# Patient Record
Sex: FEMALE | Race: WHITE
[De-identification: names, ages, dates, MRNs, and addresses within clinical notes are randomized per-mention and may not be internally consistent; named-entity substitution may affect disease eponyms.]

---

## 2017-09-05 ENCOUNTER — HOSPITAL ENCOUNTER (EMERGENCY)
Dept: HOSPITAL 10 - E/R | Age: 67
Discharge: LEFT BEFORE DISCHARGE | End: 2017-09-05
Payer: MEDICARE

## 2017-09-05 VITALS
BODY MASS INDEX: 30.08 KG/M2 | WEIGHT: 169.76 LBS | BODY MASS INDEX: 30.08 KG/M2 | HEIGHT: 63 IN | WEIGHT: 169.76 LBS | HEIGHT: 63 IN

## 2017-09-05 DIAGNOSIS — Z53.21: Primary | ICD-10-CM

## 2017-09-06 ENCOUNTER — HOSPITAL ENCOUNTER (OUTPATIENT)
Dept: HOSPITAL 10 - E/R | Age: 67
Setting detail: OBSERVATION
LOS: 2 days | Discharge: HOME | End: 2017-09-08
Attending: INTERNAL MEDICINE | Admitting: INTERNAL MEDICINE
Payer: MEDICARE

## 2017-09-06 VITALS
BODY MASS INDEX: 30.66 KG/M2 | WEIGHT: 173.06 LBS | HEIGHT: 63 IN | HEIGHT: 63 IN | WEIGHT: 173.06 LBS | BODY MASS INDEX: 30.66 KG/M2

## 2017-09-06 VITALS — DIASTOLIC BLOOD PRESSURE: 71 MMHG | SYSTOLIC BLOOD PRESSURE: 105 MMHG | HEART RATE: 73 BPM | RESPIRATION RATE: 20 BRPM

## 2017-09-06 VITALS — SYSTOLIC BLOOD PRESSURE: 110 MMHG | RESPIRATION RATE: 19 BRPM | DIASTOLIC BLOOD PRESSURE: 49 MMHG

## 2017-09-06 VITALS — HEART RATE: 80 BPM

## 2017-09-06 VITALS — HEART RATE: 85 BPM

## 2017-09-06 VITALS — TEMPERATURE: 98 F

## 2017-09-06 DIAGNOSIS — K74.60: ICD-10-CM

## 2017-09-06 DIAGNOSIS — R07.89: Primary | ICD-10-CM

## 2017-09-06 DIAGNOSIS — Z88.5: ICD-10-CM

## 2017-09-06 DIAGNOSIS — Z88.6: ICD-10-CM

## 2017-09-06 DIAGNOSIS — I10: ICD-10-CM

## 2017-09-06 DIAGNOSIS — I96: ICD-10-CM

## 2017-09-06 DIAGNOSIS — D61.818: ICD-10-CM

## 2017-09-06 DIAGNOSIS — E11.9: ICD-10-CM

## 2017-09-06 DIAGNOSIS — Z79.4: ICD-10-CM

## 2017-09-06 DIAGNOSIS — F32.9: ICD-10-CM

## 2017-09-06 LAB
ABNORMAL IP MESSAGE: 1
ALBUMIN SERPL-MCNC: 3.4 G/DL (ref 3.3–4.9)
ALBUMIN/GLOB SERPL: 1 {RATIO}
ALP SERPL-CCNC: 173 IU/L (ref 42–121)
ALT SERPL-CCNC: 37 IU/L (ref 13–69)
ANION GAP SERPL CALC-SCNC: 10 MMOL/L (ref 8–16)
APTT BLD: 28.9 SEC (ref 25–35)
AST SERPL-CCNC: 36 IU/L (ref 15–46)
BASOPHILS # BLD AUTO: 0 10^3/UL (ref 0–0.1)
BASOPHILS NFR BLD: 0.6 % (ref 0–2)
BILIRUB DIRECT SERPL-MCNC: 0 MG/DL (ref 0–0.2)
BILIRUB SERPL-MCNC: 1 MG/DL (ref 0.2–1.3)
BUN SERPL-MCNC: 14 MG/DL (ref 7–20)
CALCIUM SERPL-MCNC: 9.6 MG/DL (ref 8.4–10.2)
CHLORIDE SERPL-SCNC: 108 MMOL/L (ref 97–110)
CK MB SERPL-MCNC: 0.43 NG/ML (ref 0–2.4)
CK SERPL-CCNC: 28 IU/L (ref 23–200)
CO2 SERPL-SCNC: 27 MMOL/L (ref 21–31)
CREAT SERPL-MCNC: 0.61 MG/DL (ref 0.44–1)
EOSINOPHIL # BLD: 0 10^3/UL (ref 0–0.5)
EOSINOPHIL NFR BLD: 0.9 % (ref 0–7)
ERYTHROCYTE [DISTWIDTH] IN BLOOD BY AUTOMATED COUNT: 16.5 % (ref 11.5–14.5)
GLOBULIN SER-MCNC: 3.4 G/DL (ref 1.3–3.2)
GLUCOSE SERPL-MCNC: 282 MG/DL (ref 70–220)
HCT VFR BLD CALC: 28.7 % (ref 37–47)
HGB BLD-MCNC: 9.2 G/DL (ref 12–16)
INR PPP: 1.29
LYMPHOCYTES # BLD AUTO: 0.6 10^3/UL (ref 0.8–2.9)
LYMPHOCYTES NFR BLD AUTO: 17.5 % (ref 15–51)
MCH RBC QN AUTO: 25.4 PG (ref 29–33)
MCHC RBC AUTO-ENTMCNC: 32.1 G/DL (ref 32–37)
MCV RBC AUTO: 79.3 FL (ref 82–101)
MONOCYTES # BLD: 0.3 10^3/UL (ref 0.3–0.9)
MONOCYTES NFR BLD: 9.3 % (ref 0–11)
NEUTROPHILS NFR BLD AUTO: 71.7 % (ref 39–77)
NRBC # BLD MANUAL: 0 10^3/UL (ref 0–0)
NRBC BLD AUTO-RTO: 0 /100WBC (ref 0–0)
PLATELET # BLD: 58 10^3/UL (ref 140–415)
PMV BLD AUTO: 10.8 FL (ref 7.4–10.4)
POSITIVE DIFF: (no result)
POTASSIUM SERPL-SCNC: 4.6 MMOL/L (ref 3.5–5.1)
PROT SERPL-MCNC: 6.8 G/DL (ref 6.1–8.1)
PROTHROMBIN TIME: 16.2 SEC (ref 12.2–14.2)
PT RATIO: 1.3
RBC # BLD AUTO: 3.62 10^6/UL (ref 4.2–5.4)
SODIUM SERPL-SCNC: 140 MMOL/L (ref 135–144)
TROPONIN I SERPL-MCNC: < 0.012 NG/ML (ref 0–0.12)
TROPONIN I SERPL-MCNC: < 0.012 NG/ML (ref 0–0.12)
WBC # BLD AUTO: 3.4 10^3/UL (ref 4.8–10.8)

## 2017-09-06 PROCEDURE — 96372 THER/PROPH/DIAG INJ SC/IM: CPT

## 2017-09-06 PROCEDURE — 82550 ASSAY OF CK (CPK): CPT

## 2017-09-06 PROCEDURE — 71010: CPT

## 2017-09-06 PROCEDURE — 84484 ASSAY OF TROPONIN QUANT: CPT

## 2017-09-06 PROCEDURE — 93005 ELECTROCARDIOGRAM TRACING: CPT

## 2017-09-06 PROCEDURE — 36415 COLL VENOUS BLD VENIPUNCTURE: CPT

## 2017-09-06 PROCEDURE — 99285 EMERGENCY DEPT VISIT HI MDM: CPT

## 2017-09-06 PROCEDURE — 84439 ASSAY OF FREE THYROXINE: CPT

## 2017-09-06 PROCEDURE — 82553 CREATINE MB FRACTION: CPT

## 2017-09-06 PROCEDURE — 85025 COMPLETE CBC W/AUTO DIFF WBC: CPT

## 2017-09-06 PROCEDURE — 82728 ASSAY OF FERRITIN: CPT

## 2017-09-06 PROCEDURE — 82652 VIT D 1 25-DIHYDROXY: CPT

## 2017-09-06 PROCEDURE — 84443 ASSAY THYROID STIM HORMONE: CPT

## 2017-09-06 PROCEDURE — 85610 PROTHROMBIN TIME: CPT

## 2017-09-06 PROCEDURE — 82962 GLUCOSE BLOOD TEST: CPT

## 2017-09-06 PROCEDURE — 83735 ASSAY OF MAGNESIUM: CPT

## 2017-09-06 PROCEDURE — 85730 THROMBOPLASTIN TIME PARTIAL: CPT

## 2017-09-06 PROCEDURE — 80053 COMPREHEN METABOLIC PANEL: CPT

## 2017-09-06 PROCEDURE — 83036 HEMOGLOBIN GLYCOSYLATED A1C: CPT

## 2017-09-06 PROCEDURE — 80061 LIPID PANEL: CPT

## 2017-09-06 PROCEDURE — 83540 ASSAY OF IRON: CPT

## 2017-09-06 PROCEDURE — 82140 ASSAY OF AMMONIA: CPT

## 2017-09-06 PROCEDURE — 93306 TTE W/DOPPLER COMPLETE: CPT

## 2017-09-06 PROCEDURE — 84100 ASSAY OF PHOSPHORUS: CPT

## 2017-09-06 RX ADMIN — RIFAXIMIN SCH MG: 550 TABLET ORAL at 21:17

## 2017-09-06 RX ADMIN — CALCIUM SCH GM: 500 TABLET ORAL at 21:16

## 2017-09-06 RX ADMIN — PANTOPRAZOLE SODIUM SCH MG: 40 TABLET, DELAYED RELEASE ORAL at 19:28

## 2017-09-06 RX ADMIN — LACTULOSE SCH GM: 10 SOLUTION ORAL at 21:15

## 2017-09-06 RX ADMIN — GABAPENTIN SCH MG: 100 CAPSULE ORAL at 21:33

## 2017-09-06 NOTE — RADRPT
PROCEDURE:   Chest xray.

 

CLINICAL INDICATION:   Chest pain, difficulty breathing.

 

TECHNIQUE:   A portable semiupright AP view of the chest was obtained.

 

COMPARISON:   11/12/2016.

 

FINDINGS:

There is stable mild enlargement of the cardiac silhouette. Atherosclerotic calcifications are again
 noted in the aorta. The lungs are well moderately expanded and show normal vascularity. No focal op
acity, pleural effusion, or pneumothorax is identified. The skeletal structures and soft tissues are
 unremarkable.

 

IMPRESSION:

Stable mild cardiomegaly. Otherwise, no acute intrathoracic abnormality.

 

RPTAT:PP

_____________________________________________ 

.Bobbi Raman MD, MD           Date    Time 

Electronically viewed and signed by .Bobbi Raman MD, MD on 09/06/2017 12:50 

 

D:  09/06/2017 12:50  T:  09/06/2017 12:50

.K/

## 2017-09-06 NOTE — CONS
Date/Time of Note


Date/Time of Note


DATE: 9/6/17 


TIME: 22:24





Assessment/Plan


Assessment/Plan


Additional Assessment/Plan


Chest and abdominal pain


Dyspnea


Liver cirrhosis


Pancytopenia


History of GI bleed





-Patient with symptoms of chest discomfort and abdominal pain which is 

exacerbated by palpation and movements.  She does as well complaint of dyspnea 

which is exacerbated by activity.  Initial ECG with no significant ischemic 

abnormalities, first 2 sets of cardiac enzymes remain negative.  Will obtain 

serial cardiac enzymes, check echocardiogram.  Given history of GI bleed and 

current pancytopenia, would not continue aspirin at the current time.  Would 

consider concurrently evaluating possible GI etiology to patient's abdominal 

discomfort and chest discomfort.





Consultation Date/Type/Reason


Admit Date/Time





Type of Consultation:  cv


Reason for Consultation


Chest pain





Hx of Present Illness


This is a 67-year-old female with past medical history of liver cirrhosis, 

pancytopenia, GI bleed who presents with complaints of abdominal pain, chest 

pain and shortness of breath.  Chest pain is worse with palpation of chest wall 

and abdomen.  Shortness of breath is exacerbated by activity.  Symptoms began 

approximately 2-3 days ago.  No fevers or chills, dizziness or lightheadedness.

  At the current time, she denies chest pain or shortness of breath.


12 point review of systems was performed with all pertinent positives and 

negatives mentioned above and all else is negative


Eyes:  no complaints


ENT:  no complaints


Respiratory:  shortness of breath


Cardiovascular:  chest pain, lightheadedness


Gastrointestinal:  no complaints


Genitourinary:  no complaints


Musculoskeletal:  other (B/L LE pain)


Skin:  laceration (Right great toe)


Neurologic:  no complaints


Lymphatic:  no complaints


Psychological:  no complaints


Immunologic:  no complaints





Past Medical History


Medical History:  diabetes, high cholesterol, other (Non-alcoholic liver 

cirrhosis, esophageal varices)





Past Surgical History


Past Surgical Hx:  other (TIPS)





Family History


Significant Family History:  no pertinent family hx





Social History


Alcohol Use:  none


Smoking Status:  Never smoker


Drug Use:  none





Exam/Review of Systems


Vital Signs


Vitals





 Vital Signs








  Date Time  Temp Pulse Resp B/P Pulse Ox O2 Delivery O2 Flow Rate FiO2


 


9/6/17 20:34 97.9 79 19 110/49 98   


 


9/6/17 18:51      Room Air  











Exam


No apparent distress


Constitutional:  alert, oriented


Head:  normocephalic


Respiratory:  clear to auscultation, normal air movement


Cardiovascular:  other (S1-S2 heard), regular rate and rhythm


Gastrointestinal:  bowel sounds, soft, tender (More so epigastric region and 

left upper quadrant)


Musculoskeletal:  other (Discomfort with palpation of chest wall)


Extremities:  edema





Results


Result Diagram:  


9/6/17 1220                                                                    

            9/6/17 1220





Results 24 hrs





Laboratory Tests








Test


  9/6/17


12:20 9/6/17


18:20 9/6/17


19:24 9/6/17


21:21


 


White Blood Count 3.4  L   


 


Red Blood Count 3.62  L   


 


Hemoglobin 9.2  L   


 


Hematocrit 28.7  L   


 


Mean Corpuscular Volume 79.3  L   


 


Mean Corpuscular Hemoglobin 25.4  L   


 


Mean Corpuscular Hemoglobin


Concent 32.1  


  


  


  


 


 


Red Cell Distribution Width 16.5  H   


 


Platelet Count 58  L   


 


Mean Platelet Volume 10.8  #H   


 


Neutrophils % 71.7     


 


Lymphocytes % 17.5     


 


Monocytes % 9.3     


 


Eosinophils % 0.9     


 


Basophils % 0.6     


 


Nucleated Red Blood Cells % 0.0     


 


Neutrophils # (Manual) 2.5     


 


Lymphocytes # 0.6  L   


 


Monocytes # 0.3     


 


Eosinophils # 0.0     


 


Basophils # 0.0     


 


Nucleated Red Blood Cells # 0.0     


 


Prothrombin Time 16.2  H   


 


Prothrombin Time Ratio 1.3     


 


INR International Normalized


Ratio 1.29  


  


  


  


 


 


Activated Partial


Thromboplast Time 28.9  


  


  


  


 


 


Sodium Level 140     


 


Potassium Level 4.6     


 


Chloride Level 108     


 


Carbon Dioxide Level 27     


 


Anion Gap 10     


 


Blood Urea Nitrogen 14     


 


Creatinine 0.61     


 


Glucose Level 282  H   


 


Hemoglobin A1c 8.9  H   


 


Calcium Level 9.6     


 


Total Bilirubin 1.0     


 


Direct Bilirubin 0.00     


 


Indirect Bilirubin 1.0     


 


Aspartate Amino Transf


(AST/SGOT) 36  


  


  


  


 


 


Alanine Aminotransferase


(ALT/SGPT) 37  


  


  


  


 


 


Alkaline Phosphatase 173  H   


 


Troponin I < 0.012   < 0.012    


 


Total Protein 6.8     


 


Albumin 3.4     


 


Globulin 3.40  H   


 


Albumin/Globulin Ratio 1.00     


 


Thyroid Stimulating Hormone


(TSH) 1.270  


  


  


  


 


 


Free Thyroxine 1.24     


 


Creatine Kinase  28    


 


Creatine Kinase Index  1.5    


 


Creatinine Kinase MB (Mass)  0.43    


 


Bedside Glucose   340  H 389  H











Medications


Medications





 Current Medications


Ondansetron HCl (Zofran Inj) 4 mg Q6H  PRN IV NAUSEA AND/OR VOMITING;  Start 9/6 /17 at 16:30


Acetaminophen (Tylenol Tab) 650 mg Q6H  PRN PO PAIN LEVEL 1-3 OR FEVER;  Start 9 /6/17 at 16:30


Calcium Carbonate (Oyster Shell Calcium) 1.25 gm BID PO  Last administered on 9/ 6/17at 21:16; Admin Dose 1.25 GM;  Start 9/6/17 at 21:00


Furosemide (Lasix) 20 mg DAILY PO ;  Start 9/7/17 at 09:00


Gabapentin (Neurontin) 100 mg TID PO  Last administered on 9/6/17at 21:33; 

Admin Dose 100 MG;  Start 9/6/17 at 21:00


Isosorbide Mononitrate (Ismo) 20 mg BID PO  Last administered on 9/6/17at 21:18

; Admin Dose 20 MG;  Start 9/6/17 at 21:00


Lactulose (Enulose) 10 gm BID PO  Last administered on 9/6/17at 21:15; Admin 

Dose 10 GM;  Start 9/6/17 at 21:00


Meclizine HCl (Antivert) 25 mg DAILY PO ;  Start 9/7/17 at 09:00


Pantoprazole (Protonix Tab) 40 mg DAILY PO  Last administered on 9/6/17at 19:28

; Admin Dose 40 MG;  Start 9/6/17 at 17:00


Rifaximin (Xifaxan) 550 mg BID PO  Last administered on 9/6/17at 21:17; Admin 

Dose 550 MG;  Start 9/6/17 at 21:00


Diagnostic Test (Pha) (Accu-Chek) 1 ea 02 XX ;  Start 9/7/17 at 02:00


Insulin Glargine (Lantus) 20 unit DAILY@08 SC ;  Start 9/7/17 at 08:00


Miscellaneous Information 1 ea NOTE XX ;  Start 9/6/17 at 17:00


Glucose (Glutose) 15 gm Q15M  PRN PO DECREASED GLUCOSE;  Start 9/6/17 at 17:00


Glucose (Glutose) 22.5 gm Q15M  PRN PO DECREASED GLUCOSE;  Start 9/6/17 at 17:00


Dextrose (D50w Syringe) 25 ml Q15M  PRN IV DECREASED GLUCOSE;  Start 9/6/17 at 

17:00


Dextrose (D50w Syringe) 50 ml Q15M  PRN IV DECREASED GLUCOSE;  Start 9/6/17 at 

17:00


Glucagon (Glucagen) 1 mg Q15M  PRN IM DECREASED GLUCOSE;  Start 9/6/17 at 17:00


Glucose (Glutose) 15 gm Q15M  PRN BUCCAL DECREASED GLUCOSE;  Start 9/6/17 at 17:

00





Procedures


Procedures


ECG with sinus rhythm, normal QRS duration, no significant ischemic ST 

abnormalities











Barrington Siddiqi DO Sep 6, 2017 22:30

## 2017-09-06 NOTE — ERA
ER Documentation


Chief Complaint


Date/Time


DATE: 9/6/17 


TIME: 12:20


Chief Complaint


chest pain with sob intermittent for 2 days sob worse when laying down





HPI


This is a 67-year-old female who is using her daughter as an  who 

presents the emergency room with chest pain.  She has a history of hepatitis, 

cirrhosis and diabetes.  She describes at least 2 days of chest pain that she 

describes as left-sided, pressure-like and exertional with associated shortness 

of breath.  Chest pain alleviated with cessation of walking or exerting 

symptoms.  She denies any pleuritic pain.  She does note some increased social 

stressors recently.  She denies any melena, hematemesis.  No abdominal pain.





ROS


All systems reviewed and are negative except as per history of present illness.





Medications


Home Meds


Active Scripts


Pantoprazole* (Protonix*) 40 Mg Tablet.dr, 40 MG PO DAILY, #14 TAB


   Prov:RORY PETERSEN MD         11/12/16


Reported Medications


Calcium Carbonate ([id number]) 500 Mg Tablet, 500 MG PO BID, TAB


   9/6/17


Insulin Detemir (Levemir) 100 Unit/1 Ml Vial, 60 UNIT SC QHS, VIAL


   9/6/17


Insulin Aspart* (Novolog Insulin Pen*) 100 Unit/Ml Soln, 20 UNIT SC QAM, EA


   9/6/17


Isosorbide Mononitrate* (Isosorbide Mononitrate*) 20 Mg Tablet, 20 MG PO BID, 

TAB


   11/12/16


Ibuprofen* (Ibuprofen*) 600 Mg Tablet, 600 MG PO DAILY Y for PAIN, TAB


   11/12/16


Lactulose* (Lactulose*) 10 Gm/15 Ml Solution, 10 GM PO BID, ML


   11/12/16


Gabapentin* (Gabapentin*) 100 Mg Capsule, 100 MG PO TID, CAP


   5/10/15


Rifaximin* (Xifaxan*) 550 Mg Tablet, 550 MG PO BID


   5/8/14


Meclizine Hcl* (Meclizine Hcl*) 25 Mg Tablet, 25 MG PO DAILY


   6/27/13


Furosemide* (Lasix*) 20 Mg Tablet, 20 MG PO DAILY


   6/27/13


Discontinued Reported Medications


Cyanocobalamin* (Vitamin B-12*) 1,000 Mcg Tablet.sa, 1000 MCG PO DAILY, TAB


   11/12/16


Carbidopa/Levodopa (CARBIDOPA-LEVO  MG ODT) 1 Each Tab.rapdis, 1 TAB PO 

BID, #60 TAB


   11/12/16


Dexlansoprazole (Dexilant) 60 Mg Cap.dr.mp, 60 MG PO DAILY, #30 CAP


   11/12/16


Spironolactone* (Aldactone*) 50 Mg Tablet, 50 MG PO TID


   6/27/13





Allergies


Allergies:  


Coded Allergies:  


     morphine (Verified  Allergy, Mild, 9/6/17)


 FACIAL REDNESS


     aspirin (Verified  Allergy, Unknown, 9/6/17)





PMhx/Soc


History of Surgery:  Yes (hepatic banding/ tips)


Anesthesia Reaction:  No


Hx Neurological Disorder:  No


Hx Respiratory Disorders:  No


Hx Cardiac Disorders:  Yes (HTN)


Hx Psychiatric Problems:  Yes (depression- lost son 10 yo; lost husb 2 wks ago)


Hx Miscellaneous Medical Probl:  No


Hx Alcohol Use:  No


Hx Substance Use:  No


Hx Tobacco Use:  No





FmHx


Family History:  diabetes





Physical Exam


Vitals





Vital Signs








  Date Time  Temp Pulse Resp B/P Pulse Ox O2 Delivery O2 Flow Rate FiO2


 


9/6/17 14:51 98.0 73 18 101/49 99 Room Air  


 


9/6/17 13:19 97.7 75 16 115/49 98 Room Air  


 


9/6/17 11:42 97.6 81 18 124/57 98   








Physical Exam


General: Well developed, well nourished, no acute distress


Head: Normocephalic, atraumatic.


Eyes: Pupils equally reactive, EOM intact


ENT: Moist mucous membranes


Neck: Supple, no lymphadenopathy


Respiratory: Lungs clear bilaterally, no distress


Cardiovascular: RRR, no murmurs, rubs, or gallops


Abdominal: Soft, non-tender, non-distended, no peritoneal signs


: Deferred


MSK: No edema, no unilateral swelling, 5/5 strength, No pulse deficits


Neurologic: Alert and oriented, moving all extremities, normal speech, no focal 

weakness, no cerebellar signs


Skin: No rash


Psych: Normal mood


Result Diagram:  


9/6/17 1220                                                                    

            9/6/17 1220





Results 24 hrs





 Laboratory Tests








Test


  9/6/17


12:20


 


White Blood Count 3.410^3/ul 


 


Red Blood Count 3.6210^6/ul 


 


Hemoglobin 9.2g/dl 


 


Hematocrit 28.7% 


 


Mean Corpuscular Volume 79.3fl 


 


Mean Corpuscular Hemoglobin 25.4pg 


 


Mean Corpuscular Hemoglobin


Concent 32.1g/dl 


 


 


Red Cell Distribution Width 16.5% 


 


Platelet Count 5810^3/UL 


 


Mean Platelet Volume 10.8fl 


 


Neutrophils % 71.7% 


 


Lymphocytes % 17.5% 


 


Monocytes % 9.3% 


 


Eosinophils % 0.9% 


 


Basophils % 0.6% 


 


Nucleated Red Blood Cells % 0.0/100WBC 


 


Neutrophils # (Manual) 2.510^3/ul 


 


Lymphocytes # 0.610^3/ul 


 


Monocytes # 0.310^3/ul 


 


Eosinophils # 0.010^3/ul 


 


Basophils # 0.010^3/ul 


 


Nucleated Red Blood Cells # 0.010^3/ul 


 


Prothrombin Time 16.2Sec 


 


Prothrombin Time Ratio 1.3 


 


INR International Normalized


Ratio 1.29 


 


 


Activated Partial


Thromboplast Time 28.9Sec 


 


 


Sodium Level 140mmol/L 


 


Potassium Level 4.6mmol/L 


 


Chloride Level 108mmol/L 


 


Carbon Dioxide Level 27mmol/L 


 


Anion Gap 10 


 


Blood Urea Nitrogen 14mg/dl 


 


Creatinine 0.61mg/dl 


 


Glucose Level 282mg/dl 


 


Calcium Level 9.6mg/dl 


 


Total Bilirubin 1.0mg/dl 


 


Direct Bilirubin 0.00mg/dl 


 


Indirect Bilirubin 1.0mg/dl 


 


Aspartate Amino Transf


(AST/SGOT) 36IU/L 


 


 


Alanine Aminotransferase


(ALT/SGPT) 37IU/L 


 


 


Alkaline Phosphatase 173IU/L 


 


Troponin I < 0.012ng/ml 


 


Total Protein 6.8g/dl 


 


Albumin 3.4g/dl 


 


Globulin 3.40g/dl 


 


Albumin/Globulin Ratio 1.00 








 Current Medications








 Medications


  (Trade)  Dose


 Ordered  Sig/Candido


 Route


 PRN Reason  Start Time


 Stop Time Status Last Admin


Dose Admin


 


 Aspirin


  (Aspirin)  162 mg  ONCE  STAT


 PO


   9/6/17 12:08


 9/6/17 12:10 DC 9/6/17 12:26


 


 


 Nitroglycerin


  (Nitroglycerin


 2% Oint)  1 inch  ONCE  STAT


 TD


   9/6/17 12:08


 9/6/17 12:10 DC 9/6/17 12:26


 


 


 Vancomycin HCl 1


 ea  1 ea  PER PROTOCOL


 XX


   9/6/17 14:30


 9/6/17 14:32 DC  


 


 


 Meropenem/Sodium


 Chloride


  (Merrem 500mg/50


 ml(Pmx))  50 ml @ 


 200 mls/hr  ONCE  STAT


 IVPB


   9/6/17 14:14


 9/6/17 14:32 DC  


 


 


 Ondansetron HCl


  (Zofran Inj)  4 mg  ER BRIDGE PRN


 IV


 NAUSEA AND/OR VOMITING  9/6/17 15:00


 9/7/17 14:59   


 


 


 Acetaminophen


  (Tylenol Tab)  650 mg  ER BRIDGE PRN


 PO


 MILD PAIN/FEVER  9/6/17 15:00


 9/7/17 14:59   


 











Procedures/MDM


EKG, MONITORS, & DIAGNOSTIC IMAGING:


EKG: I reviewed and interpreted a 12-lead EKG. 


Rhythm: Normal sinus rhythm


Ectopy: None


Intervals: No abnormalities


ST segments: No elevations or depressions


T waves: No contiguous inversions





Repeat EKG:


EKG: I reviewed and interpreted a 12-lead EKG. 


Rhythm: Normal sinus rhythm


Ectopy: None


Intervals: No abnormalities


ST segments: No elevations or depressions


T waves: No contiguous inversions





Chest x-ray: I reviewed and interpreted a 1 view of the chest


Mediastinum: No enlargement


Cardiac silhouette: No cardiomegaly


Airspace: Clear lung fields bilaterally without evidence of pneumothorax


Bones: No evidence of fracture











LAB INTERPRETATION:


Negative troponin, pancytopenia consistent with the patient's known cirrhosis





MEDICAL DECISION MAKING:


The patient's history, physical exam and clinical presentation is concerning 

for possible cardiogenic etiology and acute coronary syndrome. 





Based on the patient's clinical exam and history and risk factors, I have a 

much lower clinical concern for pulmonary embolism, acute aortic dissection, 

pneumothorax, pneumonia, cardiac tamponade





HEART Score: 4


MACE Rate: 16.6%





Shared Decision Making: We had a conversation regarding risk stratification, 

MACE rate, and the risks, benefits, alternatives of disposition planning 

options.





Disposition planning: Given the patient's no prior history of cardiac risk 

stratification a believe inpatient hospitalization would be reasonable.  

Patient is agreeable.





ER COURSE:


Aspirin provided, however the patient did state aspirin allergy this is not a 

true allergy use only because the patient was advised not to take aspirin 

regularly given cirrhosis.  A single dose would be appropriate.  162 provided.  

Nitro paste applied.  Patient is chest pain-free.





I kept the patient and/or family informed of laboratory and diagnostic imaging 

results throughout the emergency room course.





DISPOSITION PLAN:


Telemetry admission to rule out acute coronary syndrome, risk stratify the 

patient and discussed provocative testing.





CONSULTATION:


Accepting care team and consultations:


I discussed the current laboratory data, diagnostic imaging and emergency care 

provided.


Admitting team: Dr. Laura


Admitting team indication: Insurance directed





Departure


Diagnosis:  


 Primary Impression:  


 Chest pain


 Qualified Code:  R07.9 - Chest pain, unspecified type


 Additional Impression:  


 Pancytopenia


Condition:  Stable











KIM TINAJERO MD Sep 6, 2017 12:23

## 2017-09-06 NOTE — HP
Date/Time of Note


Date/Time of Note


DATE: 9/6/17 


TIME: 16:57





Assessment/Plan


VTE Prophylaxis


VTE Prophylaxis Intervention:  SCD's





Lines/Catheters


IV Catheter Type (from [facility]):  Saline Lock





Assessment/Plan


Chief Complaint/Hosp Course


1.  Chest pain.  To rule out acute coronary syndrome.  Serial troponins will be 

obtained.  A 2D echocardiogram will be obtained. A cardiology consult will be 

obtained.  The patient will not be started on aspirin because of underlying 

thrombocytopenia.





2.  Type 2 diabetes mellitus.  The patient will be started on sliding scale 

insulin along with basal insulin and pre-meal insulin.  Hemoglobin A1c will be 

obtained to evaluate the blood glucose control the past few weeks.





3.  Nonalcoholic liver cirrhosis.  The patient's cirrhotic medications will be 

resumed.





4.  Pancytopenia.  Most probably secondary to underlying liver cirrhosis.  

Monitor.





5. Left great toe necrotic wound. Will have podiatry evaluate this.





Plan: The patient will be admitted to inpatient Telemetry floor. The patient 

will be started on a carbohydrate controlled diet. The patient will be started 

on DVT prophylaxis and gastrointestinal prophylaxis.  Chemical DVT prophylaxis 

will be avoided because of underlying thrombocytopenia.  The patient will 

remain a full code. Activities will be as tolerated.





The rest of the patient's management will be based on the clinical course, 

inputs from consultants, and the results of diagnostic studies.





Based on the patient's clinical presentation, she most probably requires at 

least 1 midnight's stay for further management and evaluation of her clinical 

presentation.





The case and management of this patient was fully discussed with Dr. Madden.


Problems:  





HPI/ROS


Admit Date/Time


Admit Date/Time








Hx of Present Illness


Reason for admission: Chest pain.





Consultants


1. Barrington Siddiqi DO, Cardiology.





This is a 67-year-old [place] female with past medical history of nonalcoholic 

liver cirrhosis, type 2 diabetes mellitus, and dyslipidemia, who came to the 

emergency room with chief complaint of chest pain that has been going on for 2 

days.  The patient verbalized the chest pain as substernal with no radiation.  

The patient denied any associated diaphoresis, nausea, or vomiting. The patient 

was complaining of dizziness.  She also verbalized exertional dyspnea.  She 

denied any cough.





In the emergency room, the patient's initial troponins were negative.  The 

patient was noticed to have pancytopenia.  The patient's 12-lead EKG showed 

normal sinus rhythm.  The patient was given a single dose of oral aspirin along 

with transdermal nitroglycerin with improvement in the patient's chest pain.





ROS


Constitutional:  no complaints


Eyes:  no complaints


ENT:  no complaints


Respiratory:  shortness of breath


Cardiovascular:  chest pain, lightheadedness


Gastrointestinal:  no complaints


Genitourinary:  no complaints


Musculoskeletal:  other (B/L LE pain)


Skin:  laceration (Right great toe)


Neurologic:  no complaints


Endocrine:  no complaints


Lymphatic:  no complaints


Psychological:  no complaints


Immunologic:  no complaints





PMH/Family/Social


Past Medical History


Medical History:  diabetes, high cholesterol, other (Non-alcoholic liver 

cirrhosis, esophageal varices)





Past Surgical History


Past Surgical Hx:  other (TIPS)





Social History


Alcohol Use:  none


Smoking Status:  Never smoker


Drug Use:  none





Exam/Review of Systems


Vital Signs


Vitals





 Vital Signs








  Date Time  Temp Pulse Resp B/P Pulse Ox O2 Delivery O2 Flow Rate FiO2


 


9/6/17 16:38 98.0 74 18 93/36 99 Room Air  











Exam


Exam


General: Adequately build 67 year-old female lying in bed in no apparent 

distress.


HEENT: Normocephalic, atraumatic. Eyes: Anicteric sclerae, conjunctivae clear. 

ENT: Nasal septum midline, oral mucosa moist. Neck supple, no JVD noticed.


Respiratory: Bilaterally diminished breath sounds. No use of accessory muscles 

of respiration. No adventitious breath sounds.


Cardiovascular: S1, S2 heard. Grade II/VI MARY GRACE. 


Abdomen: Soft and nontender. Distended. Bowel sounds positive in all 4 

quadrants.


Genitourinary: Deferred.


Extremities: No cyanosis, no clubbing.  Bilateral lower extremity 1+ edema. 

Peripheral pulses palpable. Right great toe necrotic wound on the medial aspect.


Neurologic: Cranial nerves II through XII grossly intact. The patient is awake, 

alert, and oriented.





Labs


Result Diagram:  


9/6/17 1220                                                                    

            9/6/17 1220








Medications


Medications





 Current Medications


Ondansetron HCl (Zofran Inj) 4 mg Q6H  PRN IV NAUSEA AND/OR VOMITING;  Start 9/6 /17 at 16:30


Acetaminophen (Tylenol Tab) 650 mg Q6H  PRN PO PAIN LEVEL 1-3 OR FEVER;  Start 9 /6/17 at 16:30


Famotidine (Pepcid) 20 mg Q12 PO ;  Start 9/6/17 at 21:00





Procedures


Procedures


CXR


IMPRESSION:


Stable mild cardiomegaly. Otherwise, no acute intrathoracic abnormality.





12-Lead EKG


Normal sinus rhythm.











JEAN HEALY NP Sep 6, 2017 17:02





JEAN HEALY NP Sep 6, 2017 17:02

## 2017-09-07 VITALS — DIASTOLIC BLOOD PRESSURE: 49 MMHG | SYSTOLIC BLOOD PRESSURE: 112 MMHG | RESPIRATION RATE: 18 BRPM

## 2017-09-07 VITALS — SYSTOLIC BLOOD PRESSURE: 109 MMHG | DIASTOLIC BLOOD PRESSURE: 57 MMHG | RESPIRATION RATE: 17 BRPM

## 2017-09-07 VITALS — HEART RATE: 87 BPM

## 2017-09-07 VITALS — DIASTOLIC BLOOD PRESSURE: 57 MMHG | SYSTOLIC BLOOD PRESSURE: 119 MMHG | RESPIRATION RATE: 18 BRPM

## 2017-09-07 VITALS — DIASTOLIC BLOOD PRESSURE: 59 MMHG | RESPIRATION RATE: 18 BRPM | SYSTOLIC BLOOD PRESSURE: 111 MMHG

## 2017-09-07 VITALS — DIASTOLIC BLOOD PRESSURE: 51 MMHG | RESPIRATION RATE: 18 BRPM | SYSTOLIC BLOOD PRESSURE: 108 MMHG

## 2017-09-07 VITALS — HEART RATE: 75 BPM

## 2017-09-07 VITALS — RESPIRATION RATE: 19 BRPM | SYSTOLIC BLOOD PRESSURE: 113 MMHG | DIASTOLIC BLOOD PRESSURE: 57 MMHG

## 2017-09-07 VITALS — SYSTOLIC BLOOD PRESSURE: 113 MMHG | RESPIRATION RATE: 19 BRPM | DIASTOLIC BLOOD PRESSURE: 63 MMHG

## 2017-09-07 VITALS — HEART RATE: 78 BPM

## 2017-09-07 VITALS — HEART RATE: 74 BPM

## 2017-09-07 VITALS — HEART RATE: 80 BPM

## 2017-09-07 LAB
ABNORMAL IP MESSAGE: 1
ALBUMIN SERPL-MCNC: 2.6 G/DL (ref 3.3–4.9)
ALBUMIN/GLOB SERPL: 0.96 {RATIO}
ALP SERPL-CCNC: 143 IU/L (ref 42–121)
ALT SERPL-CCNC: 34 IU/L (ref 13–69)
ANION GAP SERPL CALC-SCNC: 7 MMOL/L (ref 8–16)
AST SERPL-CCNC: 30 IU/L (ref 15–46)
BASOPHILS # BLD AUTO: 0 10^3/UL (ref 0–0.1)
BASOPHILS NFR BLD: 0.4 % (ref 0–2)
BILIRUB DIRECT SERPL-MCNC: 0 MG/DL (ref 0–0.2)
BILIRUB SERPL-MCNC: 0.5 MG/DL (ref 0.2–1.3)
BUN SERPL-MCNC: 14 MG/DL (ref 7–20)
CALCIUM SERPL-MCNC: 9.2 MG/DL (ref 8.4–10.2)
CHLORIDE SERPL-SCNC: 111 MMOL/L (ref 97–110)
CHOLEST SERPL-MCNC: 120 MG/DL (ref 100–200)
CHOLEST/HDLC SERPL: 2.9 RATIO
CK MB SERPL-MCNC: 0.43 NG/ML (ref 0–2.4)
CK SERPL-CCNC: < 20 IU/L (ref 23–200)
CO2 SERPL-SCNC: 27 MMOL/L (ref 21–31)
CREAT SERPL-MCNC: 0.63 MG/DL (ref 0.44–1)
EOSINOPHIL # BLD: 0.1 10^3/UL (ref 0–0.5)
EOSINOPHIL NFR BLD: 2.1 % (ref 0–7)
ERYTHROCYTE [DISTWIDTH] IN BLOOD BY AUTOMATED COUNT: 16.5 % (ref 11.5–14.5)
GLOBULIN SER-MCNC: 2.7 G/DL (ref 1.3–3.2)
GLUCOSE SERPL-MCNC: 234 MG/DL (ref 70–220)
HCT VFR BLD CALC: 25.2 % (ref 37–47)
HDLC SERPL-MCNC: 41 MG/DL (ref 35–98)
HGB BLD-MCNC: 8 G/DL (ref 12–16)
IRON SERPL-MCNC: 38 UG/DL (ref 35–150)
LYMPHOCYTES # BLD AUTO: 0.6 10^3/UL (ref 0.8–2.9)
LYMPHOCYTES NFR BLD AUTO: 23.7 % (ref 15–51)
MAGNESIUM SERPL-MCNC: 2 MG/DL (ref 1.7–2.5)
MCH RBC QN AUTO: 25.1 PG (ref 29–33)
MCHC RBC AUTO-ENTMCNC: 31.7 G/DL (ref 32–37)
MCV RBC AUTO: 79 FL (ref 82–101)
MONOCYTES # BLD: 0.3 10^3/UL (ref 0.3–0.9)
MONOCYTES NFR BLD: 11.9 % (ref 0–11)
NEUTROPHILS NFR BLD AUTO: 61.9 % (ref 39–77)
NRBC # BLD MANUAL: 0 10^3/UL (ref 0–0)
NRBC BLD AUTO-RTO: 0 /100WBC (ref 0–0)
PHOSPHATE SERPL-MCNC: 3.7 MG/DL (ref 2.5–4.9)
PLATELET # BLD: 52 10^3/UL (ref 140–415)
PMV BLD AUTO: 11.3 FL (ref 7.4–10.4)
POSITIVE DIFF: (no result)
POTASSIUM SERPL-SCNC: 4.2 MMOL/L (ref 3.5–5.1)
PROT SERPL-MCNC: 5.3 G/DL (ref 6.1–8.1)
RBC # BLD AUTO: 3.19 10^6/UL (ref 4.2–5.4)
SODIUM SERPL-SCNC: 141 MMOL/L (ref 135–144)
TIBC SERPL-MCNC: 371 UG/DL (ref 241–421)
TRIGL SERPL-MCNC: 63 MG/DL (ref 0–149)
TROPONIN I SERPL-MCNC: < 0.012 NG/ML (ref 0–0.12)
TROPONIN I SERPL-MCNC: < 0.012 NG/ML (ref 0–0.12)
WBC # BLD AUTO: 2.4 10^3/UL (ref 4.8–10.8)

## 2017-09-07 RX ADMIN — GABAPENTIN SCH MG: 100 CAPSULE ORAL at 12:35

## 2017-09-07 RX ADMIN — LACTULOSE SCH GM: 10 SOLUTION ORAL at 21:34

## 2017-09-07 RX ADMIN — RIFAXIMIN SCH MG: 550 TABLET ORAL at 08:13

## 2017-09-07 RX ADMIN — RIFAXIMIN SCH MG: 550 TABLET ORAL at 21:35

## 2017-09-07 RX ADMIN — MECLIZINE HYDROCHLORIDE SCH MG: 25 TABLET ORAL at 08:14

## 2017-09-07 RX ADMIN — INSULIN GLARGINE SCH UNIT: 100 INJECTION, SOLUTION SUBCUTANEOUS at 08:02

## 2017-09-07 RX ADMIN — GABAPENTIN SCH MG: 100 CAPSULE ORAL at 08:13

## 2017-09-07 RX ADMIN — LACTULOSE SCH GM: 10 SOLUTION ORAL at 08:12

## 2017-09-07 RX ADMIN — CALCIUM SCH GM: 500 TABLET ORAL at 21:35

## 2017-09-07 RX ADMIN — PANTOPRAZOLE SODIUM SCH MG: 40 TABLET, DELAYED RELEASE ORAL at 08:14

## 2017-09-07 RX ADMIN — CALCIUM SCH GM: 500 TABLET ORAL at 08:13

## 2017-09-07 RX ADMIN — GABAPENTIN SCH MG: 100 CAPSULE ORAL at 21:35

## 2017-09-07 NOTE — RADRPT
Echocardiogram Report

 

Patient Name:  YIMI BRADLEY   Gender:       Female

MRN:           [id number]             Accession #:  [id number]

Birth Date:    1950        Study Date:   07-Sep-2017

Sonographer:   JANETTE Alegria [facility]       Location:     [facility]

 

Ref. Physician: JEAN HEALY

Quality: Good

 

Procedures: Transthoracic echocardiogram with complete 2D, M-Mode, and 

doppler examination.

Indications: Chest Pain.

 

2D/M Mode                          Doppler

Measurement  Value  Normal Ranges  Measurement     Value  Normal Ranges

LVIDd 2D     4.5    3.5 - 5.6 cm   JR Vmax        1.7    cm2

LVIDs 2D     2.5    2.1 - 4.1 cm   JR VTI         1.7    cm2

LVPWd 2D     1.0    0.6 - 1.1 cm   AV Mean Howard     1.5    m/sec

IVSd 2D      1.0    0.6 - 1.1 cm   AV Mean PG      10.3   mmHg

AoR Diam 2D  2.6    2.0 - 3.7 cm   AV Peak Howard     2.3    m/sec

EDV 2D       91.2   cm3            AV Peak PG      20.6   mmHg

ESV 2D       16.0   cm3            AV VTI          46.6   cm

LA Dimen 2D  3.4    2.3 - 4.0 cm   LVOT Mean Howard   0.9    m/sec

LVOT Diam    1.9    cm             LVOT Mean PG    4.0    mmHg

LVOT Peak Howard   1.4    m/sec

LVOT Peak PG    8.2    mmHg

LVOT VTI        30.6   cm

MV E Peak Howard   0.9    m/sec

MV A Peak Howard   0.9    m/sec

MV E/A          1.0

MV Decel Time   172    msec

MV Decel Denali  5

MV E/A          1.0

TR Peak Howard     2.3    m/sec

TR Peak PG      21.1   mmHg

RVSP            24.0   mmHg

 

Findings

Left Ventricle: Normal left ventricular systolic function.  Normal left 

ventricular cavity size.  Normal left ventricular wall thickness.  

Ejection fraction is visually estimated at 65 %.  Tissue Doppler/Mitral 

Doppler indices are consistent with impaired relaxation (Stage I 

diastolic dysfunction).

Right Ventricle: Normal right ventricular size.  Normal right 

ventricular systolic function.

Left Atrium: The left atrium is normal in size.

Right Atrium: The right atrium is normal in size.

Mitral Valve: Mitral valve leaflets appear moderately thickened.  Mild 

mitral annular calcification.  Trace mitral regurgitation.

Aortic Valve: Aortic valve Max velocity 2.27 m/sec.  Max PG 20.60 mmHg.  

Mean PG 10.30 mmHg.  Aortic sclerosis without stenosis.  No aortic 

regurgitation.

Tricuspid Valve: Normal appearance of the tricuspid valve.  Estimated 

peak PA systolic pressure 24 mmHg.  There is trace tricuspid 

regurgitation.

Pulmonic Valve: Normal pulmonic valve appearance.

Pericardium: Normal pericardium with no significant pericardial effusion.

Aorta: Normal aortic root.

IVC: Normal size and normal respiratory collapse consistent with normal 

right atrial pressure.

 

Conclusions

Normal left ventricular systolic function.  Normal left ventricular 

cavity size.  Normal left ventricular wall thickness.  Ejection fraction 

is visually estimated at 65 %.  Tissue Doppler/Mitral Doppler indices 

are consistent with impaired relaxation (Stage I diastolic dysfunction).

Normal right ventricular size.  Normal right ventricular systolic 

function.

The left atrium is normal in size.

The right atrium is normal in size.

Aortic sclerosis without stenosis.  No aortic regurgitation.

Trace mitral regurgitation.

Estimated peak PA systolic pressure 24 mmHg.  There is trace tricuspid 

regurgitation.

Normal pericardium with no significant pericardial effusion.

 

Electronically Signed By:

Barrington Siddiqi

07-Sep-2017 12:07:39  -0700

 

Patient Name: YIMI BRADLEY

MRN: [id number]

Study Date: 07-Sep-2017

 

[id number]

## 2017-09-07 NOTE — CONS
Date/Time of Note


Date/Time of Note


DATE: 9/7/17 


TIME: 14:47





Assessment/Plan


Assessment/Plan


Additional Assessment/Plan


Assessment


* Chest pain


                cardiac vs non cardiac


* Liver cirrhosis


* Diabetes mellitus


* H/O EGD and banding of esophageal varices


Plan


* EGD but patient and family refused the procedure


* Continue present management


* Will sign off for now


* case discussed with Dr Ignacio





Consultation Date/Type/Reason


Admit Date/Time





Date of Consultation:  Sep 7, 2017


Type of Consultation:  gastroenterology


Reason for Consultation


chest pain


Referring Provider:  JEAN HEALY NP





Hx of Present Illness


67 year old female with past medical history of diabetes mellitus,liver 

cirrhosis,post EGD with banding of varices 1 year ago,presented in emergency 

room complaining of chest pain with associated abdominal discomfort.Patient 

denies any episode of hematemesis.Patient was evaluated by cardiology and 

serial troponin was negative.I spoke with the patient and family regarding the 

planned endoscopy but patient refused


Constitutional:  improved, no complaints


Eyes:  no complaints


ENT:  no complaints


Respiratory:  shortness of breath


Cardiovascular:  chest pain, lightheadedness


Gastrointestinal:  no complaints


Genitourinary:  no complaints


Musculoskeletal:  other (B/L LE pain)


Skin:  laceration (Right great toe)


Neurologic:  no complaints


Endocrine:  no complaints


Lymphatic:  no complaints


Psychological:  no complaints


Immunologic:  no complaints





Past Medical History


Medical History:  diabetes, high cholesterol, other (Non-alcoholic liver 

cirrhosis, esophageal varices)





Past Surgical History


Past Surgical Hx:  other (TIPS)





Social History


Alcohol Use:  none


Smoking Status:  Never smoker


Drug Use:  none





Exam/Review of Systems


Vital Signs


Vitals





 Vital Signs








  Date Time  Temp Pulse Resp B/P Pulse Ox O2 Delivery O2 Flow Rate FiO2


 


9/7/17 12:30  75      


 


9/7/17 12:06 98.0  19 113/57 97   


 


9/6/17 18:51      Room Air  














 Intake and Output   


 


 9/6/17 9/6/17 9/7/17





 15:00 23:00 07:00


 


Intake Total   520 ml


 


Balance   520 ml











Exam


Constitutional:  alert, oriented, well developed


Psych:  nl mood/affect, no complaints


Head:  atraumatic, normocephalic


Eyes:  EOMI, PERRL, nl conjunctiva, nl lids, nl sclera


ENMT:  nl external ears & nose, nl lips & teeth, nl nasal mucosa & septum


Neck:  non-tender, supple


Respiratory:  clear to auscultation, normal air movement


Cardiovascular:  nl pulses, regular rate and rhythm


Gastrointestinal:  nl liver, spleen, non-tender, soft


Musculoskeletal:  nl extremities to inspection, nl gait and stance


Extremities:  normal pulses


Neurological:  CNS II-XII intact, nl mental status, nl speech, nl strength


Skin:  nl turgor, 


   No rash or lesions


Lymph:  nl lymph nodes





Results


Result Diagram:  


9/7/17 0632                                                                    

            9/7/17 0631





Results 24 hrs





Laboratory Tests








Test


  9/6/17


18:16 9/6/17


18:20 9/6/17


19:24 9/6/17


21:21


 


Bedside Glucose 277  H  340  H 389  H


 


Creatine Kinase  28    


 


Creatine Kinase Index  1.5    


 


Creatinine Kinase MB (Mass)  0.43    


 


Troponin I  < 0.012    














Test


  9/7/17


00:49 9/7/17


02:19 9/7/17


06:31 9/7/17


06:32


 


Creatine Kinase < 20  L   


 


Creatine Kinase Index      


 


Creatinine Kinase MB (Mass) 0.43     


 


Troponin I < 0.012     < 0.012  


 


Bedside Glucose  278  H  


 


Sodium Level   141   


 


Potassium Level   4.2   


 


Chloride Level   111  H 


 


Carbon Dioxide Level   27   


 


Anion Gap   7  L 


 


Blood Urea Nitrogen   14   


 


Creatinine   0.63   


 


Glucose Level   234  H 


 


Calcium Level   9.2   


 


Total Bilirubin   0.5   


 


Direct Bilirubin   0.00   


 


Indirect Bilirubin   0.5   


 


Aspartate Amino Transf


(AST/SGOT) 


  


  30  


  


 


 


Alanine Aminotransferase


(ALT/SGPT) 


  


  34  


  


 


 


Alkaline Phosphatase   143  H 


 


Ammonia   120  #H 


 


Total Protein   5.3  #L 


 


Albumin   2.6  L 


 


Globulin   2.70   


 


Albumin/Globulin Ratio   0.96   


 


White Blood Count    2.4  #L


 


Red Blood Count    3.19  L


 


Hemoglobin    8.0  L


 


Hematocrit    25.2  L


 


Mean Corpuscular Volume    79.0  L


 


Mean Corpuscular Hemoglobin    25.1  L


 


Mean Corpuscular Hemoglobin


Concent 


  


  


  31.7  L


 


 


Red Cell Distribution Width    16.5  H


 


Platelet Count    52  L


 


Mean Platelet Volume    11.3  H


 


Neutrophils %    61.9  


 


Lymphocytes %    23.7  


 


Monocytes %    11.9  H


 


Eosinophils %    2.1  


 


Basophils %    0.4  


 


Nucleated Red Blood Cells %    0.0  


 


Neutrophils # (Manual)    1.5  L


 


Lymphocytes #    0.6  L


 


Monocytes #    0.3  


 


Eosinophils #    0.1  


 


Basophils #    0.0  


 


Nucleated Red Blood Cells #    0.0  


 


Phosphorus Level    3.7  


 


Magnesium Level    2.0  


 


Triglycerides Level    63  


 


Cholesterol Level    120  


 


LDL Cholesterol, Calculated    66  


 


HDL Cholesterol    41  


 


Cholesterol/HDL Ratio    2.9  














Test


  9/7/17


07:39 9/7/17


12:04 


  


 


 


Bedside Glucose 255  H 209    











Medications


Medications





 Current Medications


Ondansetron HCl (Zofran Inj) 4 mg Q6H  PRN IV NAUSEA AND/OR VOMITING;  Start 9/6 /17 at 16:30


Acetaminophen (Tylenol Tab) 650 mg Q6H  PRN PO PAIN LEVEL 1-3 OR FEVER;  Start 9 /6/17 at 16:30


Calcium Carbonate (Oyster Shell Calcium) 1.25 gm BID PO  Last administered on 9/ 7/17at 08:13; Admin Dose 1.25 GM;  Start 9/6/17 at 21:00


Furosemide (Lasix) 20 mg DAILY PO  Last administered on 9/7/17at 08:14; Admin 

Dose 20 MG;  Start 9/7/17 at 09:00


Gabapentin (Neurontin) 100 mg TID PO  Last administered on 9/7/17at 12:35; 

Admin Dose 100 MG;  Start 9/6/17 at 21:00


Isosorbide Mononitrate (Ismo) 20 mg BID PO  Last administered on 9/7/17at 08:13

; Admin Dose 20 MG;  Start 9/6/17 at 21:00


Lactulose (Enulose) 10 gm BID PO  Last administered on 9/7/17at 08:12; Admin 

Dose 10 GM;  Start 9/6/17 at 21:00


Meclizine HCl (Antivert) 25 mg DAILY PO  Last administered on 9/7/17at 08:14; 

Admin Dose 25 MG;  Start 9/7/17 at 09:00


Pantoprazole (Protonix Tab) 40 mg DAILY PO  Last administered on 9/7/17at 08:14

; Admin Dose 40 MG;  Start 9/6/17 at 17:00


Rifaximin (Xifaxan) 550 mg BID PO  Last administered on 9/7/17at 08:13; Admin 

Dose 550 MG;  Start 9/6/17 at 21:00


Diagnostic Test (Pha) (Accu-Chek) 1 ea 02 XX  Last administered on 9/7/17at 02:

25; Admin Dose 1 EA;  Start 9/7/17 at 02:00


Insulin Glargine (Lantus) 20 unit DAILY@08 SC  Last administered on 9/7/17at 08:

02; Admin Dose 20 UNIT;  Start 9/7/17 at 08:00


Miscellaneous Information 1 ea NOTE XX ;  Start 9/6/17 at 17:00


Glucose (Glutose) 15 gm Q15M  PRN PO DECREASED GLUCOSE;  Start 9/6/17 at 17:00


Glucose (Glutose) 22.5 gm Q15M  PRN PO DECREASED GLUCOSE;  Start 9/6/17 at 17:00


Dextrose (D50w Syringe) 25 ml Q15M  PRN IV DECREASED GLUCOSE;  Start 9/6/17 at 

17:00


Dextrose (D50w Syringe) 50 ml Q15M  PRN IV DECREASED GLUCOSE;  Start 9/6/17 at 

17:00


Glucagon (Glucagen) 1 mg Q15M  PRN IM DECREASED GLUCOSE;  Start 9/6/17 at 17:00


Glucose (Glutose) 15 gm Q15M  PRN BUCCAL DECREASED GLUCOSE;  Start 9/6/17 at 17:

00











JENNA MIRANDA NP Sep 7, 2017 14:54

## 2017-09-07 NOTE — CONS
Date/Time of Note


Date/Time of Note


DATE: 9/7/17 


TIME: 15:08





Assessment/Plan


Assessment/Plan


Additional Assessment/Plan


Chest and abdominal pain


Dyspnea


Preserved ejection fraction


Liver cirrhosis


Pancytopenia


History of GI bleed


Anemia





-Serial cardiac enzymes remain negative, echocardiogram with preserved ejection 

fraction.  Patient's symptoms have improved and is asking to go home.  Patient 

was seen by her esteemed colleague Dr. Padgett, who is a family friend, 

patient refused stress test in discussion with him.  No further inpatient 

cardiac workup needed at the current time





Consultation Date/Type/Reason


Admit Date/Time


Sep 6, 2017 at 15:08


Initial Consult Date


9/7/17


Type of Consultation:  cv


Referring Provider:  JEAN HEALY NP





24 HR Interval Summary


Free Text/Dictation


Feeling better, denies shortness of breath or chest pain at the current time.  

Asking to go home





Exam/Review of Systems


Vital Signs


Vitals





 Vital Signs








  Date Time  Temp Pulse Resp B/P Pulse Ox O2 Delivery O2 Flow Rate FiO2


 


9/7/17 12:30  75      


 


9/7/17 12:06 98.0  19 113/57 97   


 


9/6/17 18:51      Room Air  














 Intake and Output   


 


 9/6/17 9/6/17 9/7/17





 15:00 23:00 07:00


 


Intake Total   520 ml


 


Balance   520 ml











Exam


No apparent distress


Constitutional:  alert, oriented


Head:  normocephalic


Respiratory:  other (Coarse breath sounds bilaterally, no wheezing)


Cardiovascular:  other (S1-S2 heard), regular rate and rhythm


Gastrointestinal:  bowel sounds, soft, tender (More so in epigastric region)


Extremities:  edema





Results


Result Diagram:  


9/7/17 0632                                                                    

            9/7/17 0631





Results 24 hrs





Laboratory Tests








Test


  9/6/17


18:16 9/6/17


18:20 9/6/17


19:24 9/6/17


21:21


 


Bedside Glucose 277  H  340  H 389  H


 


Creatine Kinase  28    


 


Creatine Kinase Index  1.5    


 


Creatinine Kinase MB (Mass)  0.43    


 


Troponin I  < 0.012    














Test


  9/7/17


00:49 9/7/17


02:19 9/7/17


06:31 9/7/17


06:32


 


Creatine Kinase < 20  L   


 


Creatine Kinase Index      


 


Creatinine Kinase MB (Mass) 0.43     


 


Troponin I < 0.012     < 0.012  


 


Bedside Glucose  278  H  


 


Sodium Level   141   


 


Potassium Level   4.2   


 


Chloride Level   111  H 


 


Carbon Dioxide Level   27   


 


Anion Gap   7  L 


 


Blood Urea Nitrogen   14   


 


Creatinine   0.63   


 


Glucose Level   234  H 


 


Calcium Level   9.2   


 


Total Bilirubin   0.5   


 


Direct Bilirubin   0.00   


 


Indirect Bilirubin   0.5   


 


Aspartate Amino Transf


(AST/SGOT) 


  


  30  


  


 


 


Alanine Aminotransferase


(ALT/SGPT) 


  


  34  


  


 


 


Alkaline Phosphatase   143  H 


 


Ammonia   120  #H 


 


Total Protein   5.3  #L 


 


Albumin   2.6  L 


 


Globulin   2.70   


 


Albumin/Globulin Ratio   0.96   


 


White Blood Count    2.4  #L


 


Red Blood Count    3.19  L


 


Hemoglobin    8.0  L


 


Hematocrit    25.2  L


 


Mean Corpuscular Volume    79.0  L


 


Mean Corpuscular Hemoglobin    25.1  L


 


Mean Corpuscular Hemoglobin


Concent 


  


  


  31.7  L


 


 


Red Cell Distribution Width    16.5  H


 


Platelet Count    52  L


 


Mean Platelet Volume    11.3  H


 


Neutrophils %    61.9  


 


Lymphocytes %    23.7  


 


Monocytes %    11.9  H


 


Eosinophils %    2.1  


 


Basophils %    0.4  


 


Nucleated Red Blood Cells %    0.0  


 


Neutrophils # (Manual)    1.5  L


 


Lymphocytes #    0.6  L


 


Monocytes #    0.3  


 


Eosinophils #    0.1  


 


Basophils #    0.0  


 


Nucleated Red Blood Cells #    0.0  


 


Phosphorus Level    3.7  


 


Magnesium Level    2.0  


 


Triglycerides Level    63  


 


Cholesterol Level    120  


 


LDL Cholesterol, Calculated    66  


 


HDL Cholesterol    41  


 


Cholesterol/HDL Ratio    2.9  














Test


  9/7/17


07:39 9/7/17


12:04 


  


 


 


Bedside Glucose 255  H 209    











Medications


Medications





 Current Medications


Ondansetron HCl (Zofran Inj) 4 mg Q6H  PRN IV NAUSEA AND/OR VOMITING;  Start 9/6 /17 at 16:30


Acetaminophen (Tylenol Tab) 650 mg Q6H  PRN PO PAIN LEVEL 1-3 OR FEVER;  Start 9 /6/17 at 16:30


Calcium Carbonate (Oyster Shell Calcium) 1.25 gm BID PO  Last administered on 9/ 7/17at 08:13; Admin Dose 1.25 GM;  Start 9/6/17 at 21:00


Furosemide (Lasix) 20 mg DAILY PO  Last administered on 9/7/17at 08:14; Admin 

Dose 20 MG;  Start 9/7/17 at 09:00


Gabapentin (Neurontin) 100 mg TID PO  Last administered on 9/7/17at 12:35; 

Admin Dose 100 MG;  Start 9/6/17 at 21:00


Isosorbide Mononitrate (Ismo) 20 mg BID PO  Last administered on 9/7/17at 08:13

; Admin Dose 20 MG;  Start 9/6/17 at 21:00


Lactulose (Enulose) 10 gm BID PO  Last administered on 9/7/17at 08:12; Admin 

Dose 10 GM;  Start 9/6/17 at 21:00


Meclizine HCl (Antivert) 25 mg DAILY PO  Last administered on 9/7/17at 08:14; 

Admin Dose 25 MG;  Start 9/7/17 at 09:00


Pantoprazole (Protonix Tab) 40 mg DAILY PO  Last administered on 9/7/17at 08:14

; Admin Dose 40 MG;  Start 9/6/17 at 17:00


Rifaximin (Xifaxan) 550 mg BID PO  Last administered on 9/7/17at 08:13; Admin 

Dose 550 MG;  Start 9/6/17 at 21:00


Diagnostic Test (Pha) (Accu-Chek) 1 ea 02 XX  Last administered on 9/7/17at 02:

25; Admin Dose 1 EA;  Start 9/7/17 at 02:00


Insulin Glargine (Lantus) 20 unit DAILY@08 SC  Last administered on 9/7/17at 08:

02; Admin Dose 20 UNIT;  Start 9/7/17 at 08:00


Miscellaneous Information 1 ea NOTE XX ;  Start 9/6/17 at 17:00


Glucose (Glutose) 15 gm Q15M  PRN PO DECREASED GLUCOSE;  Start 9/6/17 at 17:00


Glucose (Glutose) 22.5 gm Q15M  PRN PO DECREASED GLUCOSE;  Start 9/6/17 at 17:00


Dextrose (D50w Syringe) 25 ml Q15M  PRN IV DECREASED GLUCOSE;  Start 9/6/17 at 

17:00


Dextrose (D50w Syringe) 50 ml Q15M  PRN IV DECREASED GLUCOSE;  Start 9/6/17 at 

17:00


Glucagon (Glucagen) 1 mg Q15M  PRN IM DECREASED GLUCOSE;  Start 9/6/17 at 17:00


Glucose (Glutose) 15 gm Q15M  PRN BUCCAL DECREASED GLUCOSE;  Start 9/6/17 at 17:

00











Barrington Siddiqi DO Sep 7, 2017 15:11

## 2017-09-07 NOTE — PN
Date/Time of Note


Date/Time of Note


DATE: 9/7/17 


TIME: 13:46





Assessment/Plan


VTE Prophylaxis


VTE Prophylaxis Intervention:  contraindicated





Lines/Catheters


IV Catheter Type (from [facility]):  Saline Lock





Assessment/Plan


Chief Complaint/Hosp Course


1.  Chest pain. Serial troponins have been negative.  2D echocardiogram showing 

preserved left ventricular ejection fraction. Cardiology following.  No aspirin 

because of underlying thrombocytopenia and anemia.  Will have gastroenterology 

evaluate the patient since the patient has prior history of esophageal varices 

status post banding.





2.  Type 2 diabetes mellitus.  The patient will be continued on sliding scale 

insulin along with basal insulin and pre-meal insulin.  Hemoglobin A1c 8.9. 





3.  Nonalcoholic liver cirrhosis.  Continue rifaximin and lactulose.





4.  Pancytopenia.  Most probably secondary to underlying liver cirrhosis.  

Monitor.





5. Left great toe necrotic wound.  Podiatry has been consulted.





6.  Fluids, electrolytes, and nutrition.  Carbohydrate controlled diet.





7.  DVT prophylaxis.  Contraindicated.





8.  Plan.  Continue inpatient monitoring.  Obtain gastroenterology consult.





The case and management of this patient was fully discussed with Dr. Madden.


Problems:  





Subjective


24 Hr Interval Summary


Free Text/Dictation


Complains of epigastric pain.





Exam/Review of Systems


Vital Signs


Vitals





 Vital Signs








  Date Time  Temp Pulse Resp B/P Pulse Ox O2 Delivery O2 Flow Rate FiO2


 


9/7/17 12:30  75      


 


9/7/17 12:06 98.0  19 113/57 97   


 


9/6/17 18:51      Room Air  














 Intake and Output   


 


 9/6/17 9/6/17 9/7/17





 15:00 23:00 07:00


 


Intake Total   520 ml


 


Balance   520 ml











Exam


General: Adequately build 67 year-old female lying in bed in no apparent 

distress.


HEENT: Normocephalic, atraumatic. Eyes: Anicteric sclerae, conjunctivae clear. 

ENT: Nasal septum midline, oral mucosa moist. Neck supple, no JVD noticed.


Respiratory: Bilaterally diminished breath sounds. No use of accessory muscles 

of respiration. No adventitious breath sounds.


Cardiovascular: S1, S2 heard. Grade II/VI MARY GRACE. 


Abdomen: Soft and nontender. Distended. Bowel sounds positive in all 4 

quadrants.


Genitourinary: Deferred.


Extremities: No cyanosis, no clubbing.  Bilateral lower extremity 1+ edema. 

Peripheral pulses palpable. Right great toe necrotic wound on the medial aspect.


Neurologic: Cranial nerves II through XII grossly intact. The patient is awake, 

alert, and oriented.





Results


Result Diagram:  


9/7/17 0632                                                                    

            9/7/17 0631





Results 24 hrs





Laboratory Tests








Test


  9/6/17


18:16 9/6/17


18:20 9/6/17


19:24 9/6/17


21:21


 


Bedside Glucose 277  H  340  H 389  H


 


Creatine Kinase  28    


 


Creatine Kinase Index  1.5    


 


Creatinine Kinase MB (Mass)  0.43    


 


Troponin I  < 0.012    














Test


  9/7/17


00:49 9/7/17


02:19 9/7/17


06:31 9/7/17


06:32


 


Creatine Kinase < 20  L   


 


Creatine Kinase Index      


 


Creatinine Kinase MB (Mass) 0.43     


 


Troponin I < 0.012     < 0.012  


 


Bedside Glucose  278  H  


 


Sodium Level   141   


 


Potassium Level   4.2   


 


Chloride Level   111  H 


 


Carbon Dioxide Level   27   


 


Anion Gap   7  L 


 


Blood Urea Nitrogen   14   


 


Creatinine   0.63   


 


Glucose Level   234  H 


 


Calcium Level   9.2   


 


Total Bilirubin   0.5   


 


Direct Bilirubin   0.00   


 


Indirect Bilirubin   0.5   


 


Aspartate Amino Transf


(AST/SGOT) 


  


  30  


  


 


 


Alanine Aminotransferase


(ALT/SGPT) 


  


  34  


  


 


 


Alkaline Phosphatase   143  H 


 


Ammonia   120  #H 


 


Total Protein   5.3  #L 


 


Albumin   2.6  L 


 


Globulin   2.70   


 


Albumin/Globulin Ratio   0.96   


 


White Blood Count    2.4  #L


 


Red Blood Count    3.19  L


 


Hemoglobin    8.0  L


 


Hematocrit    25.2  L


 


Mean Corpuscular Volume    79.0  L


 


Mean Corpuscular Hemoglobin    25.1  L


 


Mean Corpuscular Hemoglobin


Concent 


  


  


  31.7  L


 


 


Red Cell Distribution Width    16.5  H


 


Platelet Count    52  L


 


Mean Platelet Volume    11.3  H


 


Neutrophils %    61.9  


 


Lymphocytes %    23.7  


 


Monocytes %    11.9  H


 


Eosinophils %    2.1  


 


Basophils %    0.4  


 


Nucleated Red Blood Cells %    0.0  


 


Neutrophils # (Manual)    1.5  L


 


Lymphocytes #    0.6  L


 


Monocytes #    0.3  


 


Eosinophils #    0.1  


 


Basophils #    0.0  


 


Nucleated Red Blood Cells #    0.0  


 


Phosphorus Level    3.7  


 


Magnesium Level    2.0  


 


Triglycerides Level    63  


 


Cholesterol Level    120  


 


LDL Cholesterol, Calculated    66  


 


HDL Cholesterol    41  


 


Cholesterol/HDL Ratio    2.9  














Test


  9/7/17


07:39 9/7/17


12:04 


  


 


 


Bedside Glucose 255  H 209    











Medications


Medications





 Current Medications


Ondansetron HCl (Zofran Inj) 4 mg Q6H  PRN IV NAUSEA AND/OR VOMITING;  Start 9/6 /17 at 16:30


Acetaminophen (Tylenol Tab) 650 mg Q6H  PRN PO PAIN LEVEL 1-3 OR FEVER;  Start 9 /6/17 at 16:30


Calcium Carbonate (Oyster Shell Calcium) 1.25 gm BID PO  Last administered on 9/ 7/17at 08:13; Admin Dose 1.25 GM;  Start 9/6/17 at 21:00


Furosemide (Lasix) 20 mg DAILY PO  Last administered on 9/7/17at 08:14; Admin 

Dose 20 MG;  Start 9/7/17 at 09:00


Gabapentin (Neurontin) 100 mg TID PO  Last administered on 9/7/17at 12:35; 

Admin Dose 100 MG;  Start 9/6/17 at 21:00


Isosorbide Mononitrate (Ismo) 20 mg BID PO  Last administered on 9/7/17at 08:13

; Admin Dose 20 MG;  Start 9/6/17 at 21:00


Lactulose (Enulose) 10 gm BID PO  Last administered on 9/7/17at 08:12; Admin 

Dose 10 GM;  Start 9/6/17 at 21:00


Meclizine HCl (Antivert) 25 mg DAILY PO  Last administered on 9/7/17at 08:14; 

Admin Dose 25 MG;  Start 9/7/17 at 09:00


Pantoprazole (Protonix Tab) 40 mg DAILY PO  Last administered on 9/7/17at 08:14

; Admin Dose 40 MG;  Start 9/6/17 at 17:00


Rifaximin (Xifaxan) 550 mg BID PO  Last administered on 9/7/17at 08:13; Admin 

Dose 550 MG;  Start 9/6/17 at 21:00


Diagnostic Test (Pha) (Accu-Chek) 1 ea 02 XX  Last administered on 9/7/17at 02:

25; Admin Dose 1 EA;  Start 9/7/17 at 02:00


Insulin Glargine (Lantus) 20 unit DAILY@08 SC  Last administered on 9/7/17at 08:

02; Admin Dose 20 UNIT;  Start 9/7/17 at 08:00


Miscellaneous Information 1 ea NOTE XX ;  Start 9/6/17 at 17:00


Glucose (Glutose) 15 gm Q15M  PRN PO DECREASED GLUCOSE;  Start 9/6/17 at 17:00


Glucose (Glutose) 22.5 gm Q15M  PRN PO DECREASED GLUCOSE;  Start 9/6/17 at 17:00


Dextrose (D50w Syringe) 25 ml Q15M  PRN IV DECREASED GLUCOSE;  Start 9/6/17 at 

17:00


Dextrose (D50w Syringe) 50 ml Q15M  PRN IV DECREASED GLUCOSE;  Start 9/6/17 at 

17:00


Glucagon (Glucagen) 1 mg Q15M  PRN IM DECREASED GLUCOSE;  Start 9/6/17 at 17:00


Glucose (Glutose) 15 gm Q15M  PRN BUCCAL DECREASED GLUCOSE;  Start 9/6/17 at 17:

00











JEAN HEALY NP Sep 7, 2017 13:52

## 2017-09-08 VITALS — HEART RATE: 78 BPM

## 2017-09-08 VITALS — SYSTOLIC BLOOD PRESSURE: 107 MMHG | DIASTOLIC BLOOD PRESSURE: 49 MMHG | RESPIRATION RATE: 17 BRPM

## 2017-09-08 VITALS — SYSTOLIC BLOOD PRESSURE: 116 MMHG | RESPIRATION RATE: 16 BRPM | DIASTOLIC BLOOD PRESSURE: 45 MMHG

## 2017-09-08 VITALS — HEART RATE: 80 BPM

## 2017-09-08 VITALS — RESPIRATION RATE: 18 BRPM | DIASTOLIC BLOOD PRESSURE: 56 MMHG | SYSTOLIC BLOOD PRESSURE: 115 MMHG

## 2017-09-08 VITALS — HEART RATE: 79 BPM

## 2017-09-08 LAB
ABNORMAL IP MESSAGE: 1
ALBUMIN SERPL-MCNC: 2.7 G/DL (ref 3.3–4.9)
ALBUMIN/GLOB SERPL: 1.03 {RATIO}
ALP SERPL-CCNC: 142 IU/L (ref 42–121)
ALT SERPL-CCNC: 32 IU/L (ref 13–69)
ANION GAP SERPL CALC-SCNC: 7 MMOL/L (ref 8–16)
AST SERPL-CCNC: 42 IU/L (ref 15–46)
BASOPHILS # BLD AUTO: 0 10^3/UL (ref 0–0.1)
BASOPHILS NFR BLD: 0.7 % (ref 0–2)
BILIRUB DIRECT SERPL-MCNC: 0 MG/DL (ref 0–0.2)
BILIRUB SERPL-MCNC: 0.7 MG/DL (ref 0.2–1.3)
BUN SERPL-MCNC: 14 MG/DL (ref 7–20)
CALCIUM SERPL-MCNC: 10.4 MG/DL (ref 8.4–10.2)
CHLORIDE SERPL-SCNC: 108 MMOL/L (ref 97–110)
CO2 SERPL-SCNC: 27 MMOL/L (ref 21–31)
CREAT SERPL-MCNC: 0.65 MG/DL (ref 0.44–1)
EOSINOPHIL # BLD: 0.1 10^3/UL (ref 0–0.5)
EOSINOPHIL NFR BLD: 2.1 % (ref 0–7)
ERYTHROCYTE [DISTWIDTH] IN BLOOD BY AUTOMATED COUNT: 16.5 % (ref 11.5–14.5)
GLOBULIN SER-MCNC: 2.6 G/DL (ref 1.3–3.2)
GLUCOSE SERPL-MCNC: 169 MG/DL (ref 70–220)
HCT VFR BLD CALC: 25.6 % (ref 37–47)
HGB BLD-MCNC: 8.2 G/DL (ref 12–16)
LYMPHOCYTES # BLD AUTO: 0.7 10^3/UL (ref 0.8–2.9)
LYMPHOCYTES NFR BLD AUTO: 24.6 % (ref 15–51)
MAGNESIUM SERPL-MCNC: 1.9 MG/DL (ref 1.7–2.5)
MCH RBC QN AUTO: 25.2 PG (ref 29–33)
MCHC RBC AUTO-ENTMCNC: 32 G/DL (ref 32–37)
MCV RBC AUTO: 78.8 FL (ref 82–101)
MONOCYTES # BLD: 0.3 10^3/UL (ref 0.3–0.9)
MONOCYTES NFR BLD: 10.7 % (ref 0–11)
NEUTROPHILS NFR BLD AUTO: 61.5 % (ref 39–77)
NRBC # BLD MANUAL: 0 10^3/UL (ref 0–0)
NRBC BLD AUTO-RTO: 0 /100WBC (ref 0–0)
PHOSPHATE SERPL-MCNC: 4.6 MG/DL (ref 2.5–4.9)
PLATELET # BLD: 61 10^3/UL (ref 140–415)
PMV BLD AUTO: 11.2 FL (ref 7.4–10.4)
POSITIVE DIFF: (no result)
POTASSIUM SERPL-SCNC: 4.3 MMOL/L (ref 3.5–5.1)
PROT SERPL-MCNC: 5.3 G/DL (ref 6.1–8.1)
RBC # BLD AUTO: 3.25 10^6/UL (ref 4.2–5.4)
SODIUM SERPL-SCNC: 138 MMOL/L (ref 135–144)
WBC # BLD AUTO: 2.8 10^3/UL (ref 4.8–10.8)

## 2017-09-08 RX ADMIN — MECLIZINE HYDROCHLORIDE SCH MG: 25 TABLET ORAL at 08:32

## 2017-09-08 RX ADMIN — PANTOPRAZOLE SODIUM SCH MG: 40 TABLET, DELAYED RELEASE ORAL at 08:32

## 2017-09-08 RX ADMIN — GABAPENTIN SCH MG: 100 CAPSULE ORAL at 08:32

## 2017-09-08 RX ADMIN — CALCIUM SCH GM: 500 TABLET ORAL at 08:33

## 2017-09-08 RX ADMIN — GABAPENTIN SCH MG: 100 CAPSULE ORAL at 12:33

## 2017-09-08 RX ADMIN — RIFAXIMIN SCH MG: 550 TABLET ORAL at 08:33

## 2017-09-08 RX ADMIN — LACTULOSE SCH GM: 10 SOLUTION ORAL at 08:32

## 2017-09-08 RX ADMIN — INSULIN GLARGINE SCH UNIT: 100 INJECTION, SOLUTION SUBCUTANEOUS at 08:23

## 2017-09-08 NOTE — CONS
Date/Time of Note


Date/Time of Note


DATE: 9/8/17 


TIME: 12:23





Assessment/Plan


Assessment/Plan


Additional Assessment/Plan


Chest and abdominal pain


Dyspnea


Preserved ejection fraction


Liver cirrhosis


Pancytopenia


History of GI bleed


Anemia





-Serial cardiac enzymes remain negative, echocardiogram with preserved ejection 

fraction.  Patient's symptoms have improved and is asking to go home.   No 

further inpatient cardiac workup needed at the current time





Consultation Date/Type/Reason


Admit Date/Time


Sep 6, 2017 at 15:08


Initial Consult Date


9/7/17


Type of Consultation:  cv


Referring Provider:  JEAN HEALY NP





24 HR Interval Summary


Free Text/Dictation


Denies shortness of breath, chest pain





Exam/Review of Systems


Vital Signs


Vitals





 Vital Signs








  Date Time  Temp Pulse Resp B/P Pulse Ox O2 Delivery O2 Flow Rate FiO2


 


9/8/17 12:12  79      


 


9/8/17 11:07 98.2  17 107/49 96   


 


9/6/17 18:51      Room Air  














 Intake and Output   


 


 9/7/17 9/7/17 9/8/17





 15:00 23:00 07:00


 


Intake Total  900 ml 500 ml


 


Balance  900 ml 500 ml











Exam


No apparent distress


Constitutional:  alert, oriented


Head:  normocephalic


Respiratory:  other (coarse breath sounds bilaterally, no wheezing)


Cardiovascular:  other (s1 S2 heard), regular rate and rhythm


Gastrointestinal:  bowel sounds, non-tender, soft


Extremities:  edema





Results


Result Diagram:  


9/8/17 0637                                                                    

            9/8/17 0637





Results 24 hrs





Laboratory Tests








Test


  9/7/17


14:20 9/7/17


17:41 9/7/17


21:37 9/8/17


02:04


 


Iron Level 38     


 


Total Iron Binding Capacity 371     


 


Percent Iron Saturation 10  L   


 


Ferritin 8.9  L   


 


Bedside Glucose  320  H 181   148  














Test


  9/8/17


06:37 9/8/17


08:09 9/8/17


12:07 


 


 


White Blood Count 2.8  L   


 


Red Blood Count 3.25  L   


 


Hemoglobin 8.2  L   


 


Hematocrit 25.6  L   


 


Mean Corpuscular Volume 78.8  L   


 


Mean Corpuscular Hemoglobin 25.2  L   


 


Mean Corpuscular Hemoglobin


Concent 32.0  


  


  


  


 


 


Red Cell Distribution Width 16.5  H   


 


Platelet Count 61  L   


 


Mean Platelet Volume 11.2  H   


 


Neutrophils % 61.5     


 


Lymphocytes % 24.6     


 


Monocytes % 10.7     


 


Eosinophils % 2.1     


 


Basophils % 0.7     


 


Nucleated Red Blood Cells % 0.0     


 


Neutrophils # (Manual) 1.7     


 


Lymphocytes # 0.7  L   


 


Monocytes # 0.3     


 


Eosinophils # 0.1     


 


Basophils # 0.0     


 


Nucleated Red Blood Cells # 0.0     


 


Sodium Level 138     


 


Potassium Level 4.3     


 


Chloride Level 108     


 


Carbon Dioxide Level 27     


 


Anion Gap 7  L   


 


Blood Urea Nitrogen 14     


 


Creatinine 0.65     


 


Glucose Level 169     


 


Calcium Level 10.4  H   


 


Phosphorus Level 4.6     


 


Magnesium Level 1.9     


 


Total Bilirubin 0.7     


 


Direct Bilirubin 0.00     


 


Indirect Bilirubin 0.7     


 


Aspartate Amino Transf


(AST/SGOT) 42  


  


  


  


 


 


Alanine Aminotransferase


(ALT/SGPT) 32  


  


  


  


 


 


Alkaline Phosphatase 142  H   


 


Ammonia 101  H   


 


Total Protein 5.3  L   


 


Albumin 2.7  L   


 


Globulin 2.60     


 


Albumin/Globulin Ratio 1.03     


 


Bedside Glucose  190   204   











Medications


Medications





 Current Medications


Ondansetron HCl (Zofran Inj) 4 mg Q6H  PRN IV NAUSEA AND/OR VOMITING;  Start 9/6 /17 at 16:30


Acetaminophen (Tylenol Tab) 650 mg Q6H  PRN PO PAIN LEVEL 1-3 OR FEVER;  Start 9 /6/17 at 16:30


Calcium Carbonate (Oyster Shell Calcium) 1.25 gm BID PO  Last administered on 9/ 8/17at 08:33; Admin Dose 1.25 GM;  Start 9/6/17 at 21:00


Furosemide (Lasix) 20 mg DAILY PO  Last administered on 9/8/17at 08:33; Admin 

Dose 20 MG;  Start 9/7/17 at 09:00


Gabapentin (Neurontin) 100 mg TID PO  Last administered on 9/8/17at 08:32; 

Admin Dose 100 MG;  Start 9/6/17 at 21:00


Isosorbide Mononitrate (Ismo) 20 mg BID PO  Last administered on 9/8/17at 08:33

; Admin Dose 20 MG;  Start 9/6/17 at 21:00


Lactulose (Enulose) 10 gm BID PO  Last administered on 9/8/17at 08:32; Admin 

Dose 10 GM;  Start 9/6/17 at 21:00


Meclizine HCl (Antivert) 25 mg DAILY PO  Last administered on 9/8/17at 08:32; 

Admin Dose 25 MG;  Start 9/7/17 at 09:00


Pantoprazole (Protonix Tab) 40 mg DAILY PO  Last administered on 9/8/17at 08:32

; Admin Dose 40 MG;  Start 9/6/17 at 17:00


Rifaximin (Xifaxan) 550 mg BID PO  Last administered on 9/8/17at 08:33; Admin 

Dose 550 MG;  Start 9/6/17 at 21:00


Diagnostic Test (Pha) (Accu-Chek) 1 ea 02 XX  Last administered on 9/8/17at 02:

23; Admin Dose 1 EA;  Start 9/7/17 at 02:00


Insulin Glargine (Lantus) 20 unit DAILY@08 SC  Last administered on 9/8/17at 08:

23; Admin Dose 20 UNIT;  Start 9/7/17 at 08:00


Miscellaneous Information 1 ea NOTE XX ;  Start 9/6/17 at 17:00


Glucose (Glutose) 15 gm Q15M  PRN PO DECREASED GLUCOSE;  Start 9/6/17 at 17:00


Glucose (Glutose) 22.5 gm Q15M  PRN PO DECREASED GLUCOSE;  Start 9/6/17 at 17:00


Dextrose (D50w Syringe) 25 ml Q15M  PRN IV DECREASED GLUCOSE;  Start 9/6/17 at 

17:00


Dextrose (D50w Syringe) 50 ml Q15M  PRN IV DECREASED GLUCOSE;  Start 9/6/17 at 

17:00


Glucagon (Glucagen) 1 mg Q15M  PRN IM DECREASED GLUCOSE;  Start 9/6/17 at 17:00


Glucose (Glutose) 15 gm Q15M  PRN BUCCAL DECREASED GLUCOSE;  Start 9/6/17 at 17:

00











Barrington Siddiqi DO Sep 8, 2017 12:24

## 2017-09-08 NOTE — DS
Date/Time of Note


Date/Time of Note


DATE: 9/8/17 


TIME: 12:25





Discharge Summary


Admission/Discharge Info


Admit Date/Time


Sep 6, 2017 at 15:08


Discharge Date/Time





Discharge Diagnosis


1. Atypical chest pain.





2.  Type 2 diabetes mellitus. 





3.  Nonalcoholic liver cirrhosis. 





4.  Pancytopenia.  





5. Left great toe necrotic wound.


Patient Condition:  Stable


Consults


1. Barrington Siddiqi DO, Cardiology.





2. Lakisha Ignacio MD, Gastroenterology.


Procedures


2D Echocardiogram


Normal left ventricular systolic function.  Normal left ventricular 


cavity size.  Normal left ventricular wall thickness.  Ejection fraction 


is visually estimated at 65 %.  Tissue Doppler/Mitral Doppler indices 


are consistent with impaired relaxation (Stage I diastolic dysfunction).


Normal right ventricular size.  Normal right ventricular systolic 


function.


The left atrium is normal in size.


The right atrium is normal in size.


Aortic sclerosis without stenosis.  No aortic regurgitation.


Trace mitral regurgitation.


Estimated peak PA systolic pressure 24 mmHg.  There is trace tricuspid 


regurgitation.


Normal pericardium with no significant pericardial effusion.


Hx of Present Illness


Reason for admission: Chest pain.





Consultants


1. Barrington Siddiqi DO, Cardiology.





This is a 67-year-old [place] female with past medical history of nonalcoholic 

liver cirrhosis, type 2 diabetes mellitus, and dyslipidemia, who came to the 

emergency room with chief complaint of chest pain that has been going on for 2 

days.  The patient verbalized the chest pain as substernal with no radiation.  

The patient denied any associated diaphoresis, nausea, or vomiting. The patient 

was complaining of dizziness.  She also verbalized exertional dyspnea.  She 

denied any cough.





In the emergency room, the patient's initial troponins were negative.  The 

patient was noticed to have pancytopenia.  The patient's 12-lead EKG showed 

normal sinus rhythm.  The patient was given a single dose of oral aspirin along 

with transdermal nitroglycerin with improvement in the patient's chest pain.


Hospital Course


The patient was admitted to inpatient telemetry floor.  A cardiology consult 

was obtained.  The patient serial troponins remained negative.  The patient's 

2D echocardiogram showed preserved left ventricular ejection fraction.  The 

patient was ruled out for any underlying acute coronary syndrome.  The patient 

refused further testing including any cardiac stress test.  On the other hand, 

the patient has a prior history of esophageal varices status post banding.  

Cardiology was recommending gastroenterology evaluation to identify any other 

causes of this atypical chest pain.  Consequently, gastroenterology was 

consulted.  Gastroenterology recommended esophagogastroduodenoscopy.  However, 

the patient and the patient's family refused the procedure.  Hence 

gastroenterology signed off from the patient's case.





The patient has underlying pancytopenia secondary to nonalcoholic liver 

cirrhosis.  The patient was maintained on her medications for underlying 

cirrhosis.  The patient was also noticed to have a left great toe necrotic 

ulcer.  Consequently, podiatry was informed on the patient's admission to the 

hospital.  Nevertheless, the patient was not seen by podiatry.  The patient can 

follow up with outpatient podiatry regarding this concern.  The patient has no 

evidence of any infection of this wound.  Patient has underlying type 2 

diabetes mellitus.  She was maintained on sliding scale insulin.  The patient 

was noticed to have a hemoglobin A1c of 8.9. The patient was noticed to have 

significant iron deficiency.  Consequently, the patient will be discharged home 

on iron supplements.





The patient was cleared to be discharged home by cardiology since there is no 

further cardiac workup.  The patient and the patient's family refused any 

gastroenterology workup.  Therefore, the patient will be discharged home to be 

followed up with outpatient primary care physician.





Discharge Instructions 


1.  Resume home medications.


2.  Follow a carbohydrate controlled diet.


3.  Resume activities as tolerated.


4.  Follow-up with your primary care physician 1 week.


5.  Please call 911 or go to the nearest emergency room if you have any 

significant chest pain.





Patient verbalized understanding of her discharge instructions.





At this time I would like to thank all the consultants for seeing the patient 

and providing clinical recommendations.





The case and management of this patient was fully discussed with Dr. Madden.


Home Meds


Active Scripts


Ferrous Sulfate* (Ferrous Sulfate*) 325 Mg Tabec, 325 MG PO BID, #60 TAB


   Prov:JEAN HEALY NP         9/8/17


Pantoprazole* (Protonix*) 40 Mg Tablet., 40 MG PO DAILY, #14 TAB


   Prov:RORY PETERSEN MD         11/12/16


Reported Medications


Calcium Carbonate (Dkgt-[id number]-692) 500 Mg Tablet, 500 MG PO BID, TAB


   9/6/17


Insulin Detemir (Levemir) 100 Unit/1 Ml Vial, 60 UNIT SC QHS, VIAL


   9/6/17


Insulin Aspart* (Novolog Insulin Pen*) 100 Unit/Ml Soln, 20 UNIT SC QAM, EA


   9/6/17


Isosorbide Mononitrate* (Isosorbide Mononitrate*) 20 Mg Tablet, 20 MG PO BID, 

TAB


   11/12/16


Ibuprofen* (Ibuprofen*) 600 Mg Tablet, 600 MG PO DAILY Y for PAIN, TAB


   11/12/16


Lactulose* (Lactulose*) 10 Gm/15 Ml Solution, 10 GM PO BID, ML


   11/12/16


Gabapentin* (Gabapentin*) 100 Mg Capsule, 100 MG PO TID, CAP


   5/10/15


Rifaximin* (Xifaxan*) 550 Mg Tablet, 550 MG PO BID


   5/8/14


Meclizine Hcl* (Meclizine Hcl*) 25 Mg Tablet, 25 MG PO DAILY


   6/27/13


Furosemide* (Lasix*) 20 Mg Tablet, 20 MG PO DAILY


   6/27/13


Discontinued Reported Medications


Cyanocobalamin* (Vitamin B-12*) 1,000 Mcg Tablet.sa, 1000 MCG PO DAILY, TAB


   11/12/16


Carbidopa/Levodopa (CARBIDOPA-LEVO  MG ODT) 1 Each Tab.rapdis, 1 TAB PO 

BID, #60 TAB


   11/12/16


Dexlansoprazole (Dexilant) 60 Mg Cap., 60 MG PO DAILY, #30 CAP


   11/12/16


Spironolactone* (Aldactone*) 50 Mg Tablet, 50 MG PO TID


   6/27/13


Follow-up Plan


Follow-up with your primary care physician in 1 week.


Primary Care Provider


Not On Staff Doctor


Time spent on discharge:   > 30 minutes


Pending Labs





Laboratory Tests








Test


  9/7/17


14:20 9/7/17


17:41 9/7/17


21:37 9/8/17


02:04


 


Iron Level


  38ug/dl


() 


  


  


 


 


Total Iron Binding Capacity


  371ug/dl


(241-421) 


  


  


 


 


Percent Iron Saturation


  10% SAT


(22-52) 


  


  


 


 


Ferritin


  8.9ng/ml


(11.1-264.0) 


  


  


 


 


Bedside Glucose


  


  320mg/dL


() 181mg/dL


() 148mg/dL


()














Test


  9/8/17


06:37 9/8/17


08:09 9/8/17


12:07 


 


 


White Blood Count


  2.810^3/ul


(4.8-10.8) 


  


  


 


 


Red Blood Count


  3.2510^6/ul


(4.20-5.40) 


  


  


 


 


Hemoglobin


  8.2g/dl


(12.0-16.0) 


  


  


 


 


Hematocrit


  25.6%


(37.0-47.0) 


  


  


 


 


Mean Corpuscular Volume


  78.8fl


(82.0-101.0) 


  


  


 


 


Mean Corpuscular Hemoglobin


  25.2pg


(29.0-33.0) 


  


  


 


 


Mean Corpuscular Hemoglobin


Concent 32.0g/dl


(32.0-37.0) 


  


  


 


 


Red Cell Distribution Width


  16.5%


(11.5-14.5) 


  


  


 


 


Platelet Count


  6110^3/UL


(140-415) 


  


  


 


 


Mean Platelet Volume


  11.2fl


(7.4-10.4) 


  


  


 


 


Neutrophils %


  61.5%


(39.0-77.0) 


  


  


 


 


Lymphocytes %


  24.6%


(15.0-51.0) 


  


  


 


 


Monocytes %


  10.7%


(0.0-11.0) 


  


  


 


 


Eosinophils % 2.1% (0.0-7.0)    


 


Basophils % 0.7% (0.0-2.0)    


 


Nucleated Red Blood Cells %


  0.0/100WBC


(0.0-0.0) 


  


  


 


 


Neutrophils # (Manual)


  1.710^3/ul


(1.7-7.5) 


  


  


 


 


Lymphocytes #


  0.710^3/ul


(0.8-2.9) 


  


  


 


 


Monocytes #


  0.310^3/ul


(0.3-0.9) 


  


  


 


 


Eosinophils #


  0.110^3/ul


(0.0-0.5) 


  


  


 


 


Basophils #


  0.010^3/ul


(0.0-0.1) 


  


  


 


 


Nucleated Red Blood Cells #


  0.010^3/ul


(0.0-0.0) 


  


  


 


 


Sodium Level


  138mmol/L


(135-144) 


  


  


 


 


Potassium Level


  4.3mmol/L


(3.5-5.1) 


  


  


 


 


Chloride Level


  108mmol/L


() 


  


  


 


 


Carbon Dioxide Level


  27mmol/L


(21-31) 


  


  


 


 


Anion Gap 7 (8-16)    


 


Blood Urea Nitrogen 14mg/dl (7-20)    


 


Creatinine


  0.65mg/dl


(0.44-1.00) 


  


  


 


 


Glucose Level


  169mg/dl


() 


  


  


 


 


Calcium Level


  10.4mg/dl


(8.4-10.2) 


  


  


 


 


Phosphorus Level


  4.6mg/dl


(2.5-4.9) 


  


  


 


 


Magnesium Level


  1.9mg/dl


(1.7-2.5) 


  


  


 


 


Total Bilirubin


  0.7mg/dl


(0.2-1.3) 


  


  


 


 


Direct Bilirubin


  0.00mg/dl


(0.00-0.20) 


  


  


 


 


Indirect Bilirubin


  0.7mg/dl


(0-1.1) 


  


  


 


 


Aspartate Amino Transf


(AST/SGOT) 42IU/L (15-46) 


  


  


  


 


 


Alanine Aminotransferase


(ALT/SGPT) 32IU/L (13-69) 


  


  


  


 


 


Alkaline Phosphatase


  142IU/L


() 


  


  


 


 


Ammonia


  101umol/l


(9-30) 


  


  


 


 


Total Protein


  5.3g/dl


(6.1-8.1) 


  


  


 


 


Albumin


  2.7g/dl


(3.3-4.9) 


  


  


 


 


Globulin


  2.60g/dl


(1.3-3.2) 


  


  


 


 


Albumin/Globulin Ratio 1.03    


 


Bedside Glucose


  


  190mg/dL


() 204mg/dL


() 


 

















JEAN HEALY NP Sep 8, 2017 12:27

## 2017-09-08 NOTE — PDOCDIS
Discharge Instructions


DIAGNOSIS


Discharge Diagnosis


Atypical chest pain.





CONDITION


Patient Condition:  Stable





HOME CARE INSTRUCTIONS:


Special Diet:  Carb controlled diet





OTHER ORDERS:


Other Orders:


1.  Resume home medications.


2.  Follow a carbohydrate controlled diet.


3.  Resume activities as tolerated.


4.  Follow-up with your primary care physician 1 week.


5.  Please call 911 or go to the nearest emergency room if you have any 

significant chest pain.











JEAN HEALY NP Sep 8, 2017 11:18

## 2018-02-28 ENCOUNTER — HOSPITAL ENCOUNTER (INPATIENT)
Age: 68
LOS: 5 days | Discharge: HOME | DRG: 392 | End: 2018-03-05

## 2018-02-28 ENCOUNTER — HOSPITAL ENCOUNTER (INPATIENT)
Dept: HOSPITAL 91 - MS4 | Age: 68
LOS: 5 days | Discharge: HOME | DRG: 392 | End: 2018-03-05
Payer: MEDICARE

## 2018-02-28 DIAGNOSIS — K76.9: ICD-10-CM

## 2018-02-28 DIAGNOSIS — K74.69: ICD-10-CM

## 2018-02-28 DIAGNOSIS — K25.9: ICD-10-CM

## 2018-02-28 DIAGNOSIS — I10: ICD-10-CM

## 2018-02-28 DIAGNOSIS — Z79.4: ICD-10-CM

## 2018-02-28 DIAGNOSIS — K80.00: ICD-10-CM

## 2018-02-28 DIAGNOSIS — F32.9: ICD-10-CM

## 2018-02-28 DIAGNOSIS — E11.65: ICD-10-CM

## 2018-02-28 DIAGNOSIS — K29.70: Primary | ICD-10-CM

## 2018-02-28 DIAGNOSIS — D61.818: ICD-10-CM

## 2018-02-28 DIAGNOSIS — E78.5: ICD-10-CM

## 2018-02-28 DIAGNOSIS — K31.89: ICD-10-CM

## 2018-02-28 DIAGNOSIS — K59.00: ICD-10-CM

## 2018-02-28 DIAGNOSIS — K76.6: ICD-10-CM

## 2018-02-28 LAB
ABNORMAL IP MESSAGE: 1
ADD UMIC: YES
ALANINE AMINOTRANSFERASE: 31 IU/L (ref 13–69)
ALBUMIN/GLOBULIN RATIO: 1.27
ALBUMIN: 3.7 G/DL (ref 3.3–4.9)
ALKALINE PHOSPHATASE: 129 IU/L (ref 42–121)
ANION GAP: 15 (ref 8–16)
ASPARTATE AMINO TRANSFERASE: 30 IU/L (ref 15–46)
BILIRUBIN,DIRECT: 0 MG/DL (ref 0–0.2)
BILIRUBIN,TOTAL: 0.6 MG/DL (ref 0.2–1.3)
BLOOD UREA NITROGEN: 14 MG/DL (ref 7–20)
CALCIUM: 9.3 MG/DL (ref 8.4–10.2)
CARBON DIOXIDE: 21 MMOL/L (ref 21–31)
CHLORIDE: 110 MMOL/L (ref 97–110)
CREATININE: 0.64 MG/DL (ref 0.44–1)
GLOBULIN: 2.9 G/DL (ref 1.3–3.2)
GLUCOSE: 330 MG/DL (ref 70–220)
HEMATOCRIT: 24.5 % (ref 37–47)
HEMOGLOBIN: 7.3 G/DL (ref 12–16)
LIPASE: 161 U/L (ref 23–300)
MEAN CORPUSCULAR HEMOGLOBIN: 21.7 PG (ref 29–33)
MEAN CORPUSCULAR HGB CONC: 29.8 G/DL (ref 32–37)
MEAN CORPUSCULAR VOLUME: 72.7 FL (ref 82–101)
MEAN PLATELET VOLUME: 10.8 FL (ref 7.4–10.4)
NUCLEATED RED BLOOD CELLS%: 0 /100WBC (ref 0–0)
PLATELET COUNT: 63 10^3/UL (ref 140–415)
POSITIVE DIFF: (no result)
POTASSIUM: 4.2 MMOL/L (ref 3.5–5.1)
RED BLOOD COUNT: 3.37 10^6/UL (ref 4.2–5.4)
RED CELL DISTRIBUTION WIDTH: 18 % (ref 11.5–14.5)
SODIUM: 142 MMOL/L (ref 135–144)
TOTAL PROTEIN: 6.6 G/DL (ref 6.1–8.1)
TROPONIN-I: < 0.012 NG/ML (ref 0–0.12)
UR ASCORBIC ACID: 20 MG/DL
UR BACTERIA: (no result) /HPF
UR BILIRUBIN (DIP): NEGATIVE MG/DL
UR BLOOD (DIP): (no result) MG/DL
UR CLARITY: (no result)
UR COLOR: YELLOW
UR GLUCOSE (DIP): (no result) MG/DL
UR KETONES (DIP): NEGATIVE MG/DL
UR LEUKOCYTE ESTERASE (DIP): NEGATIVE LEU/UL
UR NITRITE (DIP): NEGATIVE MG/DL
UR PH (DIP): 5 (ref 5–9)
UR RBC: 0 /HPF (ref 0–5)
UR SPECIFIC GRAVITY (DIP): 1.02 (ref 1–1.03)
UR SQUAMOUS EPITHELIAL CELL: (no result) /HPF
UR TOTAL PROTEIN (DIP): (no result) MG/DL
UR UROBILINOGEN (DIP): NEGATIVE MG/DL
UR WBC: 9 /HPF (ref 0–5)
WHITE BLOOD COUNT: 2.9 10^3/UL (ref 4.8–10.8)

## 2018-02-28 PROCEDURE — 83735 ASSAY OF MAGNESIUM: CPT

## 2018-02-28 PROCEDURE — 86850 RBC ANTIBODY SCREEN: CPT

## 2018-02-28 PROCEDURE — 88312 SPECIAL STAINS GROUP 1: CPT

## 2018-02-28 PROCEDURE — 80048 BASIC METABOLIC PNL TOTAL CA: CPT

## 2018-02-28 PROCEDURE — 83690 ASSAY OF LIPASE: CPT

## 2018-02-28 PROCEDURE — 86708 HEPATITIS A ANTIBODY: CPT

## 2018-02-28 PROCEDURE — 36430 TRANSFUSION BLD/BLD COMPNT: CPT

## 2018-02-28 PROCEDURE — 36415 COLL VENOUS BLD VENIPUNCTURE: CPT

## 2018-02-28 PROCEDURE — 87340 HEPATITIS B SURFACE AG IA: CPT

## 2018-02-28 PROCEDURE — 83540 ASSAY OF IRON: CPT

## 2018-02-28 PROCEDURE — 82962 GLUCOSE BLOOD TEST: CPT

## 2018-02-28 PROCEDURE — 71045 X-RAY EXAM CHEST 1 VIEW: CPT

## 2018-02-28 PROCEDURE — 84484 ASSAY OF TROPONIN QUANT: CPT

## 2018-02-28 PROCEDURE — 76705 ECHO EXAM OF ABDOMEN: CPT

## 2018-02-28 PROCEDURE — 86900 BLOOD TYPING SEROLOGIC ABO: CPT

## 2018-02-28 PROCEDURE — 85025 COMPLETE CBC W/AUTO DIFF WBC: CPT

## 2018-02-28 PROCEDURE — 86920 COMPATIBILITY TEST SPIN: CPT

## 2018-02-28 PROCEDURE — 85730 THROMBOPLASTIN TIME PARTIAL: CPT

## 2018-02-28 PROCEDURE — 99285 EMERGENCY DEPT VISIT HI MDM: CPT

## 2018-02-28 PROCEDURE — 80053 COMPREHEN METABOLIC PANEL: CPT

## 2018-02-28 PROCEDURE — 85610 PROTHROMBIN TIME: CPT

## 2018-02-28 PROCEDURE — 84100 ASSAY OF PHOSPHORUS: CPT

## 2018-02-28 PROCEDURE — 86803 HEPATITIS C AB TEST: CPT

## 2018-02-28 PROCEDURE — 88305 TISSUE EXAM BY PATHOLOGIST: CPT

## 2018-02-28 PROCEDURE — 96374 THER/PROPH/DIAG INJ IV PUSH: CPT

## 2018-02-28 PROCEDURE — 86706 HEP B SURFACE ANTIBODY: CPT

## 2018-02-28 PROCEDURE — 78226 HEPATOBILIARY SYSTEM IMAGING: CPT

## 2018-02-28 PROCEDURE — 86704 HEP B CORE ANTIBODY TOTAL: CPT

## 2018-02-28 PROCEDURE — 93005 ELECTROCARDIOGRAM TRACING: CPT

## 2018-02-28 PROCEDURE — 86901 BLOOD TYPING SEROLOGIC RH(D): CPT

## 2018-02-28 PROCEDURE — 74018 RADEX ABDOMEN 1 VIEW: CPT

## 2018-02-28 PROCEDURE — 96375 TX/PRO/DX INJ NEW DRUG ADDON: CPT

## 2018-02-28 PROCEDURE — 81001 URINALYSIS AUTO W/SCOPE: CPT

## 2018-02-28 PROCEDURE — 83036 HEMOGLOBIN GLYCOSYLATED A1C: CPT

## 2018-02-28 PROCEDURE — 86709 HEPATITIS A IGM ANTIBODY: CPT

## 2018-02-28 RX ADMIN — THIAMINE HYDROCHLORIDE 1 MLS/HR: 100 INJECTION, SOLUTION INTRAMUSCULAR; INTRAVENOUS at 23:15

## 2018-02-28 RX ADMIN — PANTOPRAZOLE SODIUM 1 MG: 40 INJECTION, POWDER, FOR SOLUTION INTRAVENOUS at 23:26

## 2018-02-28 RX ADMIN — SODIUM CHLORIDE 1 MLS/HR: 9 INJECTION, SOLUTION INTRAVENOUS at 23:54

## 2018-02-28 RX ADMIN — FAMOTIDINE 1 MG: 20 TABLET ORAL at 23:27

## 2018-02-28 RX ADMIN — HYDROCODONE BITARTRATE AND ACETAMINOPHEN 1 TAB: 5; 325 TABLET ORAL at 23:27

## 2018-02-28 RX ADMIN — PHENOBARBITAL, HYOSCYAMINE SULFATE, ATROPINE SULFATE, SCOPOLAMINE HYDROBROMIDE 1 TAB: 16.2; .1037; .0194; .0065 TABLET ORAL at 23:27

## 2018-02-28 RX ADMIN — ALUMINUM HYDROXIDE, MAGNESIUM HYDROXIDE, DIMETHICONE 1 ML: 200; 200; 20 SUSPENSION ORAL at 23:26

## 2018-02-28 RX ADMIN — ONDANSETRON HYDROCHLORIDE 1 MG: 2 INJECTION, SOLUTION INTRAMUSCULAR; INTRAVENOUS at 23:26

## 2018-03-01 LAB
ABNORMAL IP MESSAGE: 1
ADD MAN DIFF?: YES
ALANINE AMINOTRANSFERASE: 26 IU/L (ref 13–69)
ALBUMIN/GLOBULIN RATIO: 1.06
ALBUMIN: 3.1 G/DL (ref 3.3–4.9)
ALKALINE PHOSPHATASE: 90 IU/L (ref 42–121)
ANION GAP: 14 (ref 8–16)
ANISOCYTOSIS: (no result) (ref 0–0)
ASPARTATE AMINO TRANSFERASE: 28 IU/L (ref 15–46)
BILIRUBIN,DIRECT: 0 MG/DL (ref 0–0.2)
BILIRUBIN,TOTAL: 1.2 MG/DL (ref 0.2–1.3)
BLOOD UREA NITROGEN: 10 MG/DL (ref 7–20)
CALCIUM: 8.9 MG/DL (ref 8.4–10.2)
CARBON DIOXIDE: 24 MMOL/L (ref 21–31)
CHLORIDE: 109 MMOL/L (ref 97–110)
CREATININE: 0.65 MG/DL (ref 0.44–1)
EOSINOPHILS % (M): 3 % (ref 0–7)
GLOBULIN: 2.9 G/DL (ref 1.3–3.2)
GLUCOSE: 264 MG/DL (ref 70–220)
HEMATOCRIT: 24.7 % (ref 37–47)
HEMOGLOBIN A1C: 8.7 % (ref 0–5.9)
HEMOGLOBIN: 7.5 G/DL (ref 12–16)
HEPATITIS B CORE ANTIBODY: NEGATIVE
HEPATITIS B SURFACE ANTIBODY: NEGATIVE
HEPATITIS B SURFACE ANTIGEN: NEGATIVE
HEPATITIS C VIRAL ANTIBODY: NEGATIVE
INR: 1.34
LYMPHOCYTES #M: 0.5 10^3/UL (ref 0.8–2.9)
LYMPHOCYTES % (M): 24 % (ref 15–51)
MEAN CORPUSCULAR HEMOGLOBIN: 22.9 PG (ref 29–33)
MEAN CORPUSCULAR HGB CONC: 30.4 G/DL (ref 32–37)
MEAN CORPUSCULAR VOLUME: 75.3 FL (ref 82–101)
MEAN PLATELET VOLUME: 11 FL (ref 7.4–10.4)
MICROCYTOSIS: (no result) (ref 0–0)
MONOCYTE #M: 0.2 10^3/UL (ref 0.3–0.9)
MONOCYTES % (M): 13 % (ref 0–11)
NUCLEATED RED BLOOD CELLS%: 0 /100WBC (ref 0–0)
PARTIAL THROMBOPLASTIN TIME: 29.7 SEC (ref 25–35)
PLATELET COUNT: 54 10^3/UL (ref 140–415)
PLATELET ESTIMATE: (no result)
POIKILOCYTOSIS: (no result) (ref 0–0)
POSITIVE DIFF: (no result)
POTASSIUM: 4.3 MMOL/L (ref 3.5–5.1)
PROTIME: 16.8 SEC (ref 11.9–14.9)
PT RATIO: 1.3
RED BLOOD COUNT: 3.28 10^6/UL (ref 4.2–5.4)
RED CELL DISTRIBUTION WIDTH: 17.7 % (ref 11.5–14.5)
SEGMENTED NEUTROPHILS (M) %: 60 % (ref 39–77)
SMUDGE%M: 2 % (ref 0–0)
SODIUM: 143 MMOL/L (ref 135–144)
TOTAL PROTEIN: 6 G/DL (ref 6.1–8.1)
WHITE BLOOD COUNT: 2.2 10^3/UL (ref 4.8–10.8)

## 2018-03-01 RX ADMIN — FERROUS SULFATE TAB 325 MG (65 MG ELEMENTAL FE) 1 MG: 325 (65 FE) TAB at 21:37

## 2018-03-01 RX ADMIN — ISOSORBIDE MONONITRATE 1 MG: 20 TABLET ORAL at 21:37

## 2018-03-01 RX ADMIN — INSULIN ASPART 1 UNIT: 100 INJECTION, SOLUTION INTRAVENOUS; SUBCUTANEOUS at 21:40

## 2018-03-01 RX ADMIN — METRONIDAZOLE 1 MLS/HR: 500 SOLUTION INTRAVENOUS at 15:27

## 2018-03-01 RX ADMIN — FUROSEMIDE 1 MG: 20 TABLET ORAL at 08:59

## 2018-03-01 RX ADMIN — INSULIN ASPART 1 UNIT: 100 INJECTION, SOLUTION INTRAVENOUS; SUBCUTANEOUS at 12:13

## 2018-03-01 RX ADMIN — INSULIN ASPART 1 UNIT: 100 INJECTION, SOLUTION INTRAVENOUS; SUBCUTANEOUS at 17:29

## 2018-03-01 RX ADMIN — RIFAXIMIN 1 MG: 550 TABLET ORAL at 21:36

## 2018-03-01 RX ADMIN — FERROUS SULFATE TAB 325 MG (65 MG ELEMENTAL FE) 1 MG: 325 (65 FE) TAB at 12:19

## 2018-03-01 RX ADMIN — GABAPENTIN 1 MG: 100 CAPSULE ORAL at 08:58

## 2018-03-01 RX ADMIN — GABAPENTIN 1 MG: 100 CAPSULE ORAL at 12:14

## 2018-03-01 RX ADMIN — ISOSORBIDE MONONITRATE 1 MG: 20 TABLET ORAL at 08:59

## 2018-03-01 RX ADMIN — METRONIDAZOLE 1 MLS/HR: 500 SOLUTION INTRAVENOUS at 21:35

## 2018-03-01 RX ADMIN — LACTULOSE 1 GM: 20 SOLUTION ORAL at 21:36

## 2018-03-01 RX ADMIN — INSULIN DETEMIR 1 UNIT: 100 INJECTION, SOLUTION SUBCUTANEOUS at 03:40

## 2018-03-01 RX ADMIN — LEVOFLOXACIN 1 MLS/HR: 500 INJECTION, SOLUTION INTRAVENOUS at 12:35

## 2018-03-01 RX ADMIN — GABAPENTIN 1 MG: 100 CAPSULE ORAL at 21:37

## 2018-03-01 RX ADMIN — INSULIN ASPART 1 UNIT: 100 INJECTION, SOLUTION INTRAVENOUS; SUBCUTANEOUS at 08:00

## 2018-03-01 RX ADMIN — RIFAXIMIN 1 MG: 550 TABLET ORAL at 09:00

## 2018-03-01 RX ADMIN — FERROUS SULFATE TAB 325 MG (65 MG ELEMENTAL FE) 1 MG: 325 (65 FE) TAB at 08:58

## 2018-03-01 RX ADMIN — INSULIN DETEMIR 1 UNIT: 100 INJECTION, SOLUTION SUBCUTANEOUS at 21:39

## 2018-03-01 RX ADMIN — LACTULOSE 1 GM: 20 SOLUTION ORAL at 08:59

## 2018-03-01 RX ADMIN — PANTOPRAZOLE SODIUM 1 MG: 40 INJECTION, POWDER, FOR SOLUTION INTRAVENOUS at 06:44

## 2018-03-02 LAB
ABNORMAL IP MESSAGE: 1
ADD MAN DIFF?: NO
ANION GAP: 12 (ref 8–16)
BASOPHIL #: 0 10^3/UL (ref 0–0.1)
BASOPHILS %: 0.7 % (ref 0–2)
BLOOD UREA NITROGEN: 10 MG/DL (ref 7–20)
CALCIUM: 8.5 MG/DL (ref 8.4–10.2)
CARBON DIOXIDE: 25 MMOL/L (ref 21–31)
CHLORIDE: 111 MMOL/L (ref 97–110)
CREATININE: 0.62 MG/DL (ref 0.44–1)
EOSINOPHILS #: 0.1 10^3/UL (ref 0–0.5)
EOSINOPHILS %: 2.7 % (ref 0–7)
GLUCOSE: 199 MG/DL (ref 70–220)
HEMATOCRIT: 23.4 % (ref 37–47)
HEMOGLOBIN: 7.3 G/DL (ref 12–16)
LYMPHOCYTES #: 0.5 10^3/UL (ref 0.8–2.9)
LYMPHOCYTES %: 16.8 % (ref 15–51)
MAGNESIUM: 1.6 MG/DL (ref 1.7–2.5)
MEAN CORPUSCULAR HEMOGLOBIN: 23.1 PG (ref 29–33)
MEAN CORPUSCULAR HGB CONC: 31.2 G/DL (ref 32–37)
MEAN CORPUSCULAR VOLUME: 74.1 FL (ref 82–101)
MEAN PLATELET VOLUME: 10.5 FL (ref 7.4–10.4)
MONOCYTE #: 0.3 10^3/UL (ref 0.3–0.9)
MONOCYTES %: 11 % (ref 0–11)
NEUTROPHIL #: 2 10^3/UL (ref 1.6–7.5)
NEUTROPHILS %: 68.1 % (ref 39–77)
NUCLEATED RED BLOOD CELLS #: 0 10^3/UL (ref 0–0)
NUCLEATED RED BLOOD CELLS%: 0 /100WBC (ref 0–0)
PHOSPHORUS: 2.9 MG/DL (ref 2.5–4.9)
PLATELET COUNT: 50 10^3/UL (ref 140–415)
POSITIVE DIFF: (no result)
POTASSIUM: 4 MMOL/L (ref 3.5–5.1)
RED BLOOD COUNT: 3.16 10^6/UL (ref 4.2–5.4)
RED CELL DISTRIBUTION WIDTH: 18.2 % (ref 11.5–14.5)
SODIUM: 144 MMOL/L (ref 135–144)
WHITE BLOOD COUNT: 2.9 10^3/UL (ref 4.8–10.8)

## 2018-03-02 PROCEDURE — 0DB68ZX EXCISION OF STOMACH, VIA NATURAL OR ARTIFICIAL OPENING ENDOSCOPIC, DIAGNOSTIC: ICD-10-PCS

## 2018-03-02 PROCEDURE — 30233N1 TRANSFUSION OF NONAUTOLOGOUS RED BLOOD CELLS INTO PERIPHERAL VEIN, PERCUTANEOUS APPROACH: ICD-10-PCS

## 2018-03-02 RX ADMIN — METRONIDAZOLE 1 MLS/HR: 500 SOLUTION INTRAVENOUS at 15:59

## 2018-03-02 RX ADMIN — LACTULOSE 1 GM: 20 SOLUTION ORAL at 21:55

## 2018-03-02 RX ADMIN — ISOSORBIDE MONONITRATE 1 MG: 20 TABLET ORAL at 08:07

## 2018-03-02 RX ADMIN — FERROUS SULFATE TAB 325 MG (65 MG ELEMENTAL FE) 1 MG: 325 (65 FE) TAB at 20:56

## 2018-03-02 RX ADMIN — INSULIN ASPART 1 UNIT: 100 INJECTION, SOLUTION INTRAVENOUS; SUBCUTANEOUS at 20:59

## 2018-03-02 RX ADMIN — PANTOPRAZOLE SODIUM 1 MG: 40 INJECTION, POWDER, FOR SOLUTION INTRAVENOUS at 05:48

## 2018-03-02 RX ADMIN — LEVOFLOXACIN 1 MLS/HR: 500 INJECTION, SOLUTION INTRAVENOUS at 13:04

## 2018-03-02 RX ADMIN — METRONIDAZOLE 1 MLS/HR: 500 SOLUTION INTRAVENOUS at 05:48

## 2018-03-02 RX ADMIN — PROPOFOL 1: 10 INJECTION, EMULSION INTRAVENOUS at 18:57

## 2018-03-02 RX ADMIN — INSULIN ASPART 1 UNIT: 100 INJECTION, SOLUTION INTRAVENOUS; SUBCUTANEOUS at 18:05

## 2018-03-02 RX ADMIN — FERROUS SULFATE TAB 325 MG (65 MG ELEMENTAL FE) 1 MG: 325 (65 FE) TAB at 08:07

## 2018-03-02 RX ADMIN — INSULIN DETEMIR 1 UNIT: 100 INJECTION, SOLUTION SUBCUTANEOUS at 20:57

## 2018-03-02 RX ADMIN — ISOSORBIDE MONONITRATE 1 MG: 20 TABLET ORAL at 20:56

## 2018-03-02 RX ADMIN — METRONIDAZOLE 1 MLS/HR: 500 SOLUTION INTRAVENOUS at 21:55

## 2018-03-02 RX ADMIN — MAGNESIUM SULFATE HEPTAHYDRATE 1 MLS/HR: 40 INJECTION, SOLUTION INTRAVENOUS at 15:58

## 2018-03-02 RX ADMIN — FERROUS SULFATE TAB 325 MG (65 MG ELEMENTAL FE) 1 MG: 325 (65 FE) TAB at 11:10

## 2018-03-02 RX ADMIN — LACTULOSE 1 GM: 20 SOLUTION ORAL at 08:07

## 2018-03-02 RX ADMIN — GABAPENTIN 1 MG: 100 CAPSULE ORAL at 08:08

## 2018-03-02 RX ADMIN — GABAPENTIN 1 MG: 100 CAPSULE ORAL at 20:56

## 2018-03-02 RX ADMIN — GABAPENTIN 1 MG: 100 CAPSULE ORAL at 11:11

## 2018-03-02 RX ADMIN — INSULIN ASPART 1 UNIT: 100 INJECTION, SOLUTION INTRAVENOUS; SUBCUTANEOUS at 08:35

## 2018-03-02 RX ADMIN — FUROSEMIDE 1 MG: 20 TABLET ORAL at 08:07

## 2018-03-02 RX ADMIN — MAGNESIUM SULFATE HEPTAHYDRATE 1 MLS/HR: 40 INJECTION, SOLUTION INTRAVENOUS at 18:57

## 2018-03-02 RX ADMIN — RIFAXIMIN 1 MG: 550 TABLET ORAL at 20:56

## 2018-03-02 RX ADMIN — INSULIN ASPART 1 UNIT: 100 INJECTION, SOLUTION INTRAVENOUS; SUBCUTANEOUS at 12:00

## 2018-03-02 RX ADMIN — RIFAXIMIN 1 MG: 550 TABLET ORAL at 08:08

## 2018-03-03 LAB
% IRON SATURATION: 46 % SAT (ref 22–52)
ABNORMAL IP MESSAGE: 1
ADD MAN DIFF?: NO
ANION GAP: 14 (ref 8–16)
BASOPHIL #: 0 10^3/UL (ref 0–0.1)
BASOPHILS %: 1 % (ref 0–2)
BLOOD UREA NITROGEN: 11 MG/DL (ref 7–20)
CALCIUM: 8.6 MG/DL (ref 8.4–10.2)
CARBON DIOXIDE: 23 MMOL/L (ref 21–31)
CHLORIDE: 113 MMOL/L (ref 97–110)
CREATININE: 0.65 MG/DL (ref 0.44–1)
EOSINOPHILS #: 0.1 10^3/UL (ref 0–0.5)
EOSINOPHILS %: 2 % (ref 0–7)
GLUCOSE: 113 MG/DL (ref 70–220)
HEMATOCRIT: 24.2 % (ref 37–47)
HEMOGLOBIN: 7.4 G/DL (ref 12–16)
IMMEDIATE SPIN CROSSMATCH: 1 2
IRON: 182 UG/DL (ref 35–150)
LYMPHOCYTES #: 0.6 10^3/UL (ref 0.8–2.9)
LYMPHOCYTES %: 20.8 % (ref 15–51)
MAGNESIUM: 2 MG/DL (ref 1.7–2.5)
MEAN CORPUSCULAR HEMOGLOBIN: 23 PG (ref 29–33)
MEAN CORPUSCULAR HGB CONC: 30.6 G/DL (ref 32–37)
MEAN CORPUSCULAR VOLUME: 75.2 FL (ref 82–101)
MEAN PLATELET VOLUME: 11.1 FL (ref 7.4–10.4)
MONOCYTE #: 0.4 10^3/UL (ref 0.3–0.9)
MONOCYTES %: 13 % (ref 0–11)
NEUTROPHIL #: 1.8 10^3/UL (ref 1.6–7.5)
NEUTROPHILS %: 62.5 % (ref 39–77)
NUCLEATED RED BLOOD CELLS #: 0 10^3/UL (ref 0–0)
NUCLEATED RED BLOOD CELLS%: 0 /100WBC (ref 0–0)
PLATELET COUNT: 60 10^3/UL (ref 140–415)
POSITIVE DIFF: (no result)
POTASSIUM: 3.8 MMOL/L (ref 3.5–5.1)
RED BLOOD COUNT: 3.22 10^6/UL (ref 4.2–5.4)
RED CELL DISTRIBUTION WIDTH: 18.1 % (ref 11.5–14.5)
SODIUM: 146 MMOL/L (ref 135–144)
TOTAL IRON BINDING CAPACITY: 399 UG/DL (ref 241–421)
WHITE BLOOD COUNT: 2.9 10^3/UL (ref 4.8–10.8)

## 2018-03-03 RX ADMIN — GABAPENTIN 1 MG: 100 CAPSULE ORAL at 20:45

## 2018-03-03 RX ADMIN — GABAPENTIN 1 MG: 100 CAPSULE ORAL at 13:17

## 2018-03-03 RX ADMIN — METRONIDAZOLE 1 MLS/HR: 500 SOLUTION INTRAVENOUS at 22:21

## 2018-03-03 RX ADMIN — RIFAXIMIN 1 MG: 550 TABLET ORAL at 08:36

## 2018-03-03 RX ADMIN — METRONIDAZOLE 1 MLS/HR: 500 SOLUTION INTRAVENOUS at 16:41

## 2018-03-03 RX ADMIN — FERROUS SULFATE TAB 325 MG (65 MG ELEMENTAL FE) 1 MG: 325 (65 FE) TAB at 08:36

## 2018-03-03 RX ADMIN — LEVOFLOXACIN 1 MLS/HR: 500 INJECTION, SOLUTION INTRAVENOUS at 11:14

## 2018-03-03 RX ADMIN — LACTULOSE 1 GM: 20 SOLUTION ORAL at 08:36

## 2018-03-03 RX ADMIN — PANTOPRAZOLE SODIUM 1 MG: 40 INJECTION, POWDER, FOR SOLUTION INTRAVENOUS at 05:46

## 2018-03-03 RX ADMIN — METRONIDAZOLE 1 MLS/HR: 500 SOLUTION INTRAVENOUS at 05:46

## 2018-03-03 RX ADMIN — INSULIN ASPART 1 UNIT: 100 INJECTION, SOLUTION INTRAVENOUS; SUBCUTANEOUS at 11:41

## 2018-03-03 RX ADMIN — INSULIN ASPART 1 UNIT: 100 INJECTION, SOLUTION INTRAVENOUS; SUBCUTANEOUS at 20:55

## 2018-03-03 RX ADMIN — FERROUS SULFATE TAB 325 MG (65 MG ELEMENTAL FE) 1 MG: 325 (65 FE) TAB at 13:18

## 2018-03-03 RX ADMIN — INSULIN DETEMIR 1 UNIT: 100 INJECTION, SOLUTION SUBCUTANEOUS at 20:56

## 2018-03-03 RX ADMIN — INSULIN ASPART 1 UNIT: 100 INJECTION, SOLUTION INTRAVENOUS; SUBCUTANEOUS at 16:36

## 2018-03-03 RX ADMIN — INSULIN ASPART 1 UNIT: 100 INJECTION, SOLUTION INTRAVENOUS; SUBCUTANEOUS at 08:49

## 2018-03-03 RX ADMIN — LACTULOSE 1 GM: 20 SOLUTION ORAL at 20:45

## 2018-03-03 RX ADMIN — ISOSORBIDE MONONITRATE 1 MG: 20 TABLET ORAL at 08:36

## 2018-03-03 RX ADMIN — ISOSORBIDE MONONITRATE 1 MG: 20 TABLET ORAL at 20:46

## 2018-03-03 RX ADMIN — LACTULOSE 1 GM: 20 SOLUTION ORAL at 13:17

## 2018-03-03 RX ADMIN — FERROUS SULFATE TAB 325 MG (65 MG ELEMENTAL FE) 1 MG: 325 (65 FE) TAB at 20:45

## 2018-03-03 RX ADMIN — FUROSEMIDE 1 MG: 20 TABLET ORAL at 08:36

## 2018-03-03 RX ADMIN — GABAPENTIN 1 MG: 100 CAPSULE ORAL at 08:36

## 2018-03-03 RX ADMIN — RIFAXIMIN 1 MG: 550 TABLET ORAL at 20:45

## 2018-03-03 RX ADMIN — ABCIXIMAB 1 MLS/HR: 2 INJECTION, SOLUTION INTRAVENOUS at 13:11

## 2018-03-03 RX ADMIN — INSULIN ASPART 1 UNIT: 100 INJECTION, SOLUTION INTRAVENOUS; SUBCUTANEOUS at 11:42

## 2018-03-03 RX ADMIN — INSULIN ASPART 1 UNIT: 100 INJECTION, SOLUTION INTRAVENOUS; SUBCUTANEOUS at 16:25

## 2018-03-03 RX ADMIN — LIDOCAINE HYDROCHLORIDE 1 MLS/HR: 10 INJECTION, SOLUTION EPIDURAL; INFILTRATION; INTRACAUDAL; PERINEURAL at 10:24

## 2018-03-04 LAB
ABNORMAL IP MESSAGE: 1
ADD MAN DIFF?: NO
ANION GAP: 13 (ref 8–16)
BASOPHIL #: 0 10^3/UL (ref 0–0.1)
BASOPHILS %: 1.2 % (ref 0–2)
BLOOD UREA NITROGEN: 8 MG/DL (ref 7–20)
CALCIUM: 9.1 MG/DL (ref 8.4–10.2)
CARBON DIOXIDE: 25 MMOL/L (ref 21–31)
CHLORIDE: 111 MMOL/L (ref 97–110)
CREATININE: 0.6 MG/DL (ref 0.44–1)
EOSINOPHILS #: 0.1 10^3/UL (ref 0–0.5)
EOSINOPHILS %: 3.2 % (ref 0–7)
GLUCOSE: 149 MG/DL (ref 70–220)
HEMATOCRIT: 29 % (ref 37–47)
HEMOGLOBIN: 8.9 G/DL (ref 12–16)
LYMPHOCYTES #: 0.7 10^3/UL (ref 0.8–2.9)
LYMPHOCYTES %: 20.7 % (ref 15–51)
MEAN CORPUSCULAR HEMOGLOBIN: 23.6 PG (ref 29–33)
MEAN CORPUSCULAR HGB CONC: 30.7 G/DL (ref 32–37)
MEAN CORPUSCULAR VOLUME: 76.9 FL (ref 82–101)
MEAN PLATELET VOLUME: 10.7 FL (ref 7.4–10.4)
MONOCYTE #: 0.4 10^3/UL (ref 0.3–0.9)
MONOCYTES %: 11.5 % (ref 0–11)
NEUTROPHIL #: 2.2 10^3/UL (ref 1.6–7.5)
NEUTROPHILS %: 62.8 % (ref 39–77)
NUCLEATED RED BLOOD CELLS #: 0 10^3/UL (ref 0–0)
NUCLEATED RED BLOOD CELLS%: 0 /100WBC (ref 0–0)
PLATELET COUNT: 65 10^3/UL (ref 140–415)
POSITIVE DIFF: (no result)
POTASSIUM: 3.6 MMOL/L (ref 3.5–5.1)
RED BLOOD COUNT: 3.77 10^6/UL (ref 4.2–5.4)
RED CELL DISTRIBUTION WIDTH: 19.2 % (ref 11.5–14.5)
SODIUM: 145 MMOL/L (ref 135–144)
WHITE BLOOD COUNT: 3.5 10^3/UL (ref 4.8–10.8)

## 2018-03-04 RX ADMIN — INSULIN ASPART 1 UNIT: 100 INJECTION, SOLUTION INTRAVENOUS; SUBCUTANEOUS at 08:13

## 2018-03-04 RX ADMIN — FERROUS SULFATE TAB 325 MG (65 MG ELEMENTAL FE) 1 MG: 325 (65 FE) TAB at 09:25

## 2018-03-04 RX ADMIN — GABAPENTIN 1 MG: 100 CAPSULE ORAL at 12:31

## 2018-03-04 RX ADMIN — LACTULOSE 1 GM: 20 SOLUTION ORAL at 09:25

## 2018-03-04 RX ADMIN — LACTULOSE 1 GM: 20 SOLUTION ORAL at 21:31

## 2018-03-04 RX ADMIN — INSULIN ASPART 1 UNIT: 100 INJECTION, SOLUTION INTRAVENOUS; SUBCUTANEOUS at 12:20

## 2018-03-04 RX ADMIN — ISOSORBIDE MONONITRATE 1 MG: 20 TABLET ORAL at 09:25

## 2018-03-04 RX ADMIN — INSULIN ASPART 1 UNIT: 100 INJECTION, SOLUTION INTRAVENOUS; SUBCUTANEOUS at 21:35

## 2018-03-04 RX ADMIN — METRONIDAZOLE 1 MLS/HR: 500 SOLUTION INTRAVENOUS at 21:35

## 2018-03-04 RX ADMIN — FUROSEMIDE 1 MG: 20 TABLET ORAL at 09:25

## 2018-03-04 RX ADMIN — INSULIN ASPART 1 UNIT: 100 INJECTION, SOLUTION INTRAVENOUS; SUBCUTANEOUS at 12:21

## 2018-03-04 RX ADMIN — METRONIDAZOLE 1 MLS/HR: 500 SOLUTION INTRAVENOUS at 13:53

## 2018-03-04 RX ADMIN — RIFAXIMIN 1 MG: 550 TABLET ORAL at 21:31

## 2018-03-04 RX ADMIN — LACTULOSE 1 GM: 20 SOLUTION ORAL at 12:31

## 2018-03-04 RX ADMIN — ISOSORBIDE MONONITRATE 1 MG: 20 TABLET ORAL at 21:32

## 2018-03-04 RX ADMIN — FERROUS SULFATE TAB 325 MG (65 MG ELEMENTAL FE) 1 MG: 325 (65 FE) TAB at 21:31

## 2018-03-04 RX ADMIN — INSULIN ASPART 1 UNIT: 100 INJECTION, SOLUTION INTRAVENOUS; SUBCUTANEOUS at 08:00

## 2018-03-04 RX ADMIN — INSULIN DETEMIR 1 UNIT: 100 INJECTION, SOLUTION SUBCUTANEOUS at 21:35

## 2018-03-04 RX ADMIN — PANTOPRAZOLE SODIUM 1 MG: 40 INJECTION, POWDER, FOR SOLUTION INTRAVENOUS at 05:21

## 2018-03-04 RX ADMIN — METRONIDAZOLE 1 MLS/HR: 500 SOLUTION INTRAVENOUS at 05:21

## 2018-03-04 RX ADMIN — LEVOFLOXACIN 1 MLS/HR: 500 INJECTION, SOLUTION INTRAVENOUS at 12:31

## 2018-03-04 RX ADMIN — GABAPENTIN 1 MG: 100 CAPSULE ORAL at 21:31

## 2018-03-04 RX ADMIN — RIFAXIMIN 1 MG: 550 TABLET ORAL at 09:26

## 2018-03-04 RX ADMIN — GABAPENTIN 1 MG: 100 CAPSULE ORAL at 09:25

## 2018-03-04 RX ADMIN — INSULIN ASPART 1 UNIT: 100 INJECTION, SOLUTION INTRAVENOUS; SUBCUTANEOUS at 17:16

## 2018-03-04 RX ADMIN — FERROUS SULFATE TAB 325 MG (65 MG ELEMENTAL FE) 1 MG: 325 (65 FE) TAB at 12:31

## 2018-03-05 LAB
ABNORMAL IP MESSAGE: 1
ADD MAN DIFF?: NO
ANION GAP: 12 (ref 8–16)
BASOPHIL #: 0 10^3/UL (ref 0–0.1)
BASOPHILS %: 0.7 % (ref 0–2)
BLOOD UREA NITROGEN: 10 MG/DL (ref 7–20)
CALCIUM: 9.2 MG/DL (ref 8.4–10.2)
CARBON DIOXIDE: 25 MMOL/L (ref 21–31)
CHLORIDE: 111 MMOL/L (ref 97–110)
CREATININE: 0.61 MG/DL (ref 0.44–1)
EOSINOPHILS #: 0.1 10^3/UL (ref 0–0.5)
EOSINOPHILS %: 3 % (ref 0–7)
GLUCOSE: 173 MG/DL (ref 70–220)
HEMATOCRIT: 29.6 % (ref 37–47)
HEMOGLOBIN: 9.2 G/DL (ref 12–16)
LYMPHOCYTES #: 0.9 10^3/UL (ref 0.8–2.9)
LYMPHOCYTES %: 21 % (ref 15–51)
MEAN CORPUSCULAR HEMOGLOBIN: 24.1 PG (ref 29–33)
MEAN CORPUSCULAR HGB CONC: 31.1 G/DL (ref 32–37)
MEAN CORPUSCULAR VOLUME: 77.5 FL (ref 82–101)
MEAN PLATELET VOLUME: 10.9 FL (ref 7.4–10.4)
MONOCYTE #: 0.5 10^3/UL (ref 0.3–0.9)
MONOCYTES %: 11.4 % (ref 0–11)
NEUTROPHIL #: 2.8 10^3/UL (ref 1.6–7.5)
NEUTROPHILS %: 63.4 % (ref 39–77)
NUCLEATED RED BLOOD CELLS #: 0 10^3/UL (ref 0–0)
NUCLEATED RED BLOOD CELLS%: 0 /100WBC (ref 0–0)
PLATELET COUNT: 66 10^3/UL (ref 140–415)
POSITIVE DIFF: (no result)
POTASSIUM: 3.8 MMOL/L (ref 3.5–5.1)
RED BLOOD COUNT: 3.82 10^6/UL (ref 4.2–5.4)
RED CELL DISTRIBUTION WIDTH: 20.5 % (ref 11.5–14.5)
SODIUM: 144 MMOL/L (ref 135–144)
WHITE BLOOD COUNT: 4.4 10^3/UL (ref 4.8–10.8)

## 2018-03-05 RX ADMIN — FUROSEMIDE 1 MG: 20 TABLET ORAL at 09:08

## 2018-03-05 RX ADMIN — LEVOFLOXACIN 1 MLS/HR: 500 INJECTION, SOLUTION INTRAVENOUS at 11:18

## 2018-03-05 RX ADMIN — ISOSORBIDE MONONITRATE 1 MG: 20 TABLET ORAL at 11:18

## 2018-03-05 RX ADMIN — FERROUS SULFATE TAB 325 MG (65 MG ELEMENTAL FE) 1 MG: 325 (65 FE) TAB at 13:34

## 2018-03-05 RX ADMIN — INSULIN ASPART 1 UNIT: 100 INJECTION, SOLUTION INTRAVENOUS; SUBCUTANEOUS at 12:11

## 2018-03-05 RX ADMIN — LACTULOSE 1 GM: 20 SOLUTION ORAL at 13:34

## 2018-03-05 RX ADMIN — METRONIDAZOLE 1 MLS/HR: 500 SOLUTION INTRAVENOUS at 06:27

## 2018-03-05 RX ADMIN — LACTULOSE 1 GM: 20 SOLUTION ORAL at 09:11

## 2018-03-05 RX ADMIN — GABAPENTIN 1 MG: 100 CAPSULE ORAL at 13:34

## 2018-03-05 RX ADMIN — FERROUS SULFATE TAB 325 MG (65 MG ELEMENTAL FE) 1 MG: 325 (65 FE) TAB at 09:08

## 2018-03-05 RX ADMIN — INSULIN ASPART 1 UNIT: 100 INJECTION, SOLUTION INTRAVENOUS; SUBCUTANEOUS at 08:27

## 2018-03-05 RX ADMIN — METRONIDAZOLE 1 MLS/HR: 500 SOLUTION INTRAVENOUS at 13:34

## 2018-03-05 RX ADMIN — GABAPENTIN 1 MG: 100 CAPSULE ORAL at 09:08

## 2018-03-05 RX ADMIN — INSULIN ASPART 1 UNIT: 100 INJECTION, SOLUTION INTRAVENOUS; SUBCUTANEOUS at 12:13

## 2018-03-05 RX ADMIN — RIFAXIMIN 1 MG: 550 TABLET ORAL at 09:08

## 2018-03-05 RX ADMIN — INSULIN ASPART 1 UNIT: 100 INJECTION, SOLUTION INTRAVENOUS; SUBCUTANEOUS at 08:26

## 2018-03-05 RX ADMIN — PANTOPRAZOLE SODIUM 1 MG: 40 INJECTION, POWDER, FOR SOLUTION INTRAVENOUS at 06:27

## 2018-08-05 ENCOUNTER — HOSPITAL ENCOUNTER (EMERGENCY)
Dept: HOSPITAL 91 - E/R | Age: 68
LOS: 2 days | Discharge: HOME | End: 2018-08-07
Payer: MEDICARE

## 2018-08-05 DIAGNOSIS — E10.43: Primary | ICD-10-CM

## 2018-08-05 DIAGNOSIS — Z79.4: ICD-10-CM

## 2018-08-05 DIAGNOSIS — Z88.6: ICD-10-CM

## 2018-08-05 DIAGNOSIS — D61.818: ICD-10-CM

## 2018-08-05 DIAGNOSIS — K31.84: ICD-10-CM

## 2018-08-05 DIAGNOSIS — R53.81: ICD-10-CM

## 2018-08-05 DIAGNOSIS — K29.70: ICD-10-CM

## 2018-08-05 DIAGNOSIS — E10.65: ICD-10-CM

## 2018-08-05 DIAGNOSIS — K59.00: ICD-10-CM

## 2018-08-05 DIAGNOSIS — K72.90: ICD-10-CM

## 2018-08-05 LAB
ABNORMAL IP MESSAGE: 1
ADD MAN DIFF?: NO
ADD UMIC: YES
ALANINE AMINOTRANSFERASE: 41 IU/L (ref 13–69)
ALBUMIN/GLOBULIN RATIO: 1.2
ALBUMIN: 3.5 G/DL (ref 3.3–4.9)
ALKALINE PHOSPHATASE: 135 IU/L (ref 42–121)
ANION GAP: 10 (ref 8–16)
ASPARTATE AMINO TRANSFERASE: 58 IU/L (ref 15–46)
BASOPHIL #: 0 10^3/UL (ref 0–0.1)
BASOPHILS %: 0.5 % (ref 0–2)
BILIRUBIN,DIRECT: 0 MG/DL (ref 0–0.2)
BILIRUBIN,TOTAL: 2.1 MG/DL (ref 0.2–1.3)
BLOOD UREA NITROGEN: 23 MG/DL (ref 7–20)
CALCIUM: 9.5 MG/DL (ref 8.4–10.2)
CARBON DIOXIDE: 26 MMOL/L (ref 21–31)
CHLORIDE: 103 MMOL/L (ref 97–110)
CREATININE: 0.8 MG/DL (ref 0.44–1)
EOSINOPHILS #: 0.1 10^3/UL (ref 0–0.5)
EOSINOPHILS %: 1.4 % (ref 0–7)
GLOBULIN: 2.9 G/DL (ref 1.3–3.2)
GLUCOSE: 327 MG/DL (ref 70–220)
HEMATOCRIT: 27.2 % (ref 37–47)
HEMOGLOBIN: 9.9 G/DL (ref 12–16)
LACTIC ACID: 1.8 MMOL/L (ref 0.5–2)
LIPASE: 143 U/L (ref 23–300)
LYMPHOCYTES #: 1.1 10^3/UL (ref 0.8–2.9)
LYMPHOCYTES %: 28.6 % (ref 15–51)
MEAN CORPUSCULAR HEMOGLOBIN: 31.5 PG (ref 29–33)
MEAN CORPUSCULAR HGB CONC: 36.4 G/DL (ref 32–37)
MEAN CORPUSCULAR VOLUME: 86.6 FL (ref 82–101)
MEAN PLATELET VOLUME: 11.1 FL (ref 7.4–10.4)
MONOCYTE #: 0.4 10^3/UL (ref 0.3–0.9)
MONOCYTES %: 9.5 % (ref 0–11)
NEUTROPHIL #: 2.2 10^3/UL (ref 1.6–7.5)
NEUTROPHILS %: 59.2 % (ref 39–77)
NUCLEATED RED BLOOD CELLS #: 0 10^3/UL (ref 0–0)
NUCLEATED RED BLOOD CELLS%: 0 /100WBC (ref 0–0)
PLATELET COUNT: 60 10^3/UL (ref 140–415)
POSITIVE DIFF: (no result)
POTASSIUM: 4.1 MMOL/L (ref 3.5–5.1)
RED BLOOD COUNT: 3.14 10^6/UL (ref 4.2–5.4)
RED CELL DISTRIBUTION WIDTH: 15.6 % (ref 11.5–14.5)
SODIUM: 135 MMOL/L (ref 135–144)
TOTAL PROTEIN: 6.4 G/DL (ref 6.1–8.1)
TROPONIN-I: < 0.01 NG/ML (ref 0–0.12)
UR ASCORBIC ACID: NEGATIVE MG/DL
UR BILIRUBIN (DIP): NEGATIVE MG/DL
UR BLOOD (DIP): (no result) MG/DL
UR CLARITY: CLEAR
UR COLOR: YELLOW
UR GLUCOSE (DIP): (no result) MG/DL
UR KETONES (DIP): NEGATIVE MG/DL
UR LEUKOCYTE ESTERASE (DIP): NEGATIVE LEU/UL
UR NITRITE (DIP): NEGATIVE MG/DL
UR PH (DIP): 5 (ref 5–9)
UR RBC: 1 /HPF (ref 0–5)
UR SPECIFIC GRAVITY (DIP): 1.02 (ref 1–1.03)
UR TOTAL PROTEIN (DIP): (no result) MG/DL
UR UROBILINOGEN (DIP): (no result) MG/DL
UR WBC: 1 /HPF (ref 0–5)
WHITE BLOOD COUNT: 3.7 10^3/UL (ref 4.8–10.8)

## 2018-08-05 PROCEDURE — 83735 ASSAY OF MAGNESIUM: CPT

## 2018-08-05 PROCEDURE — 81001 URINALYSIS AUTO W/SCOPE: CPT

## 2018-08-05 PROCEDURE — 71045 X-RAY EXAM CHEST 1 VIEW: CPT

## 2018-08-05 PROCEDURE — 96361 HYDRATE IV INFUSION ADD-ON: CPT

## 2018-08-05 PROCEDURE — 80053 COMPREHEN METABOLIC PANEL: CPT

## 2018-08-05 PROCEDURE — 84484 ASSAY OF TROPONIN QUANT: CPT

## 2018-08-05 PROCEDURE — 76705 ECHO EXAM OF ABDOMEN: CPT

## 2018-08-05 PROCEDURE — 85025 COMPLETE CBC W/AUTO DIFF WBC: CPT

## 2018-08-05 PROCEDURE — 87340 HEPATITIS B SURFACE AG IA: CPT

## 2018-08-05 PROCEDURE — 97161 PT EVAL LOW COMPLEX 20 MIN: CPT

## 2018-08-05 PROCEDURE — 87040 BLOOD CULTURE FOR BACTERIA: CPT

## 2018-08-05 PROCEDURE — 93005 ELECTROCARDIOGRAM TRACING: CPT

## 2018-08-05 PROCEDURE — 86803 HEPATITIS C AB TEST: CPT

## 2018-08-05 PROCEDURE — 83690 ASSAY OF LIPASE: CPT

## 2018-08-05 PROCEDURE — 83036 HEMOGLOBIN GLYCOSYLATED A1C: CPT

## 2018-08-05 PROCEDURE — 86709 HEPATITIS A IGM ANTIBODY: CPT

## 2018-08-05 PROCEDURE — 83605 ASSAY OF LACTIC ACID: CPT

## 2018-08-05 PROCEDURE — 86704 HEP B CORE ANTIBODY TOTAL: CPT

## 2018-08-05 PROCEDURE — 87086 URINE CULTURE/COLONY COUNT: CPT

## 2018-08-05 PROCEDURE — 74176 CT ABD & PELVIS W/O CONTRAST: CPT

## 2018-08-05 PROCEDURE — 36415 COLL VENOUS BLD VENIPUNCTURE: CPT

## 2018-08-05 PROCEDURE — 99285 EMERGENCY DEPT VISIT HI MDM: CPT

## 2018-08-05 PROCEDURE — 78264 GASTRIC EMPTYING IMG STUDY: CPT

## 2018-08-05 PROCEDURE — 82962 GLUCOSE BLOOD TEST: CPT

## 2018-08-05 PROCEDURE — 99217: CPT

## 2018-08-05 PROCEDURE — 96374 THER/PROPH/DIAG INJ IV PUSH: CPT

## 2018-08-05 PROCEDURE — 82140 ASSAY OF AMMONIA: CPT

## 2018-08-06 ENCOUNTER — HOSPITAL ENCOUNTER (EMERGENCY)
Age: 68
LOS: 1 days | Discharge: HOME | End: 2018-08-07

## 2018-08-06 LAB
ABNORMAL IP MESSAGE: 1
ADD MAN DIFF?: NO
ALANINE AMINOTRANSFERASE: 36 IU/L (ref 13–69)
ALBUMIN/GLOBULIN RATIO: 1
ALBUMIN: 3 G/DL (ref 3.3–4.9)
ALKALINE PHOSPHATASE: 112 IU/L (ref 42–121)
AMMONIA: 60 UMOL/L (ref 9–30)
ANION GAP: 9 (ref 8–16)
ASPARTATE AMINO TRANSFERASE: 51 IU/L (ref 15–46)
BASOPHIL #: 0 10^3/UL (ref 0–0.1)
BASOPHILS %: 0.7 % (ref 0–2)
BILIRUBIN,DIRECT: 0 MG/DL (ref 0–0.2)
BILIRUBIN,TOTAL: 2.5 MG/DL (ref 0.2–1.3)
BLOOD UREA NITROGEN: 20 MG/DL (ref 7–20)
CALCIUM: 9.1 MG/DL (ref 8.4–10.2)
CARBON DIOXIDE: 26 MMOL/L (ref 21–31)
CHLORIDE: 108 MMOL/L (ref 97–110)
CREATININE: 0.76 MG/DL (ref 0.44–1)
EOSINOPHILS #: 0.1 10^3/UL (ref 0–0.5)
EOSINOPHILS %: 1.9 % (ref 0–7)
GLOBULIN: 3 G/DL (ref 1.3–3.2)
GLUCOSE: 297 MG/DL (ref 70–220)
HAAIG REFLEX: (no result)
HEMATOCRIT: 25.4 % (ref 37–47)
HEMOGLOBIN A1C: 8.1 % (ref 0–5.9)
HEMOGLOBIN: 9 G/DL (ref 12–16)
HEPATITIS B CORE ANTIBODY: NEGATIVE
HEPATITIS B SURFACE ANTIGEN: NEGATIVE
HEPATITIS C VIRAL ANTIBODY: NEGATIVE
LYMPHOCYTES #: 1 10^3/UL (ref 0.8–2.9)
LYMPHOCYTES %: 36.6 % (ref 15–51)
MAGNESIUM: 2.3 MG/DL (ref 1.7–2.5)
MEAN CORPUSCULAR HEMOGLOBIN: 31.1 PG (ref 29–33)
MEAN CORPUSCULAR HGB CONC: 35.4 G/DL (ref 32–37)
MEAN CORPUSCULAR VOLUME: 87.9 FL (ref 82–101)
MEAN PLATELET VOLUME: 10.8 FL (ref 7.4–10.4)
MONOCYTE #: 0.3 10^3/UL (ref 0.3–0.9)
MONOCYTES %: 10.1 % (ref 0–11)
NEUTROPHIL #: 1.4 10^3/UL (ref 1.6–7.5)
NEUTROPHILS %: 50.3 % (ref 39–77)
NUCLEATED RED BLOOD CELLS #: 0 10^3/UL (ref 0–0)
NUCLEATED RED BLOOD CELLS%: 0 /100WBC (ref 0–0)
PLATELET COUNT: 44 10^3/UL (ref 140–415)
POSITIVE DIFF: (no result)
POTASSIUM: 4.1 MMOL/L (ref 3.5–5.1)
RED BLOOD COUNT: 2.89 10^6/UL (ref 4.2–5.4)
RED CELL DISTRIBUTION WIDTH: 15.6 % (ref 11.5–14.5)
SODIUM: 139 MMOL/L (ref 135–144)
TOTAL PROTEIN: 6 G/DL (ref 6.1–8.1)
WHITE BLOOD COUNT: 2.7 10^3/UL (ref 4.8–10.8)

## 2018-08-06 RX ADMIN — MAGNESIUM SULFATE HEPTAHYDRATE 1 MLS/HR: 1 INJECTION, SOLUTION INTRAVENOUS at 02:31

## 2018-08-06 RX ADMIN — CALCIUM 1 GM: 500 TABLET ORAL at 20:50

## 2018-08-06 RX ADMIN — THIAMINE HYDROCHLORIDE 1 MLS/HR: 100 INJECTION, SOLUTION INTRAMUSCULAR; INTRAVENOUS at 17:39

## 2018-08-06 RX ADMIN — INSULIN DETEMIR 1 UNITS: 100 INJECTION, SOLUTION SUBCUTANEOUS at 20:47

## 2018-08-06 RX ADMIN — FERROUS SULFATE TAB 325 MG (65 MG ELEMENTAL FE) 1 MG: 325 (65 FE) TAB at 08:36

## 2018-08-06 RX ADMIN — METOCLOPRAMIDE HYDROCHLORIDE 1 MG: 5 TABLET ORAL at 12:29

## 2018-08-06 RX ADMIN — GABAPENTIN 1 MG: 100 CAPSULE ORAL at 12:29

## 2018-08-06 RX ADMIN — FERROUS SULFATE TAB 325 MG (65 MG ELEMENTAL FE) 1 MG: 325 (65 FE) TAB at 20:50

## 2018-08-06 RX ADMIN — GABAPENTIN 1 MG: 100 CAPSULE ORAL at 08:34

## 2018-08-06 RX ADMIN — BISACODYL 1 MG: 5 TABLET, COATED ORAL at 05:39

## 2018-08-06 RX ADMIN — CALCIUM 1 GM: 500 TABLET ORAL at 08:34

## 2018-08-06 RX ADMIN — ISOSORBIDE MONONITRATE 1 MG: 20 TABLET ORAL at 08:35

## 2018-08-06 RX ADMIN — LACTULOSE 1 GM: 20 SOLUTION ORAL at 08:35

## 2018-08-06 RX ADMIN — ONDANSETRON HYDROCHLORIDE 1 MG: 2 INJECTION, SOLUTION INTRAMUSCULAR; INTRAVENOUS at 00:02

## 2018-08-06 RX ADMIN — PANTOPRAZOLE SODIUM 1 MG: 40 INJECTION, POWDER, FOR SOLUTION INTRAVENOUS at 02:01

## 2018-08-06 RX ADMIN — INSULIN ASPART 1 UNIT: 100 INJECTION, SOLUTION INTRAVENOUS; SUBCUTANEOUS at 20:46

## 2018-08-06 RX ADMIN — LACTULOSE 1 GM: 20 SOLUTION ORAL at 12:29

## 2018-08-06 RX ADMIN — LIDOCAINE HYDROCHLORIDE 1 MLS/HR: 10 INJECTION, SOLUTION EPIDURAL; INFILTRATION; INTRACAUDAL; PERINEURAL at 00:03

## 2018-08-06 RX ADMIN — LACTULOSE 1 GM: 20 SOLUTION ORAL at 20:49

## 2018-08-06 RX ADMIN — RIFAXIMIN 1 MG: 550 TABLET ORAL at 08:34

## 2018-08-06 RX ADMIN — INSULIN ASPART 1 UNIT: 100 INJECTION, SOLUTION INTRAVENOUS; SUBCUTANEOUS at 08:32

## 2018-08-06 RX ADMIN — THIAMINE HYDROCHLORIDE 1 MLS/HR: 100 INJECTION, SOLUTION INTRAMUSCULAR; INTRAVENOUS at 02:02

## 2018-08-06 RX ADMIN — RIFAXIMIN 1 MG: 550 TABLET ORAL at 20:49

## 2018-08-06 RX ADMIN — INSULIN ASPART 1 UNIT: 100 INJECTION, SOLUTION INTRAVENOUS; SUBCUTANEOUS at 12:31

## 2018-08-06 RX ADMIN — PANTOPRAZOLE SODIUM 1 MG: 40 INJECTION, POWDER, FOR SOLUTION INTRAVENOUS at 17:39

## 2018-08-06 RX ADMIN — PANTOPRAZOLE SODIUM 1 MG: 40 TABLET, DELAYED RELEASE ORAL at 05:39

## 2018-08-06 RX ADMIN — ISOSORBIDE MONONITRATE 1 MG: 20 TABLET ORAL at 20:50

## 2018-08-06 RX ADMIN — METOCLOPRAMIDE HYDROCHLORIDE 1 MG: 5 TABLET ORAL at 20:49

## 2018-08-06 RX ADMIN — SODIUM PHOSPHATE, DIBASIC AND SODIUM PHOSPHATE, MONOBASIC 1 ML: 7; 19 ENEMA RECTAL at 05:30

## 2018-08-06 RX ADMIN — ALUMINUM HYDROXIDE, MAGNESIUM HYDROXIDE, DIMETHICONE 1 ML: 200; 200; 20 SUSPENSION ORAL at 02:01

## 2018-08-06 RX ADMIN — MECLIZINE HYDROCHLORIDE 1 MG: 25 TABLET ORAL at 08:34

## 2018-08-06 RX ADMIN — METOCLOPRAMIDE HYDROCHLORIDE 1 MG: 5 TABLET ORAL at 17:39

## 2018-08-06 RX ADMIN — GABAPENTIN 1 MG: 100 CAPSULE ORAL at 20:49

## 2018-08-06 RX ADMIN — INSULIN ASPART 1 UNIT: 100 INJECTION, SOLUTION INTRAVENOUS; SUBCUTANEOUS at 17:46

## 2018-08-07 LAB
ABNORMAL IP MESSAGE: 1
ADD MAN DIFF?: NO
ALANINE AMINOTRANSFERASE: 31 IU/L (ref 13–69)
ALBUMIN/GLOBULIN RATIO: 1
ALBUMIN: 2.8 G/DL (ref 3.3–4.9)
ALKALINE PHOSPHATASE: 92 IU/L (ref 42–121)
ANION GAP: 9 (ref 8–16)
ASPARTATE AMINO TRANSFERASE: 53 IU/L (ref 15–46)
BASOPHIL #: 0 10^3/UL (ref 0–0.1)
BASOPHILS %: 0.8 % (ref 0–2)
BILIRUBIN,DIRECT: 0 MG/DL (ref 0–0.2)
BILIRUBIN,TOTAL: 1.9 MG/DL (ref 0.2–1.3)
BLOOD UREA NITROGEN: 15 MG/DL (ref 7–20)
CALCIUM: 8.8 MG/DL (ref 8.4–10.2)
CARBON DIOXIDE: 25 MMOL/L (ref 21–31)
CHLORIDE: 110 MMOL/L (ref 97–110)
CREATININE: 0.62 MG/DL (ref 0.44–1)
EOSINOPHILS #: 0.1 10^3/UL (ref 0–0.5)
EOSINOPHILS %: 2.3 % (ref 0–7)
GLOBULIN: 2.8 G/DL (ref 1.3–3.2)
GLUCOSE: 103 MG/DL (ref 70–220)
HEMATOCRIT: 25.3 % (ref 37–47)
HEMOGLOBIN A1C: 8 % (ref 0–5.9)
HEMOGLOBIN: 9 G/DL (ref 12–16)
LYMPHOCYTES #: 0.9 10^3/UL (ref 0.8–2.9)
LYMPHOCYTES %: 22.8 % (ref 15–51)
MAGNESIUM: 1.8 MG/DL (ref 1.7–2.5)
MEAN CORPUSCULAR HEMOGLOBIN: 31.9 PG (ref 29–33)
MEAN CORPUSCULAR HGB CONC: 35.6 G/DL (ref 32–37)
MEAN CORPUSCULAR VOLUME: 89.7 FL (ref 82–101)
MEAN PLATELET VOLUME: 10.5 FL (ref 7.4–10.4)
MONOCYTE #: 0.4 10^3/UL (ref 0.3–0.9)
MONOCYTES %: 10.9 % (ref 0–11)
NEUTROPHIL #: 2.5 10^3/UL (ref 1.6–7.5)
NEUTROPHILS %: 62.7 % (ref 39–77)
NUCLEATED RED BLOOD CELLS #: 0 10^3/UL (ref 0–0)
NUCLEATED RED BLOOD CELLS%: 0 /100WBC (ref 0–0)
PLATELET COUNT: 52 10^3/UL (ref 140–415)
POSITIVE DIFF: (no result)
POTASSIUM: 4.2 MMOL/L (ref 3.5–5.1)
RED BLOOD COUNT: 2.82 10^6/UL (ref 4.2–5.4)
RED CELL DISTRIBUTION WIDTH: 15.9 % (ref 11.5–14.5)
SODIUM: 140 MMOL/L (ref 135–144)
TOTAL PROTEIN: 5.6 G/DL (ref 6.1–8.1)
WHITE BLOOD COUNT: 3.9 10^3/UL (ref 4.8–10.8)

## 2018-08-07 RX ADMIN — GABAPENTIN 1 MG: 100 CAPSULE ORAL at 08:59

## 2018-08-07 RX ADMIN — FERROUS SULFATE TAB 325 MG (65 MG ELEMENTAL FE) 1 MG: 325 (65 FE) TAB at 08:59

## 2018-08-07 RX ADMIN — CALCIUM 1 GM: 500 TABLET ORAL at 08:59

## 2018-08-07 RX ADMIN — INSULIN ASPART 1 UNIT: 100 INJECTION, SOLUTION INTRAVENOUS; SUBCUTANEOUS at 12:51

## 2018-08-07 RX ADMIN — INSULIN ASPART 1 UNIT: 100 INJECTION, SOLUTION INTRAVENOUS; SUBCUTANEOUS at 09:02

## 2018-08-07 RX ADMIN — PANTOPRAZOLE SODIUM 1 MG: 40 TABLET, DELAYED RELEASE ORAL at 05:59

## 2018-08-07 RX ADMIN — INSULIN ASPART 1 UNIT: 100 INJECTION, SOLUTION INTRAVENOUS; SUBCUTANEOUS at 08:00

## 2018-08-07 RX ADMIN — GABAPENTIN 1 MG: 100 CAPSULE ORAL at 12:51

## 2018-08-07 RX ADMIN — MECLIZINE HYDROCHLORIDE 1 MG: 25 TABLET ORAL at 08:59

## 2018-08-07 RX ADMIN — LACTULOSE 1 GM: 20 SOLUTION ORAL at 12:51

## 2018-08-07 RX ADMIN — METOCLOPRAMIDE HYDROCHLORIDE 1 MG: 5 TABLET ORAL at 12:51

## 2018-08-07 RX ADMIN — RIFAXIMIN 1 MG: 550 TABLET ORAL at 08:59

## 2018-08-07 RX ADMIN — THIAMINE HYDROCHLORIDE 1 MLS/HR: 100 INJECTION, SOLUTION INTRAMUSCULAR; INTRAVENOUS at 03:40

## 2018-08-07 RX ADMIN — METOCLOPRAMIDE HYDROCHLORIDE 1 MG: 5 TABLET ORAL at 08:59

## 2018-08-07 RX ADMIN — LACTULOSE 1 GM: 20 SOLUTION ORAL at 09:00

## 2018-08-07 RX ADMIN — INSULIN ASPART 1 UNIT: 100 INJECTION, SOLUTION INTRAVENOUS; SUBCUTANEOUS at 12:50

## 2018-08-07 RX ADMIN — ISOSORBIDE MONONITRATE 1 MG: 20 TABLET ORAL at 09:00

## 2018-11-13 ENCOUNTER — HOSPITAL ENCOUNTER (INPATIENT)
Age: 68
LOS: 4 days | Discharge: HOME HEALTH | DRG: 441 | End: 2018-11-17

## 2018-11-13 ENCOUNTER — HOSPITAL ENCOUNTER (INPATIENT)
Dept: HOSPITAL 91 - 6WM | Age: 68
LOS: 4 days | Discharge: HOME HEALTH | DRG: 441 | End: 2018-11-17
Payer: MEDICARE

## 2018-11-13 DIAGNOSIS — K74.69: ICD-10-CM

## 2018-11-13 DIAGNOSIS — G93.41: ICD-10-CM

## 2018-11-13 DIAGNOSIS — E83.42: ICD-10-CM

## 2018-11-13 DIAGNOSIS — D61.818: ICD-10-CM

## 2018-11-13 DIAGNOSIS — K31.9: ICD-10-CM

## 2018-11-13 DIAGNOSIS — E11.649: ICD-10-CM

## 2018-11-13 DIAGNOSIS — K72.00: Primary | ICD-10-CM

## 2018-11-13 DIAGNOSIS — E87.6: ICD-10-CM

## 2018-11-13 LAB
ABNORMAL IP MESSAGE: 1
ADD MAN DIFF?: YES
ALANINE AMINOTRANSFERASE: 43 IU/L (ref 13–69)
ALBUMIN/GLOBULIN RATIO: 1.15
ALBUMIN: 3.8 G/DL (ref 3.3–4.9)
ALKALINE PHOSPHATASE: 120 IU/L (ref 42–121)
AMMONIA: 159 UMOL/L (ref 9–30)
ANION GAP: 10 (ref 5–13)
ASPARTATE AMINO TRANSFERASE: 65 IU/L (ref 15–46)
BAND NEUTROPHILS #M: 0.1 10^3/UL (ref 0–0.6)
BAND NEUTROPHILS % (M): 2 % (ref 0–4)
BILIRUBIN,DIRECT: 0 MG/DL (ref 0–0.2)
BILIRUBIN,TOTAL: 2 MG/DL (ref 0.2–1.3)
BLOOD UREA NITROGEN: 17 MG/DL (ref 7–20)
CALCIUM: 10.3 MG/DL (ref 8.4–10.2)
CARBON DIOXIDE: 23 MMOL/L (ref 21–31)
CHLORIDE: 110 MMOL/L (ref 97–110)
CREATININE: 0.63 MG/DL (ref 0.44–1)
EOSINOPHILS % (M): 5 % (ref 0–7)
GLOBULIN: 3.3 G/DL (ref 1.3–3.2)
GLUCOSE: 44 MG/DL (ref 70–220)
HEMATOCRIT: 31 % (ref 37–47)
HEMOGLOBIN: 11 G/DL (ref 12–16)
IMMATURE GRANS #M: 0.05 10^3/UL (ref 0–0.03)
IMMATURE GRANS % (M): 0.9 % (ref 0–0.43)
INR: 1.32
LIPASE: 133 U/L (ref 23–300)
LYMPHOCYTES #M: 0.9 10^3/UL (ref 0.8–2.9)
LYMPHOCYTES % (M): 17 % (ref 15–51)
MEAN CORPUSCULAR HEMOGLOBIN: 30.8 PG (ref 29–33)
MEAN CORPUSCULAR HGB CONC: 35.5 G/DL (ref 32–37)
MEAN CORPUSCULAR VOLUME: 86.8 FL (ref 82–101)
MEAN PLATELET VOLUME: 10.2 FL (ref 7.4–10.4)
MONOCYTE #M: 0.2 10^3/UL (ref 0.3–0.9)
MONOCYTES % (M): 5 % (ref 0–11)
NUCLEATED RED BLOOD CELLS%: 0 /100WBC (ref 0–0)
PARTIAL THROMBOPLASTIN TIME: 30.3 SEC (ref 23–35)
PLATELET COUNT: 80 10^3/UL (ref 140–415)
PLATELET ESTIMATE: (no result)
POSITIVE DIFF: (no result)
POTASSIUM: 3.1 MMOL/L (ref 3.5–5.1)
PROTIME: 16.6 SEC (ref 11.9–14.9)
PT RATIO: 1.3
REACTIVE LYMPHOCYTES #M: 0.4 10^3/UL (ref 0–0)
REACTIVE LYMPHOCYTES% (M): 9 % (ref 0–0)
RED BLOOD COUNT: 3.57 10^6/UL (ref 4.2–5.4)
RED CELL DISTRIBUTION WIDTH: 14.8 % (ref 11.5–14.5)
SEG NEUT #M: 3.4 10^3/UL (ref 1.6–7.5)
SEGMENTED NEUTROPHILS (M) %: 62 % (ref 39–77)
SMUDGE%M: 4 % (ref 0–0)
SODIUM: 143 MMOL/L (ref 135–144)
TOTAL PROTEIN: 7.1 G/DL (ref 6.1–8.1)
WHITE BLOOD COUNT: 5.4 10^3/UL (ref 4.8–10.8)

## 2018-11-13 PROCEDURE — 76705 ECHO EXAM OF ABDOMEN: CPT

## 2018-11-13 PROCEDURE — 83036 HEMOGLOBIN GLYCOSYLATED A1C: CPT

## 2018-11-13 PROCEDURE — 82962 GLUCOSE BLOOD TEST: CPT

## 2018-11-13 PROCEDURE — 80061 LIPID PANEL: CPT

## 2018-11-13 PROCEDURE — 85730 THROMBOPLASTIN TIME PARTIAL: CPT

## 2018-11-13 PROCEDURE — 99291 CRITICAL CARE FIRST HOUR: CPT

## 2018-11-13 PROCEDURE — 74176 CT ABD & PELVIS W/O CONTRAST: CPT

## 2018-11-13 PROCEDURE — 84443 ASSAY THYROID STIM HORMONE: CPT

## 2018-11-13 PROCEDURE — 86900 BLOOD TYPING SEROLOGIC ABO: CPT

## 2018-11-13 PROCEDURE — 86901 BLOOD TYPING SEROLOGIC RH(D): CPT

## 2018-11-13 PROCEDURE — 70450 CT HEAD/BRAIN W/O DYE: CPT

## 2018-11-13 PROCEDURE — 80076 HEPATIC FUNCTION PANEL: CPT

## 2018-11-13 PROCEDURE — 85610 PROTHROMBIN TIME: CPT

## 2018-11-13 PROCEDURE — 83690 ASSAY OF LIPASE: CPT

## 2018-11-13 PROCEDURE — 83735 ASSAY OF MAGNESIUM: CPT

## 2018-11-13 PROCEDURE — 85025 COMPLETE CBC W/AUTO DIFF WBC: CPT

## 2018-11-13 PROCEDURE — 36430 TRANSFUSION BLD/BLD COMPNT: CPT

## 2018-11-13 PROCEDURE — 71045 X-RAY EXAM CHEST 1 VIEW: CPT

## 2018-11-13 PROCEDURE — 97161 PT EVAL LOW COMPLEX 20 MIN: CPT

## 2018-11-13 PROCEDURE — 86644 CMV ANTIBODY: CPT

## 2018-11-13 PROCEDURE — 80053 COMPREHEN METABOLIC PANEL: CPT

## 2018-11-13 PROCEDURE — 86850 RBC ANTIBODY SCREEN: CPT

## 2018-11-13 PROCEDURE — 80048 BASIC METABOLIC PNL TOTAL CA: CPT

## 2018-11-13 PROCEDURE — 82140 ASSAY OF AMMONIA: CPT

## 2018-11-13 PROCEDURE — 36415 COLL VENOUS BLD VENIPUNCTURE: CPT

## 2018-11-13 RX ADMIN — LACTULOSE 1 ML: 10 SOLUTION ORAL; RECTAL at 21:00

## 2018-11-13 RX ADMIN — POTASSIUM CHLORIDE 1 MEQ: 1500 TABLET, EXTENDED RELEASE ORAL at 19:52

## 2018-11-13 RX ADMIN — INSULIN ASPART 1 UNIT: 100 INJECTION, SOLUTION INTRAVENOUS; SUBCUTANEOUS at 21:00

## 2018-11-13 RX ADMIN — DEXTROSE MONOHYDRATE 1 ML: 500 INJECTION PARENTERAL at 18:45

## 2018-11-13 RX ADMIN — LACTULOSE 1 GM: 20 SOLUTION ORAL at 19:52

## 2018-11-14 LAB
ABNORMAL IP MESSAGE: 1
ADD MAN DIFF?: NO
ALANINE AMINOTRANSFERASE: 43 IU/L (ref 13–69)
ALBUMIN/GLOBULIN RATIO: 1.43
ALBUMIN: 3.3 G/DL (ref 3.3–4.9)
ALKALINE PHOSPHATASE: 100 IU/L (ref 42–121)
ANION GAP: 9 (ref 5–13)
ASPARTATE AMINO TRANSFERASE: 58 IU/L (ref 15–46)
BASOPHIL #: 0 10^3/UL (ref 0–0.1)
BASOPHILS %: 1.1 % (ref 0–2)
BILIRUBIN,DIRECT: 0 MG/DL (ref 0–0.2)
BILIRUBIN,TOTAL: 1.9 MG/DL (ref 0.2–1.3)
BLOOD UREA NITROGEN: 14 MG/DL (ref 7–20)
CALCIUM: 9.8 MG/DL (ref 8.4–10.2)
CARBON DIOXIDE: 23 MMOL/L (ref 21–31)
CHLORIDE: 112 MMOL/L (ref 97–110)
CHOL/HDL RATIO: 3.3 RATIO
CHOLESTEROL: 130 MG/DL (ref 100–200)
CREATININE: 0.55 MG/DL (ref 0.44–1)
EOSINOPHILS #: 0.1 10^3/UL (ref 0–0.5)
EOSINOPHILS %: 3.3 % (ref 0–7)
GLOBULIN: 2.3 G/DL (ref 1.3–3.2)
GLUCOSE: 111 MG/DL (ref 70–220)
HDL CHOLESTEROL: 39 MG/DL (ref 35–98)
HEMATOCRIT: 24.9 % (ref 37–47)
HEMOGLOBIN A1C: 5.4 % (ref 0–5.9)
HEMOGLOBIN: 8.8 G/DL (ref 12–16)
IMMATURE GRANS #M: 0.01 10^3/UL (ref 0–0.03)
IMMATURE GRANS % (M): 0.4 % (ref 0–0.43)
LDL CHOLESTEROL,CALCULATED: 73 MG/DL
LYMPHOCYTES #: 1.1 10^3/UL (ref 0.8–2.9)
LYMPHOCYTES %: 40.7 % (ref 15–51)
MAGNESIUM: 1.2 MG/DL (ref 1.7–2.5)
MEAN CORPUSCULAR HEMOGLOBIN: 31 PG (ref 29–33)
MEAN CORPUSCULAR HGB CONC: 35.3 G/DL (ref 32–37)
MEAN CORPUSCULAR VOLUME: 87.7 FL (ref 82–101)
MEAN PLATELET VOLUME: 10.6 FL (ref 7.4–10.4)
MONOCYTE #: 0.3 10^3/UL (ref 0.3–0.9)
MONOCYTES %: 9.2 % (ref 0–11)
NEUTROPHIL #: 1.2 10^3/UL (ref 1.6–7.5)
NEUTROPHILS %: 45.3 % (ref 39–77)
NUCLEATED RED BLOOD CELLS #: 0 10^3/UL (ref 0–0)
NUCLEATED RED BLOOD CELLS%: 0 /100WBC (ref 0–0)
PLATELET COUNT: 48 10^3/UL (ref 140–415)
POSITIVE DIFF: (no result)
POTASSIUM: 4 MMOL/L (ref 3.5–5.1)
RED BLOOD COUNT: 2.84 10^6/UL (ref 4.2–5.4)
RED CELL DISTRIBUTION WIDTH: 15 % (ref 11.5–14.5)
SODIUM: 144 MMOL/L (ref 135–144)
THYROID STIMULATING HORMONE: 1.89 MIU/L (ref 0.47–4.68)
TOTAL PROTEIN: 5.6 G/DL (ref 6.1–8.1)
TRIGLYCERIDES: 91 MG/DL (ref 0–149)
TYPE AND SCREEN: 1 1
WHITE BLOOD COUNT: 2.7 10^3/UL (ref 4.8–10.8)

## 2018-11-14 RX ADMIN — FUROSEMIDE 1 MG: 20 TABLET ORAL at 08:45

## 2018-11-14 RX ADMIN — INSULIN ASPART 1 UNIT: 100 INJECTION, SOLUTION INTRAVENOUS; SUBCUTANEOUS at 12:44

## 2018-11-14 RX ADMIN — INSULIN ASPART 1 UNIT: 100 INJECTION, SOLUTION INTRAVENOUS; SUBCUTANEOUS at 01:00

## 2018-11-14 RX ADMIN — INSULIN ASPART 1 UNIT: 100 INJECTION, SOLUTION INTRAVENOUS; SUBCUTANEOUS at 20:46

## 2018-11-14 RX ADMIN — LACTULOSE 1 GM: 20 SOLUTION ORAL at 05:44

## 2018-11-14 RX ADMIN — LACTULOSE 1 GM: 20 SOLUTION ORAL at 23:56

## 2018-11-14 RX ADMIN — INSULIN ASPART 1 UNIT: 100 INJECTION, SOLUTION INTRAVENOUS; SUBCUTANEOUS at 10:07

## 2018-11-14 RX ADMIN — ACETAMINOPHEN 1 MG: 650 SUPPOSITORY RECTAL at 04:53

## 2018-11-14 RX ADMIN — LACTULOSE 1 GM: 20 SOLUTION ORAL at 12:38

## 2018-11-14 RX ADMIN — ASCORBIC ACID, VITAMIN A PALMITATE, CHOLECALCIFEROL, THIAMINE HYDROCHLORIDE, RIBOFLAVIN-5 PHOSPHATE SODIUM, PYRIDOXINE HYDROCHLORIDE, NIACINAMIDE, DEXPANTHENOL, ALPHA-TOCOPHEROL ACETATE, VITAMIN K1, FOLIC ACID, BIOTIN, CYANOCOBALAMIN 1 MLS/HR: 200; 3300; 200; 6; 3.6; 6; 40; 15; 10; 150; 600; 60; 5 INJECTION, SOLUTION INTRAVENOUS at 04:43

## 2018-11-14 RX ADMIN — RIFAXIMIN 1 MG: 550 TABLET ORAL at 01:30

## 2018-11-14 RX ADMIN — LACTULOSE 1 GM: 20 SOLUTION ORAL at 17:30

## 2018-11-14 RX ADMIN — PANTOPRAZOLE SODIUM 1 MG: 40 TABLET, DELAYED RELEASE ORAL at 05:44

## 2018-11-14 RX ADMIN — RIFAXIMIN 1 MG: 550 TABLET ORAL at 10:02

## 2018-11-14 RX ADMIN — MAGNESIUM SULFATE HEPTAHYDRATE 1 MLS/HR: 40 INJECTION, SOLUTION INTRAVENOUS at 15:27

## 2018-11-14 RX ADMIN — LIDOCAINE HYDROCHLORIDE 1 MLS/HR: 10 INJECTION, SOLUTION EPIDURAL; INFILTRATION; INTRACAUDAL; PERINEURAL at 15:43

## 2018-11-14 RX ADMIN — INSULIN ASPART 1 UNIT: 100 INJECTION, SOLUTION INTRAVENOUS; SUBCUTANEOUS at 17:30

## 2018-11-14 RX ADMIN — RIFAXIMIN 1 MG: 550 TABLET ORAL at 21:45

## 2018-11-14 RX ADMIN — INSULIN ASPART 1 UNIT: 100 INJECTION, SOLUTION INTRAVENOUS; SUBCUTANEOUS at 04:45

## 2018-11-15 LAB
ABNORMAL IP MESSAGE: 1
ADD MAN DIFF?: NO
ALANINE AMINOTRANSFERASE: 46 IU/L (ref 13–69)
ALBUMIN/GLOBULIN RATIO: 1.25
ALBUMIN: 3 G/DL (ref 3.3–4.9)
ALKALINE PHOSPHATASE: 100 IU/L (ref 42–121)
AMMONIA: 131 UMOL/L (ref 9–30)
ANION GAP: 8 (ref 5–13)
ASPARTATE AMINO TRANSFERASE: 66 IU/L (ref 15–46)
BASOPHIL #: 0 10^3/UL (ref 0–0.1)
BASOPHILS %: 1 % (ref 0–2)
BILIRUBIN,DIRECT: 0 MG/DL (ref 0–0.2)
BILIRUBIN,TOTAL: 1.8 MG/DL (ref 0.2–1.3)
BLOOD UREA NITROGEN: 10 MG/DL (ref 7–20)
CALCIUM: 9.5 MG/DL (ref 8.4–10.2)
CARBON DIOXIDE: 26 MMOL/L (ref 21–31)
CHLORIDE: 110 MMOL/L (ref 97–110)
CREATININE: 0.48 MG/DL (ref 0.44–1)
EOSINOPHILS #: 0.1 10^3/UL (ref 0–0.5)
EOSINOPHILS %: 3.2 % (ref 0–7)
GLOBULIN: 2.4 G/DL (ref 1.3–3.2)
GLUCOSE: 166 MG/DL (ref 70–220)
HEMATOCRIT: 25.1 % (ref 37–47)
HEMOGLOBIN: 8.9 G/DL (ref 12–16)
IMMATURE GRANS #M: 0 10^3/UL (ref 0–0.03)
IMMATURE GRANS % (M): 0 % (ref 0–0.43)
LYMPHOCYTES #: 1 10^3/UL (ref 0.8–2.9)
LYMPHOCYTES %: 32.5 % (ref 15–51)
MAGNESIUM: 1.5 MG/DL (ref 1.7–2.5)
MEAN CORPUSCULAR HEMOGLOBIN: 31.4 PG (ref 29–33)
MEAN CORPUSCULAR HGB CONC: 35.5 G/DL (ref 32–37)
MEAN CORPUSCULAR VOLUME: 88.7 FL (ref 82–101)
MEAN PLATELET VOLUME: 10.5 FL (ref 7.4–10.4)
MONOCYTE #: 0.3 10^3/UL (ref 0.3–0.9)
MONOCYTES %: 9.3 % (ref 0–11)
NEUTROPHIL #: 1.7 10^3/UL (ref 1.6–7.5)
NEUTROPHILS %: 54 % (ref 39–77)
NUCLEATED RED BLOOD CELLS #: 0 10^3/UL (ref 0–0)
NUCLEATED RED BLOOD CELLS%: 0 /100WBC (ref 0–0)
PLATELET COUNT: 71 10^3/UL (ref 140–415)
POSITIVE DIFF: (no result)
POTASSIUM: 3.9 MMOL/L (ref 3.5–5.1)
RED BLOOD COUNT: 2.83 10^6/UL (ref 4.2–5.4)
RED CELL DISTRIBUTION WIDTH: 15.2 % (ref 11.5–14.5)
SODIUM: 144 MMOL/L (ref 135–144)
TOTAL PROTEIN: 5.4 G/DL (ref 6.1–8.1)
WHITE BLOOD COUNT: 3.1 10^3/UL (ref 4.8–10.8)

## 2018-11-15 RX ADMIN — INSULIN ASPART 1 UNIT: 100 INJECTION, SOLUTION INTRAVENOUS; SUBCUTANEOUS at 17:09

## 2018-11-15 RX ADMIN — RIFAXIMIN 1 MG: 550 TABLET ORAL at 20:09

## 2018-11-15 RX ADMIN — LACTULOSE 1 GM: 20 SOLUTION ORAL at 12:02

## 2018-11-15 RX ADMIN — RIFAXIMIN 1 MG: 550 TABLET ORAL at 08:36

## 2018-11-15 RX ADMIN — INSULIN ASPART 1 UNIT: 100 INJECTION, SOLUTION INTRAVENOUS; SUBCUTANEOUS at 08:07

## 2018-11-15 RX ADMIN — INSULIN ASPART 1 UNIT: 100 INJECTION, SOLUTION INTRAVENOUS; SUBCUTANEOUS at 20:37

## 2018-11-15 RX ADMIN — INSULIN ASPART 1 UNIT: 100 INJECTION, SOLUTION INTRAVENOUS; SUBCUTANEOUS at 04:56

## 2018-11-15 RX ADMIN — MAGNESIUM SULFATE HEPTAHYDRATE 1 MLS/HR: 40 INJECTION, SOLUTION INTRAVENOUS at 13:17

## 2018-11-15 RX ADMIN — INSULIN ASPART 1 UNIT: 100 INJECTION, SOLUTION INTRAVENOUS; SUBCUTANEOUS at 12:09

## 2018-11-15 RX ADMIN — FUROSEMIDE 1 MG: 20 TABLET ORAL at 08:36

## 2018-11-15 RX ADMIN — MAGESIUM CITRATE 1 ML: 1.75 LIQUID ORAL at 17:43

## 2018-11-15 RX ADMIN — INSULIN ASPART 1 UNIT: 100 INJECTION, SOLUTION INTRAVENOUS; SUBCUTANEOUS at 00:13

## 2018-11-15 RX ADMIN — LACTULOSE 1 GM: 20 SOLUTION ORAL at 05:59

## 2018-11-15 RX ADMIN — LACTULOSE 1 GM: 20 SOLUTION ORAL at 17:10

## 2018-11-15 RX ADMIN — PANTOPRAZOLE SODIUM 1 MG: 40 TABLET, DELAYED RELEASE ORAL at 05:59

## 2018-11-15 RX ADMIN — POLYETHYLENE GLYCOL 3350 1 GM: 17 POWDER, FOR SOLUTION ORAL at 17:39

## 2018-11-15 RX ADMIN — BISACODYL 1 MG: 5 TABLET, COATED ORAL at 16:21

## 2018-11-15 RX ADMIN — INSULIN GLARGINE 1 UNITS: 100 INJECTION, SOLUTION SUBCUTANEOUS at 20:37

## 2018-11-16 LAB
ABNORMAL IP MESSAGE: 1
ADD MAN DIFF?: NO
ALANINE AMINOTRANSFERASE: 53 IU/L (ref 13–69)
ALBUMIN: 2.8 G/DL (ref 3.3–4.9)
ALKALINE PHOSPHATASE: 105 IU/L (ref 42–121)
ANION GAP: 6 (ref 5–13)
ASPARTATE AMINO TRANSFERASE: 77 IU/L (ref 15–46)
BASOPHIL #: 0 10^3/UL (ref 0–0.1)
BASOPHILS %: 0.9 % (ref 0–2)
BILIRUBIN,DIRECT: 0 MG/DL (ref 0–0.2)
BILIRUBIN,TOTAL: 2.3 MG/DL (ref 0.2–1.3)
BLOOD UREA NITROGEN: 8 MG/DL (ref 7–20)
CALCIUM: 9.2 MG/DL (ref 8.4–10.2)
CARBON DIOXIDE: 25 MMOL/L (ref 21–31)
CHLORIDE: 112 MMOL/L (ref 97–110)
CREATININE: 0.47 MG/DL (ref 0.44–1)
EOSINOPHILS #: 0.1 10^3/UL (ref 0–0.5)
EOSINOPHILS %: 3.8 % (ref 0–7)
GLUCOSE: 122 MG/DL (ref 70–220)
HEMATOCRIT: 24.6 % (ref 37–47)
HEMOGLOBIN: 8.7 G/DL (ref 12–16)
IMMATURE GRANS #M: 0.01 10^3/UL (ref 0–0.03)
IMMATURE GRANS % (M): 0.3 % (ref 0–0.43)
INR: 1.5
LYMPHOCYTES #: 1 10^3/UL (ref 0.8–2.9)
LYMPHOCYTES %: 30.4 % (ref 15–51)
MAGNESIUM: 1.7 MG/DL (ref 1.7–2.5)
MEAN CORPUSCULAR HEMOGLOBIN: 31.2 PG (ref 29–33)
MEAN CORPUSCULAR HGB CONC: 35.4 G/DL (ref 32–37)
MEAN CORPUSCULAR VOLUME: 88.2 FL (ref 82–101)
MEAN PLATELET VOLUME: 10.2 FL (ref 7.4–10.4)
MONOCYTE #: 0.4 10^3/UL (ref 0.3–0.9)
MONOCYTES %: 10.6 % (ref 0–11)
NEUTROPHIL #: 1.8 10^3/UL (ref 1.6–7.5)
NEUTROPHILS %: 54 % (ref 39–77)
NUCLEATED RED BLOOD CELLS #: 0 10^3/UL (ref 0–0)
NUCLEATED RED BLOOD CELLS%: 0 /100WBC (ref 0–0)
PLATELET COUNT: 64 10^3/UL (ref 140–415)
POSITIVE DIFF: (no result)
POTASSIUM: 3.9 MMOL/L (ref 3.5–5.1)
PROTIME: 18.4 SEC (ref 11.9–14.9)
PT RATIO: 1.4
RED BLOOD COUNT: 2.79 10^6/UL (ref 4.2–5.4)
RED CELL DISTRIBUTION WIDTH: 15.5 % (ref 11.5–14.5)
SODIUM: 143 MMOL/L (ref 135–144)
TOTAL PROTEIN: 5 G/DL (ref 6.1–8.1)
WHITE BLOOD COUNT: 3.4 10^3/UL (ref 4.8–10.8)

## 2018-11-16 PROCEDURE — 6A550Z2 PHERESIS OF PLATELETS, SINGLE: ICD-10-PCS

## 2018-11-16 PROCEDURE — 0DJ08ZZ INSPECTION OF UPPER INTESTINAL TRACT, VIA NATURAL OR ARTIFICIAL OPENING ENDOSCOPIC: ICD-10-PCS

## 2018-11-16 PROCEDURE — 0DJD8ZZ INSPECTION OF LOWER INTESTINAL TRACT, VIA NATURAL OR ARTIFICIAL OPENING ENDOSCOPIC: ICD-10-PCS

## 2018-11-16 RX ADMIN — PROPOFOL 1: 10 INJECTION, EMULSION INTRAVENOUS at 16:26

## 2018-11-16 RX ADMIN — RIFAXIMIN 1 MG: 550 TABLET ORAL at 09:26

## 2018-11-16 RX ADMIN — LACTULOSE 1 GM: 20 SOLUTION ORAL at 17:58

## 2018-11-16 RX ADMIN — MIDAZOLAM HYDROCHLORIDE 1 MG: 2 INJECTION, SOLUTION INTRAMUSCULAR; INTRAVENOUS at 16:26

## 2018-11-16 RX ADMIN — RIFAXIMIN 1 MG: 550 TABLET ORAL at 20:56

## 2018-11-16 RX ADMIN — INSULIN ASPART 1 UNIT: 100 INJECTION, SOLUTION INTRAVENOUS; SUBCUTANEOUS at 00:52

## 2018-11-16 RX ADMIN — LACTULOSE 1 GM: 20 SOLUTION ORAL at 05:52

## 2018-11-16 RX ADMIN — INSULIN ASPART 1 UNIT: 100 INJECTION, SOLUTION INTRAVENOUS; SUBCUTANEOUS at 05:00

## 2018-11-16 RX ADMIN — INSULIN GLARGINE 1 UNITS: 100 INJECTION, SOLUTION SUBCUTANEOUS at 20:56

## 2018-11-16 RX ADMIN — LACTULOSE 1 GM: 20 SOLUTION ORAL at 12:00

## 2018-11-16 RX ADMIN — BISACODYL 1 MG: 5 TABLET, COATED ORAL at 05:52

## 2018-11-16 RX ADMIN — LACTULOSE 1 GM: 20 SOLUTION ORAL at 00:45

## 2018-11-16 RX ADMIN — INSULIN ASPART 1 UNIT: 100 INJECTION, SOLUTION INTRAVENOUS; SUBCUTANEOUS at 14:00

## 2018-11-16 RX ADMIN — POLYETHYLENE GLYCOL 3350 1 GM: 17 POWDER, FOR SOLUTION ORAL at 05:52

## 2018-11-16 RX ADMIN — INSULIN ASPART 1 UNIT: 100 INJECTION, SOLUTION INTRAVENOUS; SUBCUTANEOUS at 21:10

## 2018-11-16 RX ADMIN — FUROSEMIDE 1 MG: 20 TABLET ORAL at 09:27

## 2018-11-16 RX ADMIN — INSULIN ASPART 1 UNIT: 100 INJECTION, SOLUTION INTRAVENOUS; SUBCUTANEOUS at 08:04

## 2018-11-16 RX ADMIN — INSULIN GLARGINE 1 UNITS: 100 INJECTION, SOLUTION SUBCUTANEOUS at 08:00

## 2018-11-16 RX ADMIN — PANTOPRAZOLE SODIUM 1 MG: 40 TABLET, DELAYED RELEASE ORAL at 05:55

## 2018-11-17 RX ADMIN — LACTULOSE 1 GM: 20 SOLUTION ORAL at 11:52

## 2018-11-17 RX ADMIN — LACTULOSE 1 GM: 20 SOLUTION ORAL at 05:52

## 2018-11-17 RX ADMIN — PANTOPRAZOLE SODIUM 1 MG: 40 TABLET, DELAYED RELEASE ORAL at 05:52

## 2018-11-17 RX ADMIN — RIFAXIMIN 1 MG: 550 TABLET ORAL at 08:29

## 2018-11-17 RX ADMIN — LACTULOSE 1 GM: 20 SOLUTION ORAL at 00:26

## 2018-11-17 RX ADMIN — FUROSEMIDE 1 MG: 20 TABLET ORAL at 08:29

## 2018-11-17 RX ADMIN — INSULIN GLARGINE 1 UNITS: 100 INJECTION, SOLUTION SUBCUTANEOUS at 08:28

## 2018-11-17 RX ADMIN — INSULIN ASPART 1 UNIT: 100 INJECTION, SOLUTION INTRAVENOUS; SUBCUTANEOUS at 12:19

## 2018-11-17 RX ADMIN — INSULIN ASPART 1 UNIT: 100 INJECTION, SOLUTION INTRAVENOUS; SUBCUTANEOUS at 08:29

## 2019-04-05 ENCOUNTER — HOSPITAL ENCOUNTER (INPATIENT)
Dept: HOSPITAL 10 - E/R | Age: 69
LOS: 9 days | Discharge: HOME HEALTH | DRG: 442 | End: 2019-04-14
Attending: INTERNAL MEDICINE | Admitting: INTERNAL MEDICINE
Payer: MEDICARE

## 2019-04-05 ENCOUNTER — HOSPITAL ENCOUNTER (INPATIENT)
Dept: HOSPITAL 91 - 2NE | Age: 69
LOS: 9 days | Discharge: HOME HEALTH | DRG: 442 | End: 2019-04-14
Payer: MEDICARE

## 2019-04-05 VITALS
BODY MASS INDEX: 23.24 KG/M2 | HEIGHT: 66 IN | WEIGHT: 144.62 LBS | HEIGHT: 66 IN | WEIGHT: 144.62 LBS | BODY MASS INDEX: 23.24 KG/M2

## 2019-04-05 VITALS — HEART RATE: 90 BPM | RESPIRATION RATE: 18 BRPM | SYSTOLIC BLOOD PRESSURE: 148 MMHG | DIASTOLIC BLOOD PRESSURE: 65 MMHG

## 2019-04-05 DIAGNOSIS — E11.9: ICD-10-CM

## 2019-04-05 DIAGNOSIS — E87.2: ICD-10-CM

## 2019-04-05 DIAGNOSIS — E72.20: ICD-10-CM

## 2019-04-05 DIAGNOSIS — E83.52: ICD-10-CM

## 2019-04-05 DIAGNOSIS — K74.60: ICD-10-CM

## 2019-04-05 DIAGNOSIS — K72.00: Primary | ICD-10-CM

## 2019-04-05 DIAGNOSIS — R09.02: ICD-10-CM

## 2019-04-05 DIAGNOSIS — F03.90: ICD-10-CM

## 2019-04-05 DIAGNOSIS — K75.81: ICD-10-CM

## 2019-04-05 DIAGNOSIS — N39.0: ICD-10-CM

## 2019-04-05 DIAGNOSIS — D64.9: ICD-10-CM

## 2019-04-05 DIAGNOSIS — D69.59: ICD-10-CM

## 2019-04-05 DIAGNOSIS — E86.0: ICD-10-CM

## 2019-04-05 DIAGNOSIS — R33.9: ICD-10-CM

## 2019-04-05 DIAGNOSIS — H40.9: ICD-10-CM

## 2019-04-05 LAB
ABNORMAL IP MESSAGE: 1
ACETAMINOPHEN: < 10 UG/ML (ref 10–30)
ADD MAN DIFF?: NO
ALANINE AMINOTRANSFERASE: 24 IU/L (ref 13–69)
ALBUMIN/GLOBULIN RATIO: 1.05
ALBUMIN: 3.7 G/DL (ref 3.3–4.9)
ALKALINE PHOSPHATASE: 110 IU/L (ref 42–121)
AMMONIA: 147 UMOL/L (ref 9–30)
ANION GAP: 12 (ref 5–13)
ANISOCYTOSIS: (no result) (ref 0–0)
ASPARTATE AMINO TRANSFERASE: 46 IU/L (ref 15–46)
BASOPHIL #: 0 10^3/UL (ref 0–0.1)
BASOPHILS %: 0.5 % (ref 0–2)
BILIRUBIN,DIRECT: 0 MG/DL (ref 0–0.2)
BILIRUBIN,TOTAL: 1.7 MG/DL (ref 0.2–1.3)
BLOOD UREA NITROGEN: 17 MG/DL (ref 7–20)
CALCIUM: 10.9 MG/DL (ref 8.4–10.2)
CARBON DIOXIDE: 17 MMOL/L (ref 21–31)
CHLORIDE: 112 MMOL/L (ref 97–110)
CREATININE: 0.85 MG/DL (ref 0.44–1)
EOSINOPHILS #: 0 10^3/UL (ref 0–0.5)
EOSINOPHILS %: 0 % (ref 0–7)
ETHANOL: < 10 MG/DL (ref 0–0)
GLOBULIN: 3.5 G/DL (ref 1.3–3.2)
GLUCOSE: 211 MG/DL (ref 70–220)
HEMATOCRIT: 27.8 % (ref 37–47)
HEMOGLOBIN A1C: 5.3 % (ref 0–5.9)
HEMOGLOBIN: 9.5 G/DL (ref 12–16)
IMMATURE GRANS #M: 0.01 10^3/UL (ref 0–0.03)
IMMATURE GRANS % (M): 0.2 % (ref 0–0.43)
LACTIC ACID: 3.7 MMOL/L (ref 0.5–2)
LYMPHOCYTES #: 0.6 10^3/UL (ref 0.8–2.9)
LYMPHOCYTES #M: 0.4 10^3/UL (ref 0.8–2.9)
LYMPHOCYTES % (M): 11 % (ref 15–51)
LYMPHOCYTES %: 14.6 % (ref 15–51)
MEAN CORPUSCULAR HEMOGLOBIN: 31.8 PG (ref 29–33)
MEAN CORPUSCULAR HGB CONC: 34.2 G/DL (ref 32–37)
MEAN CORPUSCULAR VOLUME: 93 FL (ref 82–101)
MEAN PLATELET VOLUME: 10.4 FL (ref 7.4–10.4)
MICROCYTOSIS: (no result) (ref 0–0)
MONOCYTE #: 0.2 10^3/UL (ref 0.3–0.9)
MONOCYTE #M: 0.1 10^3/UL (ref 0.3–0.9)
MONOCYTES % (M): 4 % (ref 0–11)
MONOCYTES %: 5.5 % (ref 0–11)
NEUTROPHIL #: 3.5 10^3/UL (ref 1.6–7.5)
NEUTROPHILS %: 79.2 % (ref 39–77)
NUCLEATED RED BLOOD CELLS #: 0 10^3/UL (ref 0–0)
NUCLEATED RED BLOOD CELLS%: 0 /100WBC (ref 0–0)
PLATELET COUNT: 68 10^3/UL (ref 140–415)
PLATELET ESTIMATE: (no result)
POLYCHROMASIA: (no result) (ref 0–0)
POSITIVE DIFF: (no result)
POTASSIUM: 4.5 MMOL/L (ref 3.5–5.1)
RED BLOOD COUNT: 2.99 10^6/UL (ref 4.2–5.4)
RED CELL DISTRIBUTION WIDTH: 15.8 % (ref 11.5–14.5)
SALICYLATE: < 1 MG/DL (ref 5–30)
SEGMENTED NEUTROPHILS (M) %: 85 % (ref 39–77)
SMUDGE%M: 19 % (ref 0–0)
SODIUM: 141 MMOL/L (ref 135–144)
TOTAL PROTEIN: 7.2 G/DL (ref 6.1–8.1)
WHITE BLOOD COUNT: 4.4 10^3/UL (ref 4.8–10.8)

## 2019-04-05 PROCEDURE — 83615 LACTATE (LD) (LDH) ENZYME: CPT

## 2019-04-05 PROCEDURE — 36600 WITHDRAWAL OF ARTERIAL BLOOD: CPT

## 2019-04-05 PROCEDURE — 92610 EVALUATE SWALLOWING FUNCTION: CPT

## 2019-04-05 PROCEDURE — 80076 HEPATIC FUNCTION PANEL: CPT

## 2019-04-05 PROCEDURE — 96374 THER/PROPH/DIAG INJ IV PUSH: CPT

## 2019-04-05 PROCEDURE — 99285 EMERGENCY DEPT VISIT HI MDM: CPT

## 2019-04-05 PROCEDURE — 97162 PT EVAL MOD COMPLEX 30 MIN: CPT

## 2019-04-05 PROCEDURE — 83036 HEMOGLOBIN GLYCOSYLATED A1C: CPT

## 2019-04-05 PROCEDURE — 82140 ASSAY OF AMMONIA: CPT

## 2019-04-05 PROCEDURE — 85049 AUTOMATED PLATELET COUNT: CPT

## 2019-04-05 PROCEDURE — 85670 THROMBIN TIME PLASMA: CPT

## 2019-04-05 PROCEDURE — 81001 URINALYSIS AUTO W/SCOPE: CPT

## 2019-04-05 PROCEDURE — 83735 ASSAY OF MAGNESIUM: CPT

## 2019-04-05 PROCEDURE — 76700 US EXAM ABDOM COMPLETE: CPT

## 2019-04-05 PROCEDURE — 94664 DEMO&/EVAL PT USE INHALER: CPT

## 2019-04-05 PROCEDURE — 86850 RBC ANTIBODY SCREEN: CPT

## 2019-04-05 PROCEDURE — 83540 ASSAY OF IRON: CPT

## 2019-04-05 PROCEDURE — 97165 OT EVAL LOW COMPLEX 30 MIN: CPT

## 2019-04-05 PROCEDURE — 70450 CT HEAD/BRAIN W/O DYE: CPT

## 2019-04-05 PROCEDURE — 82270 OCCULT BLOOD FECES: CPT

## 2019-04-05 PROCEDURE — 85730 THROMBOPLASTIN TIME PARTIAL: CPT

## 2019-04-05 PROCEDURE — 97530 THERAPEUTIC ACTIVITIES: CPT

## 2019-04-05 PROCEDURE — 85610 PROTHROMBIN TIME: CPT

## 2019-04-05 PROCEDURE — 82803 BLOOD GASES ANY COMBINATION: CPT

## 2019-04-05 PROCEDURE — 86920 COMPATIBILITY TEST SPIN: CPT

## 2019-04-05 PROCEDURE — 80307 DRUG TEST PRSMV CHEM ANLYZR: CPT

## 2019-04-05 PROCEDURE — 82728 ASSAY OF FERRITIN: CPT

## 2019-04-05 PROCEDURE — 83605 ASSAY OF LACTIC ACID: CPT

## 2019-04-05 PROCEDURE — 71045 X-RAY EXAM CHEST 1 VIEW: CPT

## 2019-04-05 PROCEDURE — 85045 AUTOMATED RETICULOCYTE COUNT: CPT

## 2019-04-05 PROCEDURE — 80048 BASIC METABOLIC PNL TOTAL CA: CPT

## 2019-04-05 PROCEDURE — 82962 GLUCOSE BLOOD TEST: CPT

## 2019-04-05 PROCEDURE — 94640 AIRWAY INHALATION TREATMENT: CPT

## 2019-04-05 PROCEDURE — 93005 ELECTROCARDIOGRAM TRACING: CPT

## 2019-04-05 PROCEDURE — 87040 BLOOD CULTURE FOR BACTERIA: CPT

## 2019-04-05 PROCEDURE — 84100 ASSAY OF PHOSPHORUS: CPT

## 2019-04-05 PROCEDURE — 72125 CT NECK SPINE W/O DYE: CPT

## 2019-04-05 PROCEDURE — 86900 BLOOD TYPING SEROLOGIC ABO: CPT

## 2019-04-05 PROCEDURE — 83010 ASSAY OF HAPTOGLOBIN QUANT: CPT

## 2019-04-05 PROCEDURE — 85025 COMPLETE CBC W/AUTO DIFF WBC: CPT

## 2019-04-05 PROCEDURE — 87086 URINE CULTURE/COLONY COUNT: CPT

## 2019-04-05 PROCEDURE — 86901 BLOOD TYPING SEROLOGIC RH(D): CPT

## 2019-04-05 PROCEDURE — 36415 COLL VENOUS BLD VENIPUNCTURE: CPT

## 2019-04-05 PROCEDURE — 80053 COMPREHEN METABOLIC PANEL: CPT

## 2019-04-05 PROCEDURE — 97116 GAIT TRAINING THERAPY: CPT

## 2019-04-05 RX ADMIN — LACTULOSE SCH GM: 10 SOLUTION ORAL at 22:02

## 2019-04-05 RX ADMIN — THIAMINE HYDROCHLORIDE 1 MLS/HR: 100 INJECTION, SOLUTION INTRAMUSCULAR; INTRAVENOUS at 20:14

## 2019-04-05 RX ADMIN — LACTULOSE 1 GM: 20 SOLUTION ORAL at 22:02

## 2019-04-05 RX ADMIN — THIAMINE HYDROCHLORIDE 1 ML: 100 INJECTION, SOLUTION INTRAMUSCULAR; INTRAVENOUS at 16:27

## 2019-04-05 RX ADMIN — ACETAZOLAMIDE 1 MG: 500 CAPSULE, EXTENDED RELEASE ORAL at 22:02

## 2019-04-05 RX ADMIN — CEFTRIAXONE 1 MLS/HR: 1 INJECTION, SOLUTION INTRAVENOUS at 15:39

## 2019-04-05 RX ADMIN — ACETAZOLAMIDE SCH MG: 500 CAPSULE, EXTENDED RELEASE ORAL at 22:02

## 2019-04-05 NOTE — ERD
ER Documentation


Chief Complaint


Chief Complaint





Fall x 2 since yesterday hx of dementia





HPI


Patient is a 68-year-old female with dementia and cirrhosis who presents with 


altered mental status.  Please note the history and physical exam is limited 


secondary to the patient's mental status at this time and she is nonverbal.  The


patient was brought in by ambulance.  She had 2 falls since yesterday.  She has 


dementia but this is more altered than usual per her daughter.





ROS


All systems reviewed and are negative except as per history of present illness.





Medications


Home Meds


Reported Medications


Nepafenac (ILEVRO) 1.7 Ml Drops.susp, 1.7 ML OP


   4/5/19


Besifloxacin Hydrochloride (Besivance) 5 Ml Drops.susp, 5 ML OP


   4/5/19


Difluprednate (Durezol) 5 Ml Drops, 5 ML OP, BOTTLE


   4/5/19


Insulin Aspart* (Novolog Insulin Pen*) 100 Unit/Ml Soln, 12 UNIT SC, EA


   THE DAUGHTER UNABLE TO VERIFIED FREQUENCY


   4/5/19


Insulin Detemir (Levemir) 100 Unit/1 Ml Vial, 60 UNITS


   THE DAUGHTER UNABLE TO VERIFIED FREQUENCY


   4/5/19


Furosemide* (Furosemide*) 20 Mg Tablet, 20 MG PO DAILY, #60 TAB


   4/5/19


Spironolactone* (Aldactone*) 50 Mg Tablet, 50 MG PO DAILY, #30 TAB


   4/5/19


Acetazolamide* (Acetazolamide* ER) 500 Mg Capsule.er, 500 MG PO BID, #60 CAP


   4/5/19


Metformin Hcl* (Metformin Hcl*) 1,000 Mg Tablet, 1000 MG PO WITH BREAKFAST, #30 


TAB


   4/5/19


Rifaximin* (Xifaxan*) 550 Mg Tablet, 550 MG PO DAILY, TAB


   4/5/19


Pantoprazole* (Pantoprazole*) 40 Mg Tablet.dr, 40 MG PO AC BREAKFAST, TAB


   4/5/19


Meclizine Hcl* (Meclizine Hcl*) 25 Mg Tablet, 25 MG PO DAILY PRN for DIZZINESS, 


TAB


   4/5/19


Ferrous Sulfate* (Ferrous Sulfate*) 325 Mg Tabec, 325 MG PO DAILY, TAB


   4/5/19


Discontinued Reported Medications


Multivits-Min/Iron/FA/Lutein (Centrum Silver Women Tablet) 1 Each Tablet, 1 EACH


PO, TAB


   11/13/18


Cholecalciferol* (Vitamin D*) 400 Unit Tablet, 400 UNIT PO DAILY, TAB


   11/13/18


Ascorbic Acid* (Vitamin C*) 500 Mg Capsule.sa, 500 MG PO DAILY, CAP


   11/13/18


Metformin* (Glucophage*) 1,000 Mg Tablet, 1000 MG PO QHS, #60 TAB


   11/13/18


Clotrimazole-Betamethasone Diprop (Clotrimazole-Betamethasone Diprop) 15 Gm 


Cream.gm., 1 APPLIC TOP BID, TUB


   11/13/18


Meclizine Hcl* (Meclizine Hcl*) 25 Mg Tablet, 25 MG PO Q8H PRN for DIZZINESS, 


TAB


   11/13/18


Calcium Carbonate ([id number]) 500 Mg Tablet, 500 MG PO BID, TAB


   9/6/17


Gabapentin* (Gabapentin*) 100 Mg Capsule, 100 MG PO TID PRN for DECREASED NEURO 


STATUS, CAP


   5/10/15


Rifaximin* (Xifaxan*) 550 Mg Tablet, 550 MG PO BID


   5/8/14


Furosemide* (Lasix*) 20 Mg Tablet, 20 MG PO DAILY


   6/27/13


Discontinued Scripts


Lactulose* (Lactulose*) 20 Gm/30 Ml Solution, 20 GM PO Q6H for CONSTIPATION for 


30 Days, #1 BOT 6 Refills


   Prov:JASBIR PINEDA MD         11/17/18


Insulin Detemir (Levemir) 100 Unit/1 Ml Vial, 20 UNIT SC BID for 30 Days, #1 


VIAL 6 Refills


   Prov:JASBIR PINEDA MD         11/17/18


Metoclopramide* (Reglan*) 10 Mg Tablet, 10 MG PO AC MEALS for 30 Days, #90 TAB


   Prov:JOYCE PEREZ NP         8/7/18


Pantoprazole* (Protonix*) 40 Mg Tablet.dr, 40 MG PO DAILY, #30 TAB


   Prov:JEAN HEALY NP         3/5/18





Allergies


Allergies:  


Coded Allergies:  


     morphine (Unverified  Allergy, Mild, 4/5/19)


   


   FACIAL REDNESS


     aspirin (Unverified  Adverse Reaction, Unknown, 4/5/19)





PMhx/Soc


History of Surgery:  No


Anesthesia Reaction:  No


Hx Neurological Disorder:  Yes (dementia)


Hx Respiratory Disorders:  No


Hx Cardiac Disorders:  No


Hx Psychiatric Problems:  No


Hx Miscellaneous Medical Probl:  Yes (nonalcoholic liver cirrhosis, DM .)


Hx Alcohol Use:  No


Hx Substance Use:  No


Hx Tobacco Use:  No


Smoking Status:  Unknown if ever smoked





FmHx


Unable to obtain





Physical Exam


Vitals





Vital Signs


  Date      Temp  Pulse  Resp  B/P (MAP)   Pulse Ox  O2          O2 Flow    FiO2


Time                                                 Delivery    Rate


    4/5/19  97.8     92    18      168/54       100


     15:25                           (92)





Physical Exam


Const:   No acute distress


Head:   Atraumatic 


Eyes:    Normal Conjunctiva


ENT:    Normal External Ears, Nose and Mouth.


Neck:               Full range of motion. No meningismus.


Resp:   Clear to auscultation bilaterally


Cardio:   Regular rate and rhythm, no murmurs


Abd:    Soft, non tender, non distended. Normal bowel sounds


Skin:   No petechiae or rashes


Back:   No midline or flank tenderness


Ext:    No cyanosis, or edema


Neur:   Awake and alert


Psych:    Normal Mood and Affect


Result Diagram:  


4/5/19 1538                                                                     


          4/5/19 1538





Results 24 hrs





Laboratory Tests


Test
                     4/5/19
15:38  4/5/19
15:39  4/5/19
16:10  4/5/19
16:20


White Blood Count         4.4 10^3/ul


Red Blood Count          2.99 10^6/ul


Hemoglobin                   9.5 g/dl


Hematocrit                     27.8 %


Mean Corpuscular              93.0 fl


Volume


Mean Corpuscular              31.8 pg


Hemoglobin


Mean Corpuscular           34.2 g/dl 
  
             
             



Hemoglobin
Concent


Red Cell Distribution          15.8 %


Width


Platelet Count             68 10^3/UL


Mean Platelet Volume          10.4 fl


Immature Granulocytes         0.200 %


%


Neutrophils %                  79.2 %


Segmented Neutrophils           85 % 
  
             
             



%
(Manual)


Lymphocytes %                  14.6 %


Lymphocytes % (Manual)           11 %


Monocytes %                     5.5 %


Monocytes % (Manual)              4 %


Eosinophils %                   0.0 %


Basophils %                     0.5 %


Nucleated Red Blood       0.0 /100WBC


Cells %


Immature Granulocytes   0.010 10^3/ul


#


Neutrophils #             3.5 10^3/ul


Lymphocytes (Manual)      0.4 10^3/ul


Lymphocytes #             0.6 10^3/ul


Monocytes #               0.2 10^3/ul


Monocytes # (Manual)      0.1 10^3/ul


Eosinophils #             0.0 10^3/ul


Basophils #               0.0 10^3/ul


Nucleated Red Blood       0.0 10^3/ul


Cells #


Platelet Estimate       DECREASED


Polychromasia                      1+


Anisocytosis                       1+


Microcytosis                       1+


Sodium Level               141 mmol/L


Potassium Level            4.5 mmol/L


Chloride Level             112 mmol/L


Carbon Dioxide Level        17 mmol/L


Anion Gap                          12


Blood Urea Nitrogen          17 mg/dl


Creatinine                 0.85 mg/dl


Est Glomerular Filtrat  > 60 mL/min 
   
             
             



Rate
mL/min


Glucose Level               211 mg/dl


Calcium Level              10.9 mg/dl


Total Bilirubin             1.7 mg/dl


Direct Bilirubin           0.00 mg/dl


Indirect Bilirubin          1.7 mg/dl


Aspartate Amino              46 IU/L 
  
             
             



Transf
(AST/SGOT)


Alanine                      24 IU/L 
  
             
             



Aminotransferase
(ALT/


SGPT)


Alkaline Phosphatase         110 IU/L


Total Protein                7.2 g/dl


Albumin                      3.7 g/dl


Globulin                    3.50 g/dl


Albumin/Globulin Ratio           1.05


Salicylates Level       < 1.0 mg/dl


Acetaminophen Level     < 10.0 ug/ml


Ethyl Alcohol Level     < 10.0 mg/dl


POC Venous Lactate                       3.1 mmol/L


Lactic Acid Level                                      3.7 mmol/L


Ammonia                                                              147 umol/l





Current Medications


 Medications
   Dose
          Sig/Candido
       Start Time
   Status  Last


 (Trade)       Ordered        Route
 PRN     Stop Time              Admin
Dose


                              Reason                                Admin


 Sodium         1,800 ml       BOLUS OVER 2   4/5/19        DC            4/5/19


Chloride
                     HOURS STAT
    15:39
 4/5/19                16:27



(NS)                          IV*
           15:41


 Ceftriaxone    50 ml @ 
      ONCE  STAT
    4/5/19        DC            4/5/19


Sodium         100 mls/hr     IVPB
          15:39
 4/5/19                15:39



                                             16:08








Procedures/MDM


CT brain negative for bleed per radiology.  CT cervical spine read by radiology.





Patient is a 68-year-old female who presents altered.  She was found to have 


hyperammonemia with an elevated ammonia level.  She cannot be given lactulose in


the emergency department as she did not pass a swallow evaluation.  She will 


either need NG tube on the floor or rectal lactulose.  She will be admitted to 


the care of Dr. Pineda from the panel team.  She does require medical surgical 


inpatient admission at this time.  She has an elevated lactic acid of greater 


than 3 but at this point I doubt sepsis and see no sign of infection.  I believe


the elevated lactic acid is likely related to dehydration and high ammonia.





Departure


Diagnosis:  


   Primary Impression:  


   Hyperammonemia


   Additional Impressions:  


   Altered mental status


   Altered mental status type:  unspecified  Qualified Codes:  R41.82 - Altered 


   mental status, unspecified


   Fall


   Encounter type:  initial encounter  Qualified Codes:  W19.XXXA - Unspecified 


   fall, initial encounter


Condition:  LEBRON Lagunas MD                 Apr 5, 2019 18:51

## 2019-04-05 NOTE — HP
Date/Time of Note


Date/Time of Note


DATE: 4/5/19 


TIME: 18:46





Assessment/Plan


VTE Prophylaxis


SCD applied (from Nsg):  Yes


Pharmacological prophylaxis:  NA/contraindicated


Pharm contraindication:  liver dx





Lines/Catheters


IV Catheter Type (from Nrsg):  Saline Lock





Assessment/Plan


Assessment/Plan


1.  Acute hepatic encephalopathy


- Patient noted with elevated ammonia level.  Continue on Rifaximin and 


Lactulose and will titrate to 3-4 BMs daily


- CT head performed with no acute abnormalities


- monitor for improvement in mentation


- ?if new medication, acetazolamide is contributing vs worsening of dementia





2.  DM


- will check A1c 


- A1c from 11/2018 was 5.4 so will hold off on ISS and accuchecks for now


- holding Metformin





3.  Lactic Acidosis


- patient appears dehydrated and on Metformin which is most likely etiology of 


elevated lactate levels





4.  Hypercalcemia


- possibly due to dehydration.  if remains elevated following fluids will workup





5.  Dehydration


- will give 1 day of NS and reassess fluid status





6.  Hyperammonemia


- 147


- continue on lactulose and rifaximin





7. h/o Glaucoma


- will ask daughter to bring eye drops from home since not available in our 


pharmacy


- will hold off on acetazolamide for now given may be contributing to confusion





8.  Diet


- cardiac





9.  Disposition


- Admit for treatment of acute hepatic encephalopathy.  Will monitor for 


improvement in neurological status and discharge home once at baseline


=


Result Diagram:  


4/5/19 1538                                                                     


          4/5/19 1538





Results 24hrs





Laboratory Tests


Test
                     4/5/19
15:38  4/5/19
15:39  4/5/19
16:10  4/5/19
16:20


White Blood Count              4.4  #L


Red Blood Count                2.99  L


Hemoglobin                      9.5  L


Hematocrit                     27.8  L


Mean Corpuscular Volume         93.0


Mean Corpuscular                31.8


Hemoglobin


Mean Corpuscular               34.2  
  
             
             



Hemoglobin
Concent


Red Cell Distribution          15.8  H


Width


Platelet Count                   68  L


Mean Platelet Volume            10.4


Immature Granulocytes %        0.200


Neutrophils %                  79.2  H


Segmented Neutrophils           85  H
  
             
             



%
(Manual)


Lymphocytes %                  14.6  L


Lymphocytes % (Manual)           11  L


Monocytes %                      5.5


Monocytes % (Manual)               4


Eosinophils %                    0.0


Basophils %                      0.5


Nucleated Red Blood              0.0


Cells %


Immature Granulocytes #        0.010


Neutrophils #                    3.5


Lymphocytes (Manual)            0.4  L


Lymphocytes #                   0.6  L


Monocytes #                     0.2  L


Monocytes # (Manual)            0.1  L


Eosinophils #                    0.0


Basophils #                      0.0


Nucleated Red Blood              0.0


Cells #


Platelet Estimate         DECREASED


Polychromasia                     1+


Anisocytosis                      1+


Microcytosis                      1+


Sodium Level                     141


Potassium Level                  4.5


Chloride Level                  112  H


Carbon Dioxide Level             17  L


Anion Gap                         12


Blood Urea Nitrogen               17


Creatinine                      0.85


Est Glomerular Filtrat    > 60  
       
             
             



Rate
mL/min


Glucose Level                    211


Calcium Level                  10.9  H


Total Bilirubin                 1.7  H


Direct Bilirubin                0.00


Indirect Bilirubin              1.7  H


Aspartate Amino                  46  
  
             
             



Transf
(AST/SGOT)


Alanine                          24  
  
             
             



Aminotransferase
(ALT/SG


PT)


Alkaline Phosphatase             110


Total Protein                    7.2


Albumin                          3.7


Globulin                       3.50  H


Albumin/Globulin Ratio          1.05


Salicylates Level         < 1.0  L


Acetaminophen Level       < 10.0  L


Ethyl Alcohol Level       < 10.0  H


POC Venous Lactate                           3.1  *H


Lactic Acid Level                                          3.7  *H


Ammonia                                                                   147  H








HPI/ROS


Admit Date/Time


Admit Date/Time


Apr 5, 2019 at 17:21





Hx of Present Illness


67 yo F with PMH liver cirrhosis and Alzheimer's dementia secondary to [place] 


presented to ED secondary to altered mental status.  Patient is a poor historian


and daughter is at bedside.  History obtained form daughter.  Per daughter, 


patient has been able to recognize her but today was more confused and didn't 


know who she was.  She underwent cataract surgery 2 days ago and was started on 


Acetazolamide which shes concerned may be causing the confusion.  Patient has 


been taking all medications as prescribed.  Patient was admitted Nov 2018 for 


AMS which daughter states these symptoms are the same as previous admission.  In


the ED patient was found with an elevated ammonia level of 147.





ROS


All 12 systems reviewed and pertinent positives as per HPI.  Unable to obtain 


proper ROS due to AMS.


Constitutional:  disoriented





PMH/Family/Social


Past Medical History


Medical History:  other (liver cirrhosis, dementia)


Medications





Current Medications


Ondansetron HCl (Zofran Inj) 4 mg BRIDGE ORDER PRN IV NAUSEA/VOMITING;  Start 


4/5/19 at 17:30;  Stop 4/6/19 at 17:29


Acetaminophen (Tylenol Tab) 650 mg ER BRIDGE PRN PO .MILD PAIN 1-3 OR TEMP;  


Start 4/5/19 at 17:30;  Stop 4/6/19 at 17:29


Ferrous Sulfate (Ferrous Sulfate (Ec)) 325 mg DAILY PO ;  Start 4/6/19 at 09:00;


 Status UNV


Furosemide (Lasix) 20 mg DAILY PO ;  Start 4/6/19 at 09:00;  Status UNV


Insulin Aspart (Novolog Insulin Pen) 12 unit WITH  MEALS SC ;  Start 4/6/19 at 


07:35;  Status UNV


Insulin Detemir (Levemir) 60 units DAILY SC ;  Start 4/6/19 at 09:00;  Status 


UNV


Meclizine HCl (Antivert) 25 mg DAILY  PRN PO DIZZINESS;  Start 4/5/19 at 19:00; 


Status UNV


Pantoprazole (Protonix Tab) 40 mg AC  BREAKFAST PO ;  Start 4/6/19 at 07:30;  


Status UNV


Rifaximin (Xifaxan) 550 mg DAILY PO ;  Start 4/6/19 at 09:00;  Status UNV


Spironolactone (Aldactone) 50 mg DAILY PO ;  Start 4/6/19 at 09:00;  Status UNV


Lactulose (Enulose) 20 gm Q8 PO ;  Start 4/5/19 at 22:00;  Status UNV


Sodium Chloride 1,000 ml @  60 mls/hr [id number] IV ;  Start 4/5/19 at 19:00;  Stop


4/6/19 at 18:59;  Status UNV


Coded Allergies:  


     morphine (Unverified  Allergy, Mild, 4/5/19)


   


   FACIAL REDNESS


     aspirin (Unverified  Adverse Reaction, Unknown, 4/5/19)





Past Surgical History


Past Surgical Hx:  other (TIPS 2011)





Family History


Significant Family History:  no pertinent family hx





Social History


Alcohol Use:  none


Smoking Status:  Unknown if ever smoked


Drug Use:  none





Exam/Review of Systems


Vital Signs


Vitals





Vital Signs


  Date      Temp  Pulse  Resp  B/P (MAP)   Pulse Ox  O2          O2 Flow    FiO2


Time                                                 Delivery    Rate


    4/5/19           86    16      154/61        99  Room Air


     17:57                           (92)


    4/5/19  97.8


     15:25








Exam


Exam


General: Patient is laying in bed, no acute distress. pleasantly confused


Head: Normocephalic atraumatic


Eyes: EOMI, right eye with cover due to recent cataract surgery


Neck: Supple, nontender, midline


Respiratory: Clear to auscultation bilaterally. no wheezing or rhonchi


Cardiovascular: S1, S2, regular rate and rhythm, no obvious murmurs


Gastrointestinal: soft, non-tender to palpation, nondistended, bowel sounds 


heard. no rebound or guarding


Ext: Moves all extremities spontaneously. no cyanosis, edema, or clubbing


Skin: No new skin lesions.


Additional Comments


Home medications reviewed











JASBIR BRYANT MD                  Apr 5, 2019 19:03

## 2019-04-06 VITALS — DIASTOLIC BLOOD PRESSURE: 79 MMHG | SYSTOLIC BLOOD PRESSURE: 127 MMHG | HEART RATE: 96 BPM | RESPIRATION RATE: 20 BRPM

## 2019-04-06 VITALS — SYSTOLIC BLOOD PRESSURE: 151 MMHG | HEART RATE: 98 BPM | DIASTOLIC BLOOD PRESSURE: 75 MMHG | RESPIRATION RATE: 20 BRPM

## 2019-04-06 VITALS — HEART RATE: 86 BPM | DIASTOLIC BLOOD PRESSURE: 62 MMHG | RESPIRATION RATE: 18 BRPM | SYSTOLIC BLOOD PRESSURE: 142 MMHG

## 2019-04-06 VITALS — RESPIRATION RATE: 19 BRPM | SYSTOLIC BLOOD PRESSURE: 135 MMHG | DIASTOLIC BLOOD PRESSURE: 62 MMHG | HEART RATE: 99 BPM

## 2019-04-06 LAB
ABNORMAL IP MESSAGE: 1
ADD MAN DIFF?: NO
ALANINE AMINOTRANSFERASE: 23 IU/L (ref 13–69)
ALBUMIN/GLOBULIN RATIO: 1.12
ALBUMIN: 3.7 G/DL (ref 3.3–4.9)
ALKALINE PHOSPHATASE: 99 IU/L (ref 42–121)
AMMONIA: 111 UMOL/L (ref 9–30)
ANION GAP: 12 (ref 5–13)
ASPARTATE AMINO TRANSFERASE: 50 IU/L (ref 15–46)
BASOPHIL #: 0 10^3/UL (ref 0–0.1)
BASOPHILS %: 0.4 % (ref 0–2)
BILIRUBIN,DIRECT: 0 MG/DL (ref 0–0.2)
BILIRUBIN,TOTAL: 1.3 MG/DL (ref 0.2–1.3)
BLOOD UREA NITROGEN: 18 MG/DL (ref 7–20)
CALCIUM: 10.6 MG/DL (ref 8.4–10.2)
CARBON DIOXIDE: 16 MMOL/L (ref 21–31)
CHLORIDE: 117 MMOL/L (ref 97–110)
CREATININE: 0.75 MG/DL (ref 0.44–1)
EOSINOPHILS #: 0 10^3/UL (ref 0–0.5)
EOSINOPHILS %: 0.4 % (ref 0–7)
GLOBULIN: 3.3 G/DL (ref 1.3–3.2)
GLUCOSE: 189 MG/DL (ref 70–220)
HEMATOCRIT: 26.2 % (ref 37–47)
HEMOGLOBIN: 8.8 G/DL (ref 12–16)
IMMATURE GRANS #M: 0.02 10^3/UL (ref 0–0.03)
IMMATURE GRANS % (M): 0.4 % (ref 0–0.43)
LACTIC ACID: 2.3 MMOL/L (ref 0.5–2)
LYMPHOCYTES #: 0.9 10^3/UL (ref 0.8–2.9)
LYMPHOCYTES %: 17.1 % (ref 15–51)
MAGNESIUM: 1.7 MG/DL (ref 1.7–2.5)
MEAN CORPUSCULAR HEMOGLOBIN: 31.2 PG (ref 29–33)
MEAN CORPUSCULAR HGB CONC: 33.6 G/DL (ref 32–37)
MEAN CORPUSCULAR VOLUME: 92.9 FL (ref 82–101)
MEAN PLATELET VOLUME: 10.5 FL (ref 7.4–10.4)
MONOCYTE #: 0.4 10^3/UL (ref 0.3–0.9)
MONOCYTES %: 7 % (ref 0–11)
NEUTROPHIL #: 3.8 10^3/UL (ref 1.6–7.5)
NEUTROPHILS %: 74.7 % (ref 39–77)
NUCLEATED RED BLOOD CELLS #: 0 10^3/UL (ref 0–0)
NUCLEATED RED BLOOD CELLS%: 0 /100WBC (ref 0–0)
PLATELET COUNT: 68 10^3/UL (ref 140–415)
POSITIVE DIFF: (no result)
POTASSIUM: 3.8 MMOL/L (ref 3.5–5.1)
RED BLOOD COUNT: 2.82 10^6/UL (ref 4.2–5.4)
RED CELL DISTRIBUTION WIDTH: 15.6 % (ref 11.5–14.5)
SODIUM: 145 MMOL/L (ref 135–144)
TOTAL PROTEIN: 7 G/DL (ref 6.1–8.1)
WHITE BLOOD COUNT: 5 10^3/UL (ref 4.8–10.8)

## 2019-04-06 RX ADMIN — ASCORBIC ACID, VITAMIN A PALMITATE, CHOLECALCIFEROL, THIAMINE HYDROCHLORIDE, RIBOFLAVIN-5 PHOSPHATE SODIUM, PYRIDOXINE HYDROCHLORIDE, NIACINAMIDE, DEXPANTHENOL, ALPHA-TOCOPHEROL ACETATE, VITAMIN K1, FOLIC ACID, BIOTIN, CYANOCOBALAMIN 1 MLS/HR: 200; 3300; 200; 6; 3.6; 6; 40; 15; 10; 150; 600; 60; 5 INJECTION, SOLUTION INTRAVENOUS at 14:27

## 2019-04-06 RX ADMIN — ACETAZOLAMIDE 1 MG: 500 CAPSULE, EXTENDED RELEASE ORAL at 10:47

## 2019-04-06 RX ADMIN — POTASSIUM CHLORIDE SCH MLS/HR: 2 INJECTION, SOLUTION, CONCENTRATE INTRAVENOUS at 09:30

## 2019-04-06 RX ADMIN — LACTULOSE SCH GM: 10 SOLUTION ORAL at 18:07

## 2019-04-06 RX ADMIN — INSULIN GLARGINE SCH UNITS: 100 INJECTION, SOLUTION SUBCUTANEOUS at 08:45

## 2019-04-06 RX ADMIN — LACTULOSE 1 GM: 20 SOLUTION ORAL at 14:06

## 2019-04-06 RX ADMIN — RIFAXIMIN SCH MG: 550 TABLET ORAL at 08:52

## 2019-04-06 RX ADMIN — INSULIN GLARGINE 1 UNITS: 100 INJECTION, SOLUTION SUBCUTANEOUS at 08:45

## 2019-04-06 RX ADMIN — PANTOPRAZOLE SODIUM 1 MG: 40 TABLET, DELAYED RELEASE ORAL at 08:56

## 2019-04-06 RX ADMIN — FUROSEMIDE 1 MG: 20 TABLET ORAL at 08:52

## 2019-04-06 RX ADMIN — INSULIN ASPART 1 UNIT: 100 INJECTION, SOLUTION INTRAVENOUS; SUBCUTANEOUS at 11:30

## 2019-04-06 RX ADMIN — INSULIN ASPART 1 UNIT: 100 INJECTION, SOLUTION INTRAVENOUS; SUBCUTANEOUS at 17:35

## 2019-04-06 RX ADMIN — LACTULOSE SCH GM: 10 SOLUTION ORAL at 06:06

## 2019-04-06 RX ADMIN — SPIRONOLACTONE SCH MG: 50 TABLET ORAL at 08:51

## 2019-04-06 RX ADMIN — POTASSIUM CHLORIDE SCH MLS/HR: 2 INJECTION, SOLUTION, CONCENTRATE INTRAVENOUS at 14:27

## 2019-04-06 RX ADMIN — SPIRONOLACTONE 1 MG: 50 TABLET, FILM COATED ORAL at 08:51

## 2019-04-06 RX ADMIN — LACTULOSE SCH GM: 10 SOLUTION ORAL at 14:06

## 2019-04-06 RX ADMIN — ACETAZOLAMIDE SCH MG: 500 CAPSULE, EXTENDED RELEASE ORAL at 10:47

## 2019-04-06 RX ADMIN — INSULIN ASPART 1 UNIT: 100 INJECTION, SOLUTION INTRAVENOUS; SUBCUTANEOUS at 08:44

## 2019-04-06 RX ADMIN — LACTULOSE 1 GM: 20 SOLUTION ORAL at 18:07

## 2019-04-06 RX ADMIN — PANTOPRAZOLE SODIUM SCH MG: 40 TABLET, DELAYED RELEASE ORAL at 08:56

## 2019-04-06 RX ADMIN — ASCORBIC ACID, VITAMIN A PALMITATE, CHOLECALCIFEROL, THIAMINE HYDROCHLORIDE, RIBOFLAVIN-5 PHOSPHATE SODIUM, PYRIDOXINE HYDROCHLORIDE, NIACINAMIDE, DEXPANTHENOL, ALPHA-TOCOPHEROL ACETATE, VITAMIN K1, FOLIC ACID, BIOTIN, CYANOCOBALAMIN 1 MLS/HR: 200; 3300; 200; 6; 3.6; 6; 40; 15; 10; 150; 600; 60; 5 INJECTION, SOLUTION INTRAVENOUS at 09:30

## 2019-04-06 RX ADMIN — FERROUS SULFATE TAB 325 MG (65 MG ELEMENTAL FE) SCH MG: 325 (65 FE) TAB at 08:52

## 2019-04-06 RX ADMIN — LACTULOSE 1 GM: 20 SOLUTION ORAL at 06:06

## 2019-04-06 RX ADMIN — RIFAXIMIN 1 MG: 550 TABLET ORAL at 08:52

## 2019-04-06 RX ADMIN — FERROUS SULFATE TAB 325 MG (65 MG ELEMENTAL FE) 1 MG: 325 (65 FE) TAB at 08:52

## 2019-04-06 RX ADMIN — HALOPERIDOL LACTATE 1 MG: 5 INJECTION, SOLUTION INTRAMUSCULAR at 00:07

## 2019-04-06 NOTE — PN
Date/Time of Note


Date/Time of Note


DATE: 4/6/19 


TIME: 09:13





Assessment/Plan


VTE Prophylaxis


Risk score (from [place])>0 risk:  3


SCD applied (from [facility]):  No


SCD contraindicated:  low risk/ambulating


Pharmacological prophylaxis:  NA/contraindicated


Pharm contraindication:  liver dx, thrombocytopenia





Lines/Catheters


IV Catheter Type (from [facility]):  Peripheral IV


Urinary Cath still in place:  No





Assessment/Plan


Assessment/Plan


1.  Acute hepatic encephalopathy


- patient remains pleasantly confused.  Will adjust lactulose dose and monitor 


for BMs.  Ammonia level still elevated but trending downward


- CT head performed with no acute abnormalities


- Discussed medications with daughter and she requested for Diamox to be held.  


Understands risks of holding medication but she believes her mothers mentation 


declined following the start of this medication





2.  Urinary retention


- will have hawk placed in setting of AMS.  once mentation improves, will d/c 


hawk 





3.  DM


- A1c noted.  No need for ISS at this time


- holding Metformin





3.  Lactic Acidosis- improving


- patient appears dehydrated and on Metformin which is most likely etiology of 


elevated lactate levels





4.  Hypercalcemia


- possibly due to dehydration. continue fluids





5.   Hyperammonemia- improving


- 147


- continue on lactulose and rifaximin. Increasing lactulose until passing BM and


then will titrate for 3-4 BM a day





6. h/o Glaucoma


- continue home eye drops


- hold off on Diamox for now





7.  Disposition


- Will adjust Lactulose dose for 3-4 BMs daily and monitor for improvement in 


mentation


Result Diagram:  


4/6/19 0552                                                                     


          4/6/19 0552





Results 24hrs





Laboratory Tests


Test
                     4/5/19
15:38  4/5/19
15:39  4/5/19
16:10  4/5/19
16:20


White Blood Count              4.4  #L


Red Blood Count                2.99  L


Hemoglobin                      9.5  L


Hematocrit                     27.8  L


Mean Corpuscular Volume         93.0


Mean Corpuscular                31.8


Hemoglobin


Mean Corpuscular               34.2  
  
             
             



Hemoglobin
Concent


Red Cell Distribution          15.8  H


Width


Platelet Count                   68  L


Mean Platelet Volume            10.4


Immature Granulocytes %        0.200


Neutrophils %                  79.2  H


Segmented Neutrophils           85  H
  
             
             



%
(Manual)


Lymphocytes %                  14.6  L


Lymphocytes % (Manual)           11  L


Monocytes %                      5.5


Monocytes % (Manual)               4


Eosinophils %                    0.0


Basophils %                      0.5


Nucleated Red Blood              0.0


Cells %


Immature Granulocytes #        0.010


Neutrophils #                    3.5


Lymphocytes (Manual)            0.4  L


Lymphocytes #                   0.6  L


Monocytes #                     0.2  L


Monocytes # (Manual)            0.1  L


Eosinophils #                    0.0


Basophils #                      0.0


Nucleated Red Blood              0.0


Cells #


Platelet Estimate         DECREASED


Polychromasia                     1+


Anisocytosis                      1+


Microcytosis                      1+


Sodium Level                     141


Potassium Level                  4.5


Chloride Level                  112  H


Carbon Dioxide Level             17  L


Anion Gap                         12


Blood Urea Nitrogen               17


Creatinine                      0.85


Est Glomerular Filtrat    > 60  
       
             
             



Rate
mL/min


Glucose Level                    211


Hemoglobin A1c                   5.3


Calcium Level                  10.9  H


Total Bilirubin                 1.7  H


Direct Bilirubin                0.00


Indirect Bilirubin              1.7  H


Aspartate Amino                  46  
  
             
             



Transf
(AST/SGOT)


Alanine                          24  
  
             
             



Aminotransferase
(ALT/SG


PT)


Alkaline Phosphatase             110


Total Protein                    7.2


Albumin                          3.7


Globulin                       3.50  H


Albumin/Globulin Ratio          1.05


Salicylates Level         < 1.0  L


Acetaminophen Level       < 10.0  L


Ethyl Alcohol Level       < 10.0  H


POC Venous Lactate                           3.1  *H


Lactic Acid Level                                          3.7  *H


Ammonia                                                                   147  H


Test
                     4/6/19
05:52  4/6/19
08:42  
             



White Blood Count                5.0


Red Blood Count                2.82  L


Hemoglobin                      8.8  L


Hematocrit                     26.2  L


Mean Corpuscular Volume         92.9


Mean Corpuscular                31.2


Hemoglobin


Mean Corpuscular               33.6  
  
             
             



Hemoglobin
Concent


Red Cell Distribution          15.6  H


Width


Platelet Count                   68  L


Mean Platelet Volume           10.5  H


Immature Granulocytes %        0.400


Neutrophils %                   74.7


Lymphocytes %                   17.1


Monocytes %                      7.0


Eosinophils %                    0.4


Basophils %                      0.4


Nucleated Red Blood              0.0


Cells %


Immature Granulocytes #        0.020


Neutrophils #                    3.8


Lymphocytes #                    0.9


Monocytes #                      0.4


Eosinophils #                    0.0


Basophils #                      0.0


Nucleated Red Blood              0.0


Cells #


Sodium Level                    145  H


Potassium Level                  3.8


Chloride Level                  117  H


Carbon Dioxide Level             16  L


Anion Gap                         12


Blood Urea Nitrogen               18


Creatinine                      0.75


Est Glomerular Filtrat    > 60  
       
             
             



Rate
mL/min


Glucose Level                    189


Calcium Level                  10.6  H


Magnesium Level                  1.7


Total Bilirubin                  1.3


Direct Bilirubin                0.00


Indirect Bilirubin              1.3  H


Aspartate Amino                 50  H
  
             
             



Transf
(AST/SGOT)


Alanine                          23  
  
             
             



Aminotransferase
(ALT/SG


PT)


Alkaline Phosphatase              99


Total Protein                    7.0


Albumin                          3.7


Globulin                       3.30  H


Albumin/Globulin Ratio          1.12


Bedside Glucose                               233  H








Subjective


24 Hr Interval Summary


Free Text/Dictation


Patient still confused.  No BM and no urine output noted.  Discussed medications


with daughter and requesting for diamox to be held given patients mentation 


changes following start of med.





Exam/Review of Systems


Exam


Vitals





Vital Signs


  Date      Temp  Pulse  Resp  B/P (MAP)   Pulse Ox  O2          O2 Flow    FiO2


Time                                                 Delivery    Rate


    4/6/19  98.0     98    20      151/75        99  Room Air


     08:00                          (100)








Intake and Output





4/5/19 4/5/19 4/6/19





1515:00


23:00


07:00





IntakeIntake Total


620 ml





BalanceBalance


620 ml











Exam


General: Patient is laying in bed, mild labored breathing. pleasantly confused


Head: Normocephalic atraumatic


Eyes: EOMI, right eye with cover due to recent cataract surgery


Neck: Supple, nontender, midline


Respiratory: Crackles at bases, tachypnea, no wheezing appreciated


Cardiovascular: S1, S2, regular rate and rhythm, no obvious murmurs


Gastrointestinal: soft, non-tender to palpation, nondistended, bowel sounds 


heard. no rebound or guarding


Ext: Moves all extremities spontaneously. no cyanosis, edema, or clubbing


Skin: No new skin lesions.





Results


Results 24hrs





Laboratory Tests


Test
                     4/5/19
15:38  4/5/19
15:39  4/5/19
16:10  4/5/19
16:20


White Blood Count              4.4  #L


Red Blood Count                2.99  L


Hemoglobin                      9.5  L


Hematocrit                     27.8  L


Mean Corpuscular Volume         93.0


Mean Corpuscular                31.8


Hemoglobin


Mean Corpuscular               34.2  
  
             
             



Hemoglobin
Concent


Red Cell Distribution          15.8  H


Width


Platelet Count                   68  L


Mean Platelet Volume            10.4


Immature Granulocytes %        0.200


Neutrophils %                  79.2  H


Segmented Neutrophils           85  H
  
             
             



%
(Manual)


Lymphocytes %                  14.6  L


Lymphocytes % (Manual)           11  L


Monocytes %                      5.5


Monocytes % (Manual)               4


Eosinophils %                    0.0


Basophils %                      0.5


Nucleated Red Blood              0.0


Cells %


Immature Granulocytes #        0.010


Neutrophils #                    3.5


Lymphocytes (Manual)            0.4  L


Lymphocytes #                   0.6  L


Monocytes #                     0.2  L


Monocytes # (Manual)            0.1  L


Eosinophils #                    0.0


Basophils #                      0.0


Nucleated Red Blood              0.0


Cells #


Platelet Estimate         DECREASED


Polychromasia                     1+


Anisocytosis                      1+


Microcytosis                      1+


Sodium Level                     141


Potassium Level                  4.5


Chloride Level                  112  H


Carbon Dioxide Level             17  L


Anion Gap                         12


Blood Urea Nitrogen               17


Creatinine                      0.85


Est Glomerular Filtrat    > 60  
       
             
             



Rate
mL/min


Glucose Level                    211


Hemoglobin A1c                   5.3


Calcium Level                  10.9  H


Total Bilirubin                 1.7  H


Direct Bilirubin                0.00


Indirect Bilirubin              1.7  H


Aspartate Amino                  46  
  
             
             



Transf
(AST/SGOT)


Alanine                          24  
  
             
             



Aminotransferase
(ALT/SG


PT)


Alkaline Phosphatase             110


Total Protein                    7.2


Albumin                          3.7


Globulin                       3.50  H


Albumin/Globulin Ratio          1.05


Salicylates Level         < 1.0  L


Acetaminophen Level       < 10.0  L


Ethyl Alcohol Level       < 10.0  H


POC Venous Lactate                           3.1  *H


Lactic Acid Level                                          3.7  *H


Ammonia                                                                   147  H


Test
                     4/6/19
05:52  4/6/19
08:42  
             



White Blood Count                5.0


Red Blood Count                2.82  L


Hemoglobin                      8.8  L


Hematocrit                     26.2  L


Mean Corpuscular Volume         92.9


Mean Corpuscular                31.2


Hemoglobin


Mean Corpuscular               33.6  
  
             
             



Hemoglobin
Concent


Red Cell Distribution          15.6  H


Width


Platelet Count                   68  L


Mean Platelet Volume           10.5  H


Immature Granulocytes %        0.400


Neutrophils %                   74.7


Lymphocytes %                   17.1


Monocytes %                      7.0


Eosinophils %                    0.4


Basophils %                      0.4


Nucleated Red Blood              0.0


Cells %


Immature Granulocytes #        0.020


Neutrophils #                    3.8


Lymphocytes #                    0.9


Monocytes #                      0.4


Eosinophils #                    0.0


Basophils #                      0.0


Nucleated Red Blood              0.0


Cells #


Sodium Level                    145  H


Potassium Level                  3.8


Chloride Level                  117  H


Carbon Dioxide Level             16  L


Anion Gap                         12


Blood Urea Nitrogen               18


Creatinine                      0.75


Est Glomerular Filtrat    > 60  
       
             
             



Rate
mL/min


Glucose Level                    189


Calcium Level                  10.6  H


Magnesium Level                  1.7


Total Bilirubin                  1.3


Direct Bilirubin                0.00


Indirect Bilirubin              1.3  H


Aspartate Amino                 50  H
  
             
             



Transf
(AST/SGOT)


Alanine                          23  
  
             
             



Aminotransferase
(ALT/SG


PT)


Alkaline Phosphatase              99


Total Protein                    7.0


Albumin                          3.7


Globulin                       3.30  H


Albumin/Globulin Ratio          1.12


Bedside Glucose                               233  H








Medications


Medication





Current Medications


Ferrous Sulfate (Ferrous Sulfate (Ec)) 325 mg DAILY PO  Last administered on 


4/6/19at 08:52; Admin Dose 325 MG;  Start 4/6/19 at 09:00


Furosemide (Lasix) 20 mg DAILY PO  Last administered on 4/6/19at 08:52; Admin 


Dose 20 MG;  Start 4/6/19 at 09:00


Insulin Aspart (Novolog Insulin Pen) 12 unit WITH  MEALS SC  Last administered 


on 4/6/19at 08:44; Admin Dose 12 UNIT;  Start 4/6/19 at 07:35


Insulin Glargine (Lantus) 60 units DAILY@0800 SC  Last administered on 4/6/19at 


08:45; Admin Dose 60 UNITS;  Start 4/6/19 at 08:00


Meclizine HCl (Antivert) 25 mg DAILY  PRN PO DIZZINESS;  Start 4/5/19 at 19:00


Pantoprazole (Protonix Tab) 40 mg AC  BREAKFAST PO  Last administered on 


4/6/19at 08:56; Admin Dose 40 MG;  Start 4/6/19 at 07:30


Rifaximin (Xifaxan) 550 mg DAILY PO  Last administered on 4/6/19at 08:52; Admin 


Dose 550 MG;  Start 4/6/19 at 09:00


Spironolactone (Aldactone) 50 mg DAILY PO  Last administered on 4/6/19at 08:51; 


Admin Dose 50 MG;  Start 4/6/19 at 09:00


Lactulose (Enulose) 20 gm Q8 PO  Last administered on 4/6/19at 06:06; Admin Dose


20 GM;  Start 4/5/19 at 22:00


Acetazolamide (Diamox Sequels) 500 mg BID PO  Last administered on 4/5/19at 


22:02; Admin Dose 500 MG;  Start 4/5/19 at 21:00


Dextrose 1,000 ml @  60 mls/hr [id number] IV ;  Start 4/6/19 at 09:30;  Status UNJASBIR SANDOVAL MD                  Apr 6, 2019 09:13

## 2019-04-07 VITALS — RESPIRATION RATE: 18 BRPM | HEART RATE: 79 BPM | SYSTOLIC BLOOD PRESSURE: 136 MMHG | DIASTOLIC BLOOD PRESSURE: 59 MMHG

## 2019-04-07 VITALS — HEART RATE: 88 BPM | DIASTOLIC BLOOD PRESSURE: 56 MMHG | SYSTOLIC BLOOD PRESSURE: 120 MMHG | RESPIRATION RATE: 18 BRPM

## 2019-04-07 VITALS — HEART RATE: 79 BPM | DIASTOLIC BLOOD PRESSURE: 55 MMHG | RESPIRATION RATE: 17 BRPM | SYSTOLIC BLOOD PRESSURE: 117 MMHG

## 2019-04-07 VITALS — DIASTOLIC BLOOD PRESSURE: 53 MMHG | RESPIRATION RATE: 17 BRPM | HEART RATE: 77 BPM | SYSTOLIC BLOOD PRESSURE: 106 MMHG

## 2019-04-07 LAB
ABNORMAL IP MESSAGE: 1
ADD MAN DIFF?: NO
ADD UMIC: YES
ALANINE AMINOTRANSFERASE: 33 IU/L (ref 13–69)
ALBUMIN/GLOBULIN RATIO: 1.06
ALBUMIN: 3.1 G/DL (ref 3.3–4.9)
ALKALINE PHOSPHATASE: 82 IU/L (ref 42–121)
AMMONIA: 102 UMOL/L (ref 9–30)
ANION GAP: 9 (ref 5–13)
ASPARTATE AMINO TRANSFERASE: 45 IU/L (ref 15–46)
BASOPHIL #: 0 10^3/UL (ref 0–0.1)
BASOPHILS %: 0.4 % (ref 0–2)
BILIRUBIN,DIRECT: 0 MG/DL (ref 0–0.2)
BILIRUBIN,TOTAL: 1.5 MG/DL (ref 0.2–1.3)
BLOOD UREA NITROGEN: 15 MG/DL (ref 7–20)
CALCIUM: 10.7 MG/DL (ref 8.4–10.2)
CARBON DIOXIDE: 16 MMOL/L (ref 21–31)
CHLORIDE: 119 MMOL/L (ref 97–110)
CREATININE: 0.7 MG/DL (ref 0.44–1)
EOSINOPHILS #: 0.1 10^3/UL (ref 0–0.5)
EOSINOPHILS %: 2.3 % (ref 0–7)
GLOBULIN: 2.9 G/DL (ref 1.3–3.2)
GLUCOSE: 183 MG/DL (ref 70–220)
HEMATOCRIT: 23.5 % (ref 37–47)
HEMOGLOBIN: 8.1 G/DL (ref 12–16)
IMMATURE GRANS #M: 0.02 10^3/UL (ref 0–0.03)
IMMATURE GRANS % (M): 0.4 % (ref 0–0.43)
LACTIC ACID: 1.8 MMOL/L (ref 0.5–2)
LYMPHOCYTES #: 1.3 10^3/UL (ref 0.8–2.9)
LYMPHOCYTES %: 25.9 % (ref 15–51)
MAGNESIUM: 1.7 MG/DL (ref 1.7–2.5)
MEAN CORPUSCULAR HEMOGLOBIN: 31.2 PG (ref 29–33)
MEAN CORPUSCULAR HGB CONC: 34.5 G/DL (ref 32–37)
MEAN CORPUSCULAR VOLUME: 90.4 FL (ref 82–101)
MEAN PLATELET VOLUME: 10.5 FL (ref 7.4–10.4)
MONOCYTE #: 0.4 10^3/UL (ref 0.3–0.9)
MONOCYTES %: 8.1 % (ref 0–11)
NEUTROPHIL #: 3 10^3/UL (ref 1.6–7.5)
NEUTROPHILS %: 62.9 % (ref 39–77)
NUCLEATED RED BLOOD CELLS #: 0 10^3/UL (ref 0–0)
NUCLEATED RED BLOOD CELLS%: 0 /100WBC (ref 0–0)
OCCULT BLOOD STOOL: POSITIVE
PLATELET COUNT: 60 10^3/UL (ref 140–415)
POSITIVE DIFF: (no result)
POTASSIUM: 3.1 MMOL/L (ref 3.5–5.1)
RED BLOOD COUNT: 2.6 10^6/UL (ref 4.2–5.4)
RED CELL DISTRIBUTION WIDTH: 15.4 % (ref 11.5–14.5)
SODIUM: 144 MMOL/L (ref 135–144)
TOTAL PROTEIN: 6 G/DL (ref 6.1–8.1)
UR ASCORBIC ACID: NEGATIVE MG/DL
UR BILIRUBIN (DIP): NEGATIVE MG/DL
UR BLOOD (DIP): (no result) MG/DL
UR CLARITY: (no result)
UR COLOR: (no result)
UR GLUCOSE (DIP): NEGATIVE MG/DL
UR KETONES (DIP): NEGATIVE MG/DL
UR LEUKOCYTE ESTERASE (DIP): (no result) LEU/UL
UR NITRITE (DIP): NEGATIVE MG/DL
UR PH (DIP): 7 (ref 5–9)
UR RBC: > 182 /HPF (ref 0–5)
UR SPECIFIC GRAVITY (DIP): 1.01 (ref 1–1.03)
UR TOTAL PROTEIN (DIP): (no result) MG/DL
UR UROBILINOGEN (DIP): (no result) MG/DL
UR WBC: 54 /HPF (ref 0–5)
WHITE BLOOD COUNT: 4.8 10^3/UL (ref 4.8–10.8)

## 2019-04-07 RX ADMIN — INSULIN ASPART 1 UNIT: 100 INJECTION, SOLUTION INTRAVENOUS; SUBCUTANEOUS at 11:30

## 2019-04-07 RX ADMIN — DEXTROSE, SODIUM CHLORIDE, AND POTASSIUM CHLORIDE 1 MLS/HR: 5; .45; .15 INJECTION INTRAVENOUS at 11:45

## 2019-04-07 RX ADMIN — LACTULOSE SCH GM: 10 SOLUTION ORAL at 06:43

## 2019-04-07 RX ADMIN — LACTULOSE 1 GM: 20 SOLUTION ORAL at 00:49

## 2019-04-07 RX ADMIN — RIFAXIMIN 1 MG: 550 TABLET ORAL at 09:00

## 2019-04-07 RX ADMIN — FUROSEMIDE 1 MG: 20 TABLET ORAL at 09:00

## 2019-04-07 RX ADMIN — POTASSIUM CHLORIDE 1 MLS/HR: 200 INJECTION, SOLUTION INTRAVENOUS at 14:53

## 2019-04-07 RX ADMIN — RIFAXIMIN SCH MG: 550 TABLET ORAL at 09:00

## 2019-04-07 RX ADMIN — INSULIN GLARGINE SCH UNITS: 100 INJECTION, SOLUTION SUBCUTANEOUS at 08:00

## 2019-04-07 RX ADMIN — LACTULOSE 1 GM: 20 SOLUTION ORAL at 13:28

## 2019-04-07 RX ADMIN — LACTULOSE SCH GM: 10 SOLUTION ORAL at 11:47

## 2019-04-07 RX ADMIN — LACTULOSE 1 GM: 20 SOLUTION ORAL at 06:43

## 2019-04-07 RX ADMIN — PANTOPRAZOLE SODIUM 1 MG: 40 TABLET, DELAYED RELEASE ORAL at 07:30

## 2019-04-07 RX ADMIN — SPIRONOLACTONE 1 MG: 50 TABLET, FILM COATED ORAL at 09:00

## 2019-04-07 RX ADMIN — POTASSIUM CHLORIDE SCH MLS/HR: 2 INJECTION, SOLUTION, CONCENTRATE INTRAVENOUS at 02:10

## 2019-04-07 RX ADMIN — MAGNESIUM SULFATE HEPTAHYDRATE 1 MLS/HR: 40 INJECTION, SOLUTION INTRAVENOUS at 11:46

## 2019-04-07 RX ADMIN — LACTULOSE SCH GM: 10 SOLUTION ORAL at 00:49

## 2019-04-07 RX ADMIN — LACTULOSE 1 GM: 20 SOLUTION ORAL at 11:47

## 2019-04-07 RX ADMIN — FERROUS SULFATE TAB 325 MG (65 MG ELEMENTAL FE) 1 MG: 325 (65 FE) TAB at 09:00

## 2019-04-07 RX ADMIN — POTASSIUM CHLORIDE SCH MLS/HR: 200 INJECTION, SOLUTION INTRAVENOUS at 17:16

## 2019-04-07 RX ADMIN — ASCORBIC ACID, VITAMIN A PALMITATE, CHOLECALCIFEROL, THIAMINE HYDROCHLORIDE, RIBOFLAVIN-5 PHOSPHATE SODIUM, PYRIDOXINE HYDROCHLORIDE, NIACINAMIDE, DEXPANTHENOL, ALPHA-TOCOPHEROL ACETATE, VITAMIN K1, FOLIC ACID, BIOTIN, CYANOCOBALAMIN 1 MLS/HR: 200; 3300; 200; 6; 3.6; 6; 40; 15; 10; 150; 600; 60; 5 INJECTION, SOLUTION INTRAVENOUS at 06:44

## 2019-04-07 RX ADMIN — LACTULOSE SCH GM: 10 SOLUTION ORAL at 17:42

## 2019-04-07 RX ADMIN — POTASSIUM CHLORIDE 1 MLS/HR: 200 INJECTION, SOLUTION INTRAVENOUS at 17:16

## 2019-04-07 RX ADMIN — FERROUS SULFATE TAB 325 MG (65 MG ELEMENTAL FE) SCH MG: 325 (65 FE) TAB at 09:00

## 2019-04-07 RX ADMIN — POTASSIUM CHLORIDE SCH MLS/HR: 2 INJECTION, SOLUTION, CONCENTRATE INTRAVENOUS at 06:44

## 2019-04-07 RX ADMIN — INSULIN ASPART 1 UNIT: 100 INJECTION, SOLUTION INTRAVENOUS; SUBCUTANEOUS at 07:35

## 2019-04-07 RX ADMIN — DEXTROSE, SODIUM CHLORIDE, AND POTASSIUM CHLORIDE SCH MLS/HR: 5; .45; .15 INJECTION INTRAVENOUS at 11:45

## 2019-04-07 RX ADMIN — LACTULOSE 1 GM: 20 SOLUTION ORAL at 17:42

## 2019-04-07 RX ADMIN — POTASSIUM CHLORIDE SCH MLS/HR: 200 INJECTION, SOLUTION INTRAVENOUS at 14:53

## 2019-04-07 RX ADMIN — PANTOPRAZOLE SODIUM SCH MG: 40 TABLET, DELAYED RELEASE ORAL at 07:30

## 2019-04-07 RX ADMIN — INSULIN GLARGINE 1 UNITS: 100 INJECTION, SOLUTION SUBCUTANEOUS at 08:00

## 2019-04-07 RX ADMIN — LACTULOSE SCH GM: 10 SOLUTION ORAL at 13:28

## 2019-04-07 RX ADMIN — CEFTRIAXONE SCH MLS/HR: 1 INJECTION, SOLUTION INTRAVENOUS at 21:17

## 2019-04-07 RX ADMIN — INSULIN ASPART 1 UNIT: 100 INJECTION, SOLUTION INTRAVENOUS; SUBCUTANEOUS at 17:50

## 2019-04-07 RX ADMIN — CEFTRIAXONE 1 MLS/HR: 1 INJECTION, SOLUTION INTRAVENOUS at 21:17

## 2019-04-07 RX ADMIN — SPIRONOLACTONE SCH MG: 50 TABLET ORAL at 09:00

## 2019-04-07 RX ADMIN — ASCORBIC ACID, VITAMIN A PALMITATE, CHOLECALCIFEROL, THIAMINE HYDROCHLORIDE, RIBOFLAVIN-5 PHOSPHATE SODIUM, PYRIDOXINE HYDROCHLORIDE, NIACINAMIDE, DEXPANTHENOL, ALPHA-TOCOPHEROL ACETATE, VITAMIN K1, FOLIC ACID, BIOTIN, CYANOCOBALAMIN 1 MLS/HR: 200; 3300; 200; 6; 3.6; 6; 40; 15; 10; 150; 600; 60; 5 INJECTION, SOLUTION INTRAVENOUS at 02:10

## 2019-04-07 NOTE — PN
Date/Time of Note


Date/Time of Note


DATE: 4/7/19 


TIME: 09:12





Assessment/Plan


VTE Prophylaxis


Risk score (from Ns)>0 risk:  3


SCD applied (from Ns):  Yes


Pharmacological prophylaxis:  NA/contraindicated


Pharm contraindication:  liver dx





Lines/Catheters


IV Catheter Type (from [facility]):  Peripheral IV


Urinary Cath still in place:  Yes


Reason Cath still needed:  urinary retention





Assessment/Plan


Assessment/Plan


1.  Acute hepatic encephalopathy


- Patient still with confusion and continue on Lactulose and Rifaximin.  Finally


with BMs last night and will monitor frequency.  Ammonia level trending downward





- CT head performed with no acute abnormalities





2.  Urinary retention


- possible UTI seen on UA.  Awaiting urine cultures


- may be contributing to AMS


- hawk in place





3.  DM


- A1c noted.  No need for ISS at this time


- holding Metformin and Insulin since patient has poor PO intake.  May not need 


to be on such high doses of Lantus given A1c is 5.3





4.  Hypercalcemia


- possibly due to dehydration. continue gentle fluids





5.   Hyperammonemia- improving


- 102 today


- continue on lactulose and rifaximin. Increasing lactulose until passing BM and


then will titrate for 3-4 BM a day





6. h/o Glaucoma


- continue home eye drops


- hold off on Diamox for now given patients confusion started after medication.





7.  Disposition


- Will adjust Lactulose dose for 3-4 BMs daily and monitor for improvement in 


mentation


Result Diagram:  


4/7/19 0541                                                                     


          4/7/19 0541





Results 24hrs





Laboratory Tests


Test
                     4/6/19
10:12  4/6/19
12:00  4/6/19
13:41  4/6/19
18:03


Ammonia                         111  H


Bedside Glucose                                134                         160


Lactic Acid Level                                          2.3  *H


Test
                     4/7/19
05:00  4/7/19
05:41  4/7/19
08:14  



Urine Color         TEE


Urine Clarity
      SLIGHTLY
CLOUDY  A  
             
             



Urine pH                         7.0


Urine Specific                 1.014


Gravity


Urine Ketones       NEGATIVE


Urine Nitrite       NEGATIVE


Urine Bilirubin     NEGATIVE


Urine Urobilinogen               2+  H


Urine Leukocyte     TRACE  A


Esterase


Urine Microscopic   > 182  H


RBC


Urine Microscopic                54  H


WBC


Urine Hemoglobin                 3+  H


Urine Glucose       NEGATIVE


Urine Total                      1+  H


Protein


Urine Opiates       Negative


Screen


Urine Barbiturates  Negative


Urine Amphetamines  Negative


Screen


Urine               Negative


Benzodiazepines


Screen


Urine Cocaine       Negative


Screen


Urine Cannabinoids  Negative


White Blood Count                              4.8


Red Blood Count                              2.60  L


Hemoglobin                                    8.1  L


Hematocrit                                   23.5  L


Mean Corpuscular                              90.4


Volume


Mean Corpuscular                              31.2


Hemoglobin


Mean Corpuscular    
                        34.5  
  
             



Hemoglobin
Concent


Red Cell                                     15.4  H


Distribution Width


Platelet Count                                 60  L


Mean Platelet                                10.5  H


Volume


Immature                                     0.400


Granulocytes %


Neutrophils %                                 62.9


Lymphocytes %                                 25.9


Monocytes %                                    8.1


Eosinophils %                                  2.3


Basophils %                                    0.4


Nucleated Red                                  0.0


Blood Cells %


Immature                                     0.020


Granulocytes #


Neutrophils #                                  3.0


Lymphocytes #                                  1.3


Monocytes #                                    0.4


Eosinophils #                                  0.1


Basophils #                                    0.0


Nucleated Red                                  0.0


Blood Cells #


Sodium Level                                   144


Potassium Level                               3.1  L


Chloride Level                                119  H


Carbon Dioxide                                 16  L


Level


Anion Gap                                        9


Blood Urea                                      15


Nitrogen


Creatinine                                    0.70


Est Glomerular      
                   > 60  
       
             



Filtrat


Rate
mL/min


Glucose Level                                  183


Lactic Acid Level                              1.8


Calcium Level                                10.7  H


Magnesium Level                                1.7


Total Bilirubin                               1.5  H


Direct Bilirubin                              0.00


Indirect Bilirubin                            1.5  H


Aspartate Amino     
                          45  
  
             



Transf
(AST/SGOT)


Alanine             
                          33  
  
             



Aminotransferase
(


ALT/SGPT)


Alkaline                                        82


Phosphatase


Total Protein                                6.0  #L


Albumin                                       3.1  L


Globulin                                      2.90


Albumin/Globulin                              1.06


Ratio


Bedside Glucose                                              203








Subjective


24 Hr Interval Summary


Free Text/Dictation


Patient is resting comfortable.  Was agitated since admission and has not been 


sleeping well.  No acute distress.  Poor PO intake at this time.





Exam/Review of Systems


Exam


Vitals





Vital Signs


  Date      Temp  Pulse  Resp  B/P (MAP)   Pulse Ox  O2          O2 Flow    FiO2


Time                                                 Delivery    Rate


    4/7/19  97.8     79    17      117/55        97  Room Air


     08:16                           (75)








Intake and Output





4/6/19 4/6/19 4/7/19





1515:00


23:00


07:00





IntakeIntake Total


180 ml


340 ml


760 ml





OutputOutput Total


1500 ml


400 ml


400 ml





BalanceBalance


-1320 ml


-60 ml


360 ml











Exam


General: Patient is laying in bed, sleeping


Eyes: EOMI, right eye with cover due to recent cataract surgery


Neck: Supple, nontender, midline


Respiratory: Diminished at bases, tachypnea, no wheezing appreciated


Cardiovascular: S1, S2, regular rate and rhythm, no obvious murmurs


Gastrointestinal: soft, non-tender to palpation, nondistended, bowel sounds 


heard. no rebound or guarding


Ext: Moves all extremities spontaneously. no cyanosis, edema, or clubbing


Skin: No new skin lesions.





Results


Results 24hrs





Laboratory Tests


Test
                     4/6/19
10:12  4/6/19
12:00  4/6/19
13:41  4/6/19
18:03


Ammonia                         111  H


Bedside Glucose                                134                         160


Lactic Acid Level                                          2.3  *H


Test
                     4/7/19
05:00  4/7/19
05:41  4/7/19
08:14  



Urine Color         TEE


Urine Clarity
      SLIGHTLY
CLOUDY  A  
             
             



Urine pH                         7.0


Urine Specific                 1.014


Gravity


Urine Ketones       NEGATIVE


Urine Nitrite       NEGATIVE


Urine Bilirubin     NEGATIVE


Urine Urobilinogen               2+  H


Urine Leukocyte     TRACE  A


Esterase


Urine Microscopic   > 182  H


RBC


Urine Microscopic                54  H


WBC


Urine Hemoglobin                 3+  H


Urine Glucose       NEGATIVE


Urine Total                      1+  H


Protein


Urine Opiates       Negative


Screen


Urine Barbiturates  Negative


Urine Amphetamines  Negative


Screen


Urine               Negative


Benzodiazepines


Screen


Urine Cocaine       Negative


Screen


Urine Cannabinoids  Negative


White Blood Count                              4.8


Red Blood Count                              2.60  L


Hemoglobin                                    8.1  L


Hematocrit                                   23.5  L


Mean Corpuscular                              90.4


Volume


Mean Corpuscular                              31.2


Hemoglobin


Mean Corpuscular    
                        34.5  
  
             



Hemoglobin
Concent


Red Cell                                     15.4  H


Distribution Width


Platelet Count                                 60  L


Mean Platelet                                10.5  H


Volume


Immature                                     0.400


Granulocytes %


Neutrophils %                                 62.9


Lymphocytes %                                 25.9


Monocytes %                                    8.1


Eosinophils %                                  2.3


Basophils %                                    0.4


Nucleated Red                                  0.0


Blood Cells %


Immature                                     0.020


Granulocytes #


Neutrophils #                                  3.0


Lymphocytes #                                  1.3


Monocytes #                                    0.4


Eosinophils #                                  0.1


Basophils #                                    0.0


Nucleated Red                                  0.0


Blood Cells #


Sodium Level                                   144


Potassium Level                               3.1  L


Chloride Level                                119  H


Carbon Dioxide                                 16  L


Level


Anion Gap                                        9


Blood Urea                                      15


Nitrogen


Creatinine                                    0.70


Est Glomerular      
                   > 60  
       
             



Filtrat


Rate
mL/min


Glucose Level                                  183


Lactic Acid Level                              1.8


Calcium Level                                10.7  H


Magnesium Level                                1.7


Total Bilirubin                               1.5  H


Direct Bilirubin                              0.00


Indirect Bilirubin                            1.5  H


Aspartate Amino     
                          45  
  
             



Transf
(AST/SGOT)


Alanine             
                          33  
  
             



Aminotransferase
(


ALT/SGPT)


Alkaline                                        82


Phosphatase


Total Protein                                6.0  #L


Albumin                                       3.1  L


Globulin                                      2.90


Albumin/Globulin                              1.06


Ratio


Bedside Glucose                                              203








Medications


Medication





Current Medications


Ferrous Sulfate (Ferrous Sulfate (Ec)) 325 mg DAILY PO  Last administered on 


4/6/19at 08:52; Admin Dose 325 MG;  Start 4/6/19 at 09:00


Furosemide (Lasix) 20 mg DAILY PO  Last administered on 4/6/19at 08:52; Admin 


Dose 20 MG;  Start 4/6/19 at 09:00


Insulin Aspart (Novolog Insulin Pen) 12 unit WITH  MEALS SC  Last administered 


on 4/6/19at 08:44; Admin Dose 12 UNIT;  Start 4/6/19 at 07:35


Insulin Glargine (Lantus) 60 units DAILY@0800 SC  Last administered on 4/6/19at 


08:45; Admin Dose 60 UNITS;  Start 4/6/19 at 08:00


Meclizine HCl (Antivert) 25 mg DAILY  PRN PO DIZZINESS;  Start 4/5/19 at 19:00


Pantoprazole (Protonix Tab) 40 mg AC  BREAKFAST PO  Last administered on 


4/6/19at 08:56; Admin Dose 40 MG;  Start 4/6/19 at 07:30


Rifaximin (Xifaxan) 550 mg DAILY PO  Last administered on 4/6/19at 08:52; Admin 


Dose 550 MG;  Start 4/6/19 at 09:00


Spironolactone (Aldactone) 50 mg DAILY PO  Last administered on 4/6/19at 08:51; 


Admin Dose 50 MG;  Start 4/6/19 at 09:00


Acetazolamide (Diamox Sequels) 500 mg BID PO  Last administered on 4/6/19at 


10:47; Admin Dose 500 MG;  Start 4/5/19 at 21:00;  Status Hold


Dextrose 1,000 ml @  60 mls/hr [id number] IV  Last administered on 4/7/19at 06:44; 


Admin Dose 60 MLS/HR;  Start 4/6/19 at 09:30


Patient Own Medication 1 ea QID RIGHT EYE  Last administered on 4/6/19at 20:47; 


Admin Dose 1 EA;  Start 4/6/19 at 17:00


Patient Own Medication 1 ea DAILY RIGHT EYE ;  Start 4/7/19 at 09:00


Patient Own Medication 1 ea Q4 RIGHT EYE  Last administered on 4/7/19at 05:48; 


Admin Dose 1 EA;  Start 4/6/19 at 17:00


Lactulose (Enulose) 20 gm Q6 PO  Last administered on 4/7/19at 06:43; Admin Dose


20 GM;  Start 4/6/19 at 18:00











JASBIR BRYANT MD                  Apr 7, 2019 09:12

## 2019-04-08 VITALS — DIASTOLIC BLOOD PRESSURE: 60 MMHG | RESPIRATION RATE: 18 BRPM | SYSTOLIC BLOOD PRESSURE: 136 MMHG | HEART RATE: 86 BPM

## 2019-04-08 VITALS — DIASTOLIC BLOOD PRESSURE: 53 MMHG | SYSTOLIC BLOOD PRESSURE: 117 MMHG | HEART RATE: 92 BPM | RESPIRATION RATE: 20 BRPM

## 2019-04-08 VITALS — RESPIRATION RATE: 18 BRPM | SYSTOLIC BLOOD PRESSURE: 95 MMHG | HEART RATE: 60 BPM | DIASTOLIC BLOOD PRESSURE: 42 MMHG

## 2019-04-08 VITALS — DIASTOLIC BLOOD PRESSURE: 63 MMHG | SYSTOLIC BLOOD PRESSURE: 132 MMHG | HEART RATE: 83 BPM | RESPIRATION RATE: 18 BRPM

## 2019-04-08 LAB
ABNORMAL IP MESSAGE: 1
ADD MAN DIFF?: NO
ALANINE AMINOTRANSFERASE: 30 IU/L (ref 13–69)
ALBUMIN/GLOBULIN RATIO: 1
ALBUMIN: 3.1 G/DL (ref 3.3–4.9)
ALKALINE PHOSPHATASE: 79 IU/L (ref 42–121)
ANION GAP: 12 (ref 5–13)
ASPARTATE AMINO TRANSFERASE: 52 IU/L (ref 15–46)
BASOPHIL #: 0 10^3/UL (ref 0–0.1)
BASOPHILS %: 0.8 % (ref 0–2)
BILIRUBIN,DIRECT: 0 MG/DL (ref 0–0.2)
BILIRUBIN,TOTAL: 1.4 MG/DL (ref 0.2–1.3)
BLOOD UREA NITROGEN: 9 MG/DL (ref 7–20)
CALCIUM: 10.1 MG/DL (ref 8.4–10.2)
CARBON DIOXIDE: 16 MMOL/L (ref 21–31)
CHLORIDE: 118 MMOL/L (ref 97–110)
CREATININE: 0.68 MG/DL (ref 0.44–1)
EOSINOPHILS #: 0.2 10^3/UL (ref 0–0.5)
EOSINOPHILS %: 4.4 % (ref 0–7)
GLOBULIN: 3.1 G/DL (ref 1.3–3.2)
GLUCOSE: 143 MG/DL (ref 70–220)
HEMATOCRIT: 24.9 % (ref 37–47)
HEMOGLOBIN: 8.5 G/DL (ref 12–16)
IMMATURE GRANS #M: 0.02 10^3/UL (ref 0–0.03)
IMMATURE GRANS % (M): 0.4 % (ref 0–0.43)
LYMPHOCYTES #: 1.7 10^3/UL (ref 0.8–2.9)
LYMPHOCYTES %: 33.3 % (ref 15–51)
MAGNESIUM: 2 MG/DL (ref 1.7–2.5)
MEAN CORPUSCULAR HEMOGLOBIN: 31.1 PG (ref 29–33)
MEAN CORPUSCULAR HGB CONC: 34.1 G/DL (ref 32–37)
MEAN CORPUSCULAR VOLUME: 91.2 FL (ref 82–101)
MEAN PLATELET VOLUME: 10.2 FL (ref 7.4–10.4)
MONOCYTE #: 0.7 10^3/UL (ref 0.3–0.9)
MONOCYTES %: 13.7 % (ref 0–11)
NEUTROPHIL #: 2.5 10^3/UL (ref 1.6–7.5)
NEUTROPHILS %: 47.4 % (ref 39–77)
NUCLEATED RED BLOOD CELLS #: 0 10^3/UL (ref 0–0)
NUCLEATED RED BLOOD CELLS%: 0 /100WBC (ref 0–0)
PLATELET COUNT: 62 10^3/UL (ref 140–415)
POSITIVE DIFF: (no result)
POTASSIUM: 3.6 MMOL/L (ref 3.5–5.1)
RED BLOOD COUNT: 2.73 10^6/UL (ref 4.2–5.4)
RED CELL DISTRIBUTION WIDTH: 15.9 % (ref 11.5–14.5)
SODIUM: 146 MMOL/L (ref 135–144)
TOTAL PROTEIN: 6.2 G/DL (ref 6.1–8.1)
WHITE BLOOD COUNT: 5.2 10^3/UL (ref 4.8–10.8)

## 2019-04-08 RX ADMIN — LACTULOSE SCH GM: 10 SOLUTION ORAL at 05:10

## 2019-04-08 RX ADMIN — DEXTROSE, SODIUM CHLORIDE, AND POTASSIUM CHLORIDE SCH MLS/HR: 5; .45; .15 INJECTION INTRAVENOUS at 02:10

## 2019-04-08 RX ADMIN — LACTULOSE 1 GM: 20 SOLUTION ORAL at 18:13

## 2019-04-08 RX ADMIN — SPIRONOLACTONE 1 MG: 50 TABLET, FILM COATED ORAL at 09:11

## 2019-04-08 RX ADMIN — LACTULOSE SCH GM: 10 SOLUTION ORAL at 18:13

## 2019-04-08 RX ADMIN — CEFTRIAXONE SCH MLS/HR: 1 INJECTION, SOLUTION INTRAVENOUS at 21:02

## 2019-04-08 RX ADMIN — INSULIN GLARGINE 1 UNITS: 100 INJECTION, SOLUTION SUBCUTANEOUS at 09:24

## 2019-04-08 RX ADMIN — INSULIN ASPART 1 UNIT: 100 INJECTION, SOLUTION INTRAVENOUS; SUBCUTANEOUS at 09:16

## 2019-04-08 RX ADMIN — LACTULOSE 1 GM: 20 SOLUTION ORAL at 00:30

## 2019-04-08 RX ADMIN — DEXTROSE, SODIUM CHLORIDE, AND POTASSIUM CHLORIDE 1 MLS/HR: 5; .45; .15 INJECTION INTRAVENOUS at 12:18

## 2019-04-08 RX ADMIN — INSULIN ASPART 1 UNIT: 100 INJECTION, SOLUTION INTRAVENOUS; SUBCUTANEOUS at 18:11

## 2019-04-08 RX ADMIN — LACTULOSE 1 GM: 20 SOLUTION ORAL at 12:54

## 2019-04-08 RX ADMIN — CEFTRIAXONE 1 MLS/HR: 1 INJECTION, SOLUTION INTRAVENOUS at 21:02

## 2019-04-08 RX ADMIN — RIFAXIMIN SCH MG: 550 TABLET ORAL at 09:11

## 2019-04-08 RX ADMIN — INSULIN GLARGINE SCH UNITS: 100 INJECTION, SOLUTION SUBCUTANEOUS at 09:24

## 2019-04-08 RX ADMIN — RIFAXIMIN 1 MG: 550 TABLET ORAL at 09:11

## 2019-04-08 RX ADMIN — LACTULOSE 1 GM: 20 SOLUTION ORAL at 05:10

## 2019-04-08 RX ADMIN — LACTULOSE SCH GM: 10 SOLUTION ORAL at 00:30

## 2019-04-08 RX ADMIN — INSULIN ASPART 1 UNIT: 100 INJECTION, SOLUTION INTRAVENOUS; SUBCUTANEOUS at 12:51

## 2019-04-08 RX ADMIN — DEXTROSE, SODIUM CHLORIDE, AND POTASSIUM CHLORIDE 1 MLS/HR: 5; .45; .15 INJECTION INTRAVENOUS at 02:10

## 2019-04-08 RX ADMIN — SPIRONOLACTONE SCH MG: 50 TABLET ORAL at 09:11

## 2019-04-08 RX ADMIN — PANTOPRAZOLE SODIUM SCH MG: 40 TABLET, DELAYED RELEASE ORAL at 09:21

## 2019-04-08 RX ADMIN — FERROUS SULFATE TAB 325 MG (65 MG ELEMENTAL FE) SCH MG: 325 (65 FE) TAB at 09:11

## 2019-04-08 RX ADMIN — PANTOPRAZOLE SODIUM 1 MG: 40 TABLET, DELAYED RELEASE ORAL at 09:21

## 2019-04-08 RX ADMIN — FUROSEMIDE 1 MG: 20 TABLET ORAL at 09:12

## 2019-04-08 RX ADMIN — FERROUS SULFATE TAB 325 MG (65 MG ELEMENTAL FE) 1 MG: 325 (65 FE) TAB at 09:11

## 2019-04-08 RX ADMIN — DEXTROSE, SODIUM CHLORIDE, AND POTASSIUM CHLORIDE SCH MLS/HR: 5; .45; .15 INJECTION INTRAVENOUS at 12:18

## 2019-04-08 RX ADMIN — LACTULOSE SCH GM: 10 SOLUTION ORAL at 12:54

## 2019-04-08 NOTE — PN
Date/Time of Note


Date/Time of Note


DATE: 4/8/19 


TIME: 19:07





Objective


Vitals





Vital Signs


  Date      Temp  Pulse  Resp  B/P (MAP)   Pulse Ox  O2          O2 Flow    FiO2


Time                                                 Delivery    Rate


    4/8/19  98.3     60    18  95/42 (59)        99  Room Air


     14:16








Intake and Output





4/7/19 4/7/19 4/8/19





1515:00


23:00


07:00





IntakeIntake Total


290 ml


430 ml


125 ml





OutputOutput Total


400 ml





BalanceBalance


290 ml


30 ml


125 ml














Results


Result Diagram:  


4/8/19 0542                                                                     


          4/8/19 0542








Medications


Medications





Current Medications


Ferrous Sulfate (Ferrous Sulfate (Ec)) 325 mg DAILY PO  Last administered on 


4/8/19at 09:11; Admin Dose 325 MG;  Start 4/6/19 at 09:00


Furosemide (Lasix) 20 mg DAILY PO  Last administered on 4/8/19at 09:12; Admin 


Dose 20 MG;  Start 4/6/19 at 09:00


Insulin Aspart (Novolog Insulin Pen) 12 unit WITH  MEALS SC  Last administered 


on 4/8/19at 18:11; Admin Dose 12 UNIT;  Start 4/6/19 at 07:35


Insulin Glargine (Lantus) 60 units DAILY@0800 SC  Last administered on 4/8/19at 


09:24; Admin Dose 60 UNITS;  Start 4/6/19 at 08:00


Meclizine HCl (Antivert) 25 mg DAILY  PRN PO DIZZINESS;  Start 4/5/19 at 19:00


Pantoprazole (Protonix Tab) 40 mg AC  BREAKFAST PO  Last administered on 


4/8/19at 09:21; Admin Dose 40 MG;  Start 4/6/19 at 07:30


Rifaximin (Xifaxan) 550 mg DAILY PO  Last administered on 4/8/19at 09:11; Admin 


Dose 550 MG;  Start 4/6/19 at 09:00


Spironolactone (Aldactone) 50 mg DAILY PO  Last administered on 4/8/19at 09:11; 


Admin Dose 50 MG;  Start 4/6/19 at 09:00


Acetazolamide (Diamox Sequels) 500 mg BID PO  Last administered on 4/6/19at 


10:47; Admin Dose 500 MG;  Start 4/5/19 at 21:00;  Status Hold


Patient Own Medication 1 ea QID RIGHT EYE  Last administered on 4/8/19at 17:31; 


Admin Dose 1 EA;  Start 4/6/19 at 17:00


Patient Own Medication 1 ea DAILY RIGHT EYE  Last administered on 4/8/19at 


09:13; Admin Dose 1 EA;  Start 4/7/19 at 09:00


Patient Own Medication 1 ea Q4 RIGHT EYE  Last administered on 4/8/19at 17:31; 


Admin Dose 1 EA;  Start 4/6/19 at 17:00


Lactulose (Enulose) 20 gm Q6 PO  Last administered on 4/8/19at 18:13; Admin Dose


20 GM;  Start 4/6/19 at 18:00


Miscellaneous Information 1 ea NOTE XX ;  Start 4/7/19 at 09:30


Glucose (Glutose) 15 gm Q15M  PRN PO DECREASED GLUCOSE;  Start 4/7/19 at 09:30


Glucose (Glutose) 22.5 gm Q15M  PRN PO DECREASED GLUCOSE;  Start 4/7/19 at 09:30


Dextrose (D50w Syringe) 25 ml Q15M  PRN IV DECREASED GLUCOSE;  Start 4/7/19 at 


09:30


Dextrose (D50w Syringe) 50 ml Q15M  PRN IV DECREASED GLUCOSE;  Start 4/7/19 at 


09:30


Glucagon (Glucagen) 1 mg Q15M  PRN IM DECREASED GLUCOSE;  Start 4/7/19 at 09:30


Glucose (Glutose) 15 gm Q15M  PRN BUCCAL DECREASED GLUCOSE;  Start 4/7/19 at 


09:30


Ceftriaxone Sodium 50 ml @  100 mls/hr Q24H IVPB  Last administered on 4/7/19at 


21:17; Admin Dose 100 MLS/HR;  Start 4/7/19 at 21:00





VTE Prophylaxis


Risk score (from Nsg)>0 risk:  4


SCD applied (from Nsg):  Yes





Lines/Catheters


IV Catheter Type:  


Hawk in Place:  No





Assessment/Plan


Hospital Course


Subjective


Per nursing staff patient's mentation is greatly improved since yesterday, 


patient able to respond to questions although there is a slight language 


barrier.





Objective





Physical exam





General: Patient is laying in bed and answers questions appropriately


Mentation: Patient is alert and oriented to self and place and mildly to 


situation


Head: Normocephalic atraumatic


Eyes: EOMI, pupils reactive to light


Neck: Supple, nontender, midline


Respiratory: Clear to auscultation bilaterally


Cardiovascular: regular rate, no obvious murmurs


Gastrointestinal: non-tender to palpation, bowel sounds heard.


Neurological: Moves all extremities spontaneously


Skin: No new skin lesions





Assessment/Plan


1.  Acute hepatic encephalopathy


- Patient mentation has significantly improved


- CT head performed with no acute abnormalities


-Possibly in combination of elevated ammonia along with UTI.





2.  Urinary retention


- possible UTI seen on UA, low leuk but high wbc.  Awaiting urine cultures


- may be contributing to AMS


- hawk in place


-IV abx





3.  DM


- A1c noted.  No need for ISS at this time


- holding Metformin and Insulin since patient has poor PO intake.  May not need 


to be on such high doses of Lantus given A1c is 5.3, however will monitor





4.  Hypercalcemia


- possibly due to dehydration. IVF given





5.   Hyperammonemia- improving


- 102 earlier


- continue on lactulose and rifaximin. Increasing lactulose until passing BM and


then will titrate for 3-4 BM a day





6. h/o Glaucoma


- continue home eye drops


- hold off on Diamox for now given patients confusion started after medication.





7.  Disposition


- Will adjust Lactulose dose for 3-4 BMs daily and monitor for improvement in 


mentation











DAVID DEL TORO                     Apr 8, 2019 19:07

## 2019-04-09 VITALS — RESPIRATION RATE: 18 BRPM | DIASTOLIC BLOOD PRESSURE: 76 MMHG | SYSTOLIC BLOOD PRESSURE: 120 MMHG | HEART RATE: 76 BPM

## 2019-04-09 VITALS — RESPIRATION RATE: 18 BRPM | DIASTOLIC BLOOD PRESSURE: 58 MMHG | HEART RATE: 98 BPM | SYSTOLIC BLOOD PRESSURE: 134 MMHG

## 2019-04-09 VITALS — HEART RATE: 94 BPM | DIASTOLIC BLOOD PRESSURE: 65 MMHG | RESPIRATION RATE: 18 BRPM | SYSTOLIC BLOOD PRESSURE: 139 MMHG

## 2019-04-09 VITALS — HEART RATE: 79 BPM | RESPIRATION RATE: 18 BRPM | SYSTOLIC BLOOD PRESSURE: 115 MMHG | DIASTOLIC BLOOD PRESSURE: 70 MMHG

## 2019-04-09 VITALS — SYSTOLIC BLOOD PRESSURE: 134 MMHG | RESPIRATION RATE: 18 BRPM | DIASTOLIC BLOOD PRESSURE: 63 MMHG | HEART RATE: 95 BPM

## 2019-04-09 LAB
ABNORMAL IP MESSAGE: 1
ADD MAN DIFF?: NO
AMMONIA: 35 UMOL/L (ref 9–30)
ANION GAP: 12 (ref 5–13)
BASOPHIL #: 0 10^3/UL (ref 0–0.1)
BASOPHILS %: 0.5 % (ref 0–2)
BLOOD UREA NITROGEN: 11 MG/DL (ref 7–20)
CALCIUM: 9.5 MG/DL (ref 8.4–10.2)
CARBON DIOXIDE: 14 MMOL/L (ref 21–31)
CHLORIDE: 117 MMOL/L (ref 97–110)
CREATININE: 0.69 MG/DL (ref 0.44–1)
EOSINOPHILS #: 0.1 10^3/UL (ref 0–0.5)
EOSINOPHILS %: 1.5 % (ref 0–7)
GLUCOSE: 164 MG/DL (ref 70–220)
HEMATOCRIT: 27.7 % (ref 37–47)
HEMOGLOBIN: 9.6 G/DL (ref 12–16)
IMMATURE GRANS #M: 0.02 10^3/UL (ref 0–0.03)
IMMATURE GRANS % (M): 0.2 % (ref 0–0.43)
LYMPHOCYTES #: 1.6 10^3/UL (ref 0.8–2.9)
LYMPHOCYTES %: 20.1 % (ref 15–51)
MAGNESIUM: 1.8 MG/DL (ref 1.7–2.5)
MEAN CORPUSCULAR HEMOGLOBIN: 31.1 PG (ref 29–33)
MEAN CORPUSCULAR HGB CONC: 34.7 G/DL (ref 32–37)
MEAN CORPUSCULAR VOLUME: 89.6 FL (ref 82–101)
MEAN PLATELET VOLUME: 10 FL (ref 7.4–10.4)
MONOCYTE #: 0.8 10^3/UL (ref 0.3–0.9)
MONOCYTES %: 9.7 % (ref 0–11)
NEUTROPHIL #: 5.6 10^3/UL (ref 1.6–7.5)
NEUTROPHILS %: 68 % (ref 39–77)
NUCLEATED RED BLOOD CELLS #: 0 10^3/UL (ref 0–0)
NUCLEATED RED BLOOD CELLS%: 0 /100WBC (ref 0–0)
PHOSPHORUS: 4.4 MG/DL (ref 2.5–4.9)
PLATELET COUNT: 73 10^3/UL (ref 140–415)
POSITIVE DIFF: (no result)
POTASSIUM: 2.9 MMOL/L (ref 3.5–5.1)
RED BLOOD COUNT: 3.09 10^6/UL (ref 4.2–5.4)
RED CELL DISTRIBUTION WIDTH: 16.1 % (ref 11.5–14.5)
SODIUM: 143 MMOL/L (ref 135–144)
WHITE BLOOD COUNT: 8.2 10^3/UL (ref 4.8–10.8)

## 2019-04-09 RX ADMIN — PANTOPRAZOLE SODIUM SCH MG: 40 TABLET, DELAYED RELEASE ORAL at 08:29

## 2019-04-09 RX ADMIN — LACTULOSE 1 GM: 20 SOLUTION ORAL at 17:28

## 2019-04-09 RX ADMIN — RIFAXIMIN SCH MG: 550 TABLET ORAL at 08:29

## 2019-04-09 RX ADMIN — INSULIN GLARGINE SCH UNITS: 100 INJECTION, SOLUTION SUBCUTANEOUS at 08:46

## 2019-04-09 RX ADMIN — PANTOPRAZOLE SODIUM 1 MG: 40 TABLET, DELAYED RELEASE ORAL at 08:29

## 2019-04-09 RX ADMIN — INSULIN ASPART 1 UNIT: 100 INJECTION, SOLUTION INTRAVENOUS; SUBCUTANEOUS at 08:47

## 2019-04-09 RX ADMIN — LACTULOSE SCH GM: 10 SOLUTION ORAL at 00:34

## 2019-04-09 RX ADMIN — FERROUS SULFATE TAB 325 MG (65 MG ELEMENTAL FE) SCH MG: 325 (65 FE) TAB at 08:29

## 2019-04-09 RX ADMIN — LACTULOSE SCH GM: 10 SOLUTION ORAL at 12:24

## 2019-04-09 RX ADMIN — POTASSIUM CHLORIDE SCH MEQ: 1.5 POWDER, FOR SOLUTION ORAL at 08:29

## 2019-04-09 RX ADMIN — LACTULOSE 1 GM: 20 SOLUTION ORAL at 00:34

## 2019-04-09 RX ADMIN — RIFAXIMIN 1 MG: 550 TABLET ORAL at 08:29

## 2019-04-09 RX ADMIN — DEXTROSE, SODIUM CHLORIDE, AND POTASSIUM CHLORIDE SCH MLS/HR: 5; .45; .15 INJECTION INTRAVENOUS at 08:28

## 2019-04-09 RX ADMIN — INSULIN ASPART 1 UNIT: 100 INJECTION, SOLUTION INTRAVENOUS; SUBCUTANEOUS at 21:00

## 2019-04-09 RX ADMIN — INSULIN ASPART 1 UNIT: 100 INJECTION, SOLUTION INTRAVENOUS; SUBCUTANEOUS at 17:27

## 2019-04-09 RX ADMIN — CEFTRIAXONE 1 MLS/HR: 1 INJECTION, SOLUTION INTRAVENOUS at 21:05

## 2019-04-09 RX ADMIN — INSULIN ASPART 1 UNIT: 100 INJECTION, SOLUTION INTRAVENOUS; SUBCUTANEOUS at 17:28

## 2019-04-09 RX ADMIN — FERROUS SULFATE TAB 325 MG (65 MG ELEMENTAL FE) 1 MG: 325 (65 FE) TAB at 08:29

## 2019-04-09 RX ADMIN — POTASSIUM CHLORIDE 1 MEQ: 1.5 POWDER, FOR SOLUTION ORAL at 11:24

## 2019-04-09 RX ADMIN — LACTULOSE SCH GM: 10 SOLUTION ORAL at 05:55

## 2019-04-09 RX ADMIN — LACTULOSE 1 GM: 20 SOLUTION ORAL at 12:24

## 2019-04-09 RX ADMIN — FUROSEMIDE 1 MG: 20 TABLET ORAL at 08:48

## 2019-04-09 RX ADMIN — POTASSIUM CHLORIDE 1 MEQ: 1.5 POWDER, FOR SOLUTION ORAL at 08:29

## 2019-04-09 RX ADMIN — POTASSIUM CHLORIDE SCH MEQ: 1.5 POWDER, FOR SOLUTION ORAL at 11:24

## 2019-04-09 RX ADMIN — DEXTROSE, SODIUM CHLORIDE, AND POTASSIUM CHLORIDE 1 MLS/HR: 5; .45; .15 INJECTION INTRAVENOUS at 08:28

## 2019-04-09 RX ADMIN — SPIRONOLACTONE 1 MG: 50 TABLET, FILM COATED ORAL at 08:29

## 2019-04-09 RX ADMIN — SPIRONOLACTONE SCH MG: 50 TABLET ORAL at 08:29

## 2019-04-09 RX ADMIN — CEFTRIAXONE SCH MLS/HR: 1 INJECTION, SOLUTION INTRAVENOUS at 21:05

## 2019-04-09 RX ADMIN — LACTULOSE 1 GM: 20 SOLUTION ORAL at 05:55

## 2019-04-09 RX ADMIN — INSULIN GLARGINE 1 UNITS: 100 INJECTION, SOLUTION SUBCUTANEOUS at 08:46

## 2019-04-09 RX ADMIN — INSULIN ASPART 1 UNIT: 100 INJECTION, SOLUTION INTRAVENOUS; SUBCUTANEOUS at 12:26

## 2019-04-09 RX ADMIN — LACTULOSE SCH GM: 10 SOLUTION ORAL at 17:28

## 2019-04-09 NOTE — PN
Date/Time of Note


Date/Time of Note


DATE: 4/9/19 


TIME: 16:17





Objective


Vitals





Vital Signs


  Date      Temp  Pulse  Resp  B/P (MAP)   Pulse Ox  O2          O2 Flow    FiO2


Time                                                 Delivery    Rate


    4/9/19  97.6     76    18      120/76        94


     14:00                           (91)


    4/8/19                                           Room Air


     14:16








Intake and Output





4/8/19 4/8/19 4/9/19





1515:00


23:00


07:00





IntakeIntake Total


120 ml


470 ml


330 ml





BalanceBalance


120 ml


470 ml


330 ml














Results


Result Diagram:  


4/9/19 0531 4/9/19 0531








Medications


Medications





Current Medications


Ferrous Sulfate (Ferrous Sulfate (Ec)) 325 mg DAILY PO  Last administered on 


4/9/19at 08:29; Admin Dose 325 MG;  Start 4/6/19 at 09:00


Furosemide (Lasix) 20 mg DAILY PO  Last administered on 4/9/19at 08:48; Admin 


Dose 20 MG;  Start 4/6/19 at 09:00


Insulin Aspart (Novolog Insulin Pen) 12 unit WITH  MEALS SC  Last administered 


on 4/9/19at 12:26; Admin Dose 12 UNIT;  Start 4/6/19 at 07:35


Insulin Glargine (Lantus) 60 units DAILY@0800 SC  Last administered on 4/9/19at 


08:46; Admin Dose 60 UNITS;  Start 4/6/19 at 08:00


Meclizine HCl (Antivert) 25 mg DAILY  PRN PO DIZZINESS;  Start 4/5/19 at 19:00


Pantoprazole (Protonix Tab) 40 mg AC  BREAKFAST PO  Last administered on 


4/9/19at 08:29; Admin Dose 40 MG;  Start 4/6/19 at 07:30


Rifaximin (Xifaxan) 550 mg DAILY PO  Last administered on 4/9/19at 08:29; Admin 


Dose 550 MG;  Start 4/6/19 at 09:00


Spironolactone (Aldactone) 50 mg DAILY PO  Last administered on 4/9/19at 08:29; 


Admin Dose 50 MG;  Start 4/6/19 at 09:00


Acetazolamide (Diamox Sequels) 500 mg BID PO  Last administered on 4/6/19at 


10:47; Admin Dose 500 MG;  Start 4/5/19 at 21:00;  Status Hold


Patient Own Medication 1 ea QID RIGHT EYE  Last administered on 4/9/19at 12:24; 


Admin Dose 1 EA;  Start 4/6/19 at 17:00


Patient Own Medication 1 ea DAILY RIGHT EYE  Last administered on 4/9/19at 


08:30; Admin Dose 1 EA;  Start 4/7/19 at 09:00


Patient Own Medication 1 ea Q4 RIGHT EYE  Last administered on 4/9/19at 12:24; 


Admin Dose 1 EA;  Start 4/6/19 at 17:00


Lactulose (Enulose) 20 gm Q6 PO  Last administered on 4/9/19at 12:24; Admin Dose


20 GM;  Start 4/6/19 at 18:00


Miscellaneous Information 1 ea NOTE XX ;  Start 4/7/19 at 09:30


Glucose (Glutose) 15 gm Q15M  PRN PO DECREASED GLUCOSE;  Start 4/7/19 at 09:30


Glucose (Glutose) 22.5 gm Q15M  PRN PO DECREASED GLUCOSE;  Start 4/7/19 at 09:30


Dextrose (D50w Syringe) 25 ml Q15M  PRN IV DECREASED GLUCOSE;  Start 4/7/19 at 


09:30


Dextrose (D50w Syringe) 50 ml Q15M  PRN IV DECREASED GLUCOSE;  Start 4/7/19 at 


09:30


Glucagon (Glucagen) 1 mg Q15M  PRN IM DECREASED GLUCOSE;  Start 4/7/19 at 09:30


Glucose (Glutose) 15 gm Q15M  PRN BUCCAL DECREASED GLUCOSE;  Start 4/7/19 at 


09:30


Ceftriaxone Sodium 50 ml @  100 mls/hr Q24H IVPB  Last administered on 4/8/19at 


21:02; Admin Dose 100 MLS/HR;  Start 4/7/19 at 21:00


Potassium Chloride/Dextrose/ Sod Cl 1,000 ml @  50 mls/hr Q20H IV  Last 


administered on 4/9/19at 08:28; Admin Dose 50 MLS/HR;  Start 4/9/19 at 08:00


Insulin Aspart (Novolog Insulin Pen) NOVOLOG *MILD* ALGORITHM WITH MEALS  


BEDTIME SC ;  Start 4/9/19 at 18:05





VTE Prophylaxis


Risk score (from Nsg)>0 risk:  3


SCD applied (from Ns):  Yes





Lines/Catheters


IV Catheter Type:  


Hawk in Place:  No





Assessment/Plan


Hospital Course


Subjective


patient continues to improve, following all command and able to have 


conversations.





Objective





Physical exam





General: Patient is laying in bed and answers questions appropriately


Mentation: Patient is alert and oriented to self and place and mildly to 


situation


Head: Normocephalic atraumatic


Eyes: EOMI, pupils reactive to light


Neck: Supple, nontender, midline


Respiratory: Clear to auscultation bilaterally


Cardiovascular: regular rate, no obvious murmurs


Gastrointestinal: non-tender to palpation, bowel sounds heard.


Neurological: Moves all extremities spontaneously


Skin: No new skin lesions





Assessment/Plan


1.  Acute hepatic encephalopathy


- Patient mentation has significantly improved


- CT head performed with no acute abnormalities


-Possibly in combination of elevated ammonia along with UTI.





2.  Urinary retention


- possible UTI seen on UA, low leuk but high wbc.  Awaiting urine cultures


- may be contributing to AMS


- hawk in place


-IV abx





3.  DM


- A1c noted.  No need for ISS at this time


- holding Metformin and Insulin since patient has poor PO intake.  May not need 


to be on such high doses of Lantus given A1c is 5.3, however will monitor





4.  Hypercalcemia


- possibly due to dehydration. IVF given





5.   Hyperammonemia- improving


- 102 earlier this week


- continue on lactulose and rifaximin. Increasing lactulose until passing BM and


then will titrate for 3-4 BM a day





6. h/o Glaucoma


- continue home eye drops


- hold off on Diamox for now given patients confusion started after medication.





7.  Disposition


- Will adjust Lactulose dose for 3-4 BMs daily and monitor for improvement in 


mentation


-We will likely need some form of skilled nursing facility versus home health 


PT, continued monitoring for mentation improvement











DAVID DEL TORO                     Apr 9, 2019 16:17

## 2019-04-10 VITALS — HEART RATE: 89 BPM | SYSTOLIC BLOOD PRESSURE: 147 MMHG | RESPIRATION RATE: 18 BRPM | DIASTOLIC BLOOD PRESSURE: 66 MMHG

## 2019-04-10 VITALS — RESPIRATION RATE: 17 BRPM | HEART RATE: 91 BPM | SYSTOLIC BLOOD PRESSURE: 144 MMHG | DIASTOLIC BLOOD PRESSURE: 64 MMHG

## 2019-04-10 VITALS — HEART RATE: 86 BPM | DIASTOLIC BLOOD PRESSURE: 72 MMHG | SYSTOLIC BLOOD PRESSURE: 165 MMHG | RESPIRATION RATE: 17 BRPM

## 2019-04-10 VITALS — DIASTOLIC BLOOD PRESSURE: 63 MMHG | RESPIRATION RATE: 18 BRPM | SYSTOLIC BLOOD PRESSURE: 132 MMHG | HEART RATE: 97 BPM

## 2019-04-10 LAB
ABNORMAL IP MESSAGE: 1
ADD MAN DIFF?: NO
AMMONIA: 34 UMOL/L (ref 9–30)
ANION GAP: 10 (ref 5–13)
BASOPHIL #: 0 10^3/UL (ref 0–0.1)
BASOPHILS %: 0.5 % (ref 0–2)
BLOOD UREA NITROGEN: 15 MG/DL (ref 7–20)
CALCIUM: 8.9 MG/DL (ref 8.4–10.2)
CARBON DIOXIDE: 12 MMOL/L (ref 21–31)
CHLORIDE: 119 MMOL/L (ref 97–110)
CREATININE: 0.69 MG/DL (ref 0.44–1)
EOSINOPHILS #: 0.2 10^3/UL (ref 0–0.5)
EOSINOPHILS %: 2.6 % (ref 0–7)
GLUCOSE: 156 MG/DL (ref 70–220)
HEMATOCRIT: 24.7 % (ref 37–47)
HEMOGLOBIN: 8.7 G/DL (ref 12–16)
IMMATURE GRANS #M: 0.04 10^3/UL (ref 0–0.03)
IMMATURE GRANS % (M): 0.5 % (ref 0–0.43)
LYMPHOCYTES #: 2.3 10^3/UL (ref 0.8–2.9)
LYMPHOCYTES %: 26.9 % (ref 15–51)
MAGNESIUM: 1.8 MG/DL (ref 1.7–2.5)
MEAN CORPUSCULAR HEMOGLOBIN: 31.3 PG (ref 29–33)
MEAN CORPUSCULAR HGB CONC: 35.2 G/DL (ref 32–37)
MEAN CORPUSCULAR VOLUME: 88.8 FL (ref 82–101)
MEAN PLATELET VOLUME: 10.2 FL (ref 7.4–10.4)
MONOCYTE #: 0.8 10^3/UL (ref 0.3–0.9)
MONOCYTES %: 9.5 % (ref 0–11)
NEUTROPHIL #: 5.2 10^3/UL (ref 1.6–7.5)
NEUTROPHILS %: 60 % (ref 39–77)
NUCLEATED RED BLOOD CELLS #: 0 10^3/UL (ref 0–0)
NUCLEATED RED BLOOD CELLS%: 0 /100WBC (ref 0–0)
PHOSPHORUS: 4.1 MG/DL (ref 2.5–4.9)
PLATELET COUNT: 65 10^3/UL (ref 140–415)
POSITIVE DIFF: (no result)
POTASSIUM: 3.2 MMOL/L (ref 3.5–5.1)
RED BLOOD COUNT: 2.78 10^6/UL (ref 4.2–5.4)
RED CELL DISTRIBUTION WIDTH: 15.9 % (ref 11.5–14.5)
SODIUM: 141 MMOL/L (ref 135–144)
WHITE BLOOD COUNT: 8.6 10^3/UL (ref 4.8–10.8)

## 2019-04-10 RX ADMIN — INSULIN ASPART 1 UNIT: 100 INJECTION, SOLUTION INTRAVENOUS; SUBCUTANEOUS at 12:51

## 2019-04-10 RX ADMIN — LACTULOSE SCH GM: 10 SOLUTION ORAL at 01:24

## 2019-04-10 RX ADMIN — LACTULOSE 1 GM: 20 SOLUTION ORAL at 01:24

## 2019-04-10 RX ADMIN — SPIRONOLACTONE 1 MG: 50 TABLET, FILM COATED ORAL at 08:22

## 2019-04-10 RX ADMIN — LACTULOSE SCH GM: 10 SOLUTION ORAL at 23:35

## 2019-04-10 RX ADMIN — POTASSIUM CHLORIDE SCH MEQ: 20 TABLET, EXTENDED RELEASE ORAL at 16:42

## 2019-04-10 RX ADMIN — RIFAXIMIN SCH MG: 550 TABLET ORAL at 09:21

## 2019-04-10 RX ADMIN — LACTULOSE SCH GM: 10 SOLUTION ORAL at 05:42

## 2019-04-10 RX ADMIN — INSULIN ASPART 1 UNIT: 100 INJECTION, SOLUTION INTRAVENOUS; SUBCUTANEOUS at 08:19

## 2019-04-10 RX ADMIN — AMLODIPINE BESYLATE 1 MG: 5 TABLET ORAL at 12:44

## 2019-04-10 RX ADMIN — INSULIN ASPART 1 UNIT: 100 INJECTION, SOLUTION INTRAVENOUS; SUBCUTANEOUS at 08:20

## 2019-04-10 RX ADMIN — LACTULOSE SCH GM: 10 SOLUTION ORAL at 12:44

## 2019-04-10 RX ADMIN — RIFAXIMIN 1 MG: 550 TABLET ORAL at 09:21

## 2019-04-10 RX ADMIN — DEXTROSE, SODIUM CHLORIDE, AND POTASSIUM CHLORIDE SCH MLS/HR: 5; .45; .15 INJECTION INTRAVENOUS at 05:42

## 2019-04-10 RX ADMIN — INSULIN ASPART 1 UNIT: 100 INJECTION, SOLUTION INTRAVENOUS; SUBCUTANEOUS at 20:40

## 2019-04-10 RX ADMIN — LACTULOSE 1 GM: 20 SOLUTION ORAL at 12:44

## 2019-04-10 RX ADMIN — LACTULOSE 1 GM: 20 SOLUTION ORAL at 23:35

## 2019-04-10 RX ADMIN — DEXTROSE, SODIUM CHLORIDE, AND POTASSIUM CHLORIDE 1 MLS/HR: 5; .45; .15 INJECTION INTRAVENOUS at 05:42

## 2019-04-10 RX ADMIN — SPIRONOLACTONE SCH MG: 50 TABLET ORAL at 08:22

## 2019-04-10 RX ADMIN — POTASSIUM CHLORIDE 1 MEQ: 1500 TABLET, EXTENDED RELEASE ORAL at 12:44

## 2019-04-10 RX ADMIN — INSULIN GLARGINE 1 UNITS: 100 INJECTION, SOLUTION SUBCUTANEOUS at 08:19

## 2019-04-10 RX ADMIN — LACTULOSE SCH GM: 10 SOLUTION ORAL at 17:49

## 2019-04-10 RX ADMIN — PANTOPRAZOLE SODIUM SCH MG: 40 TABLET, DELAYED RELEASE ORAL at 05:43

## 2019-04-10 RX ADMIN — AMLODIPINE BESYLATE SCH MG: 5 TABLET ORAL at 12:44

## 2019-04-10 RX ADMIN — INSULIN ASPART 1 UNIT: 100 INJECTION, SOLUTION INTRAVENOUS; SUBCUTANEOUS at 12:50

## 2019-04-10 RX ADMIN — POTASSIUM CHLORIDE 1 MEQ: 1500 TABLET, EXTENDED RELEASE ORAL at 16:42

## 2019-04-10 RX ADMIN — FERROUS SULFATE TAB 325 MG (65 MG ELEMENTAL FE) 1 MG: 325 (65 FE) TAB at 08:22

## 2019-04-10 RX ADMIN — PANTOPRAZOLE SODIUM 1 MG: 40 TABLET, DELAYED RELEASE ORAL at 05:43

## 2019-04-10 RX ADMIN — FERROUS SULFATE TAB 325 MG (65 MG ELEMENTAL FE) SCH MG: 325 (65 FE) TAB at 08:22

## 2019-04-10 RX ADMIN — INSULIN ASPART 1 UNIT: 100 INJECTION, SOLUTION INTRAVENOUS; SUBCUTANEOUS at 17:54

## 2019-04-10 RX ADMIN — LACTULOSE 1 GM: 20 SOLUTION ORAL at 17:49

## 2019-04-10 RX ADMIN — POTASSIUM CHLORIDE SCH MEQ: 20 TABLET, EXTENDED RELEASE ORAL at 12:44

## 2019-04-10 RX ADMIN — FUROSEMIDE 1 MG: 20 TABLET ORAL at 08:23

## 2019-04-10 RX ADMIN — LACTULOSE 1 GM: 20 SOLUTION ORAL at 05:42

## 2019-04-10 RX ADMIN — INSULIN GLARGINE SCH UNITS: 100 INJECTION, SOLUTION SUBCUTANEOUS at 08:19

## 2019-04-11 VITALS — SYSTOLIC BLOOD PRESSURE: 141 MMHG | HEART RATE: 91 BPM | DIASTOLIC BLOOD PRESSURE: 63 MMHG | RESPIRATION RATE: 18 BRPM

## 2019-04-11 VITALS — SYSTOLIC BLOOD PRESSURE: 146 MMHG | DIASTOLIC BLOOD PRESSURE: 66 MMHG | HEART RATE: 92 BPM | RESPIRATION RATE: 18 BRPM

## 2019-04-11 VITALS — HEART RATE: 85 BPM | DIASTOLIC BLOOD PRESSURE: 53 MMHG | RESPIRATION RATE: 17 BRPM | SYSTOLIC BLOOD PRESSURE: 121 MMHG

## 2019-04-11 VITALS — RESPIRATION RATE: 17 BRPM | HEART RATE: 90 BPM | DIASTOLIC BLOOD PRESSURE: 67 MMHG | SYSTOLIC BLOOD PRESSURE: 137 MMHG

## 2019-04-11 LAB
ABNORMAL IP MESSAGE: 1
ADD MAN DIFF?: NO
ALLEN TEST: (no result)
ANION GAP: 11 (ref 5–13)
ARTERIAL BASE EXCESS: -12.4 MMOL/L (ref -3–3)
ARTERIAL BLOOD GAS OXYGEN SAT: 97.1 MMHG (ref 95–98)
ARTERIAL COHB: 0.3 % (ref 0–3)
ARTERIAL FRACTION OF OXYHGB: 96.1 % (ref 93–99)
ARTERIAL HCO3: 12.1 MMOL/L (ref 22–26)
ARTERIAL METHB: 0.7 % (ref 0–1.5)
ARTERIAL PCO2: 22.9 MMHG (ref 35–45)
BASOPHIL #: 0 10^3/UL (ref 0–0.1)
BASOPHILS %: 0.3 % (ref 0–2)
BLOOD UREA NITROGEN: 17 MG/DL (ref 7–20)
CALCIUM: 8.8 MG/DL (ref 8.4–10.2)
CARBON DIOXIDE: 13 MMOL/L (ref 21–31)
CHLORIDE: 116 MMOL/L (ref 97–110)
CREATININE: 0.66 MG/DL (ref 0.44–1)
EOSINOPHILS #: 0.3 10^3/UL (ref 0–0.5)
EOSINOPHILS %: 4.2 % (ref 0–7)
FIO2: 21 %
GLUCOSE: 84 MG/DL (ref 70–220)
HEMATOCRIT: 23.1 % (ref 37–47)
HEMOGLOBIN: 8.1 G/DL (ref 12–16)
IMMATURE GRANS #M: 0.02 10^3/UL (ref 0–0.03)
IMMATURE GRANS % (M): 0.3 % (ref 0–0.43)
INR: 1.42
LYMPHOCYTES #: 2 10^3/UL (ref 0.8–2.9)
LYMPHOCYTES %: 29.4 % (ref 15–51)
MAGNESIUM: 1.9 MG/DL (ref 1.7–2.5)
MEAN CORPUSCULAR HEMOGLOBIN: 31.5 PG (ref 29–33)
MEAN CORPUSCULAR HGB CONC: 35.1 G/DL (ref 32–37)
MEAN CORPUSCULAR VOLUME: 89.9 FL (ref 82–101)
MEAN PLATELET VOLUME: 10 FL (ref 7.4–10.4)
MODE: (no result)
MONOCYTE #: 0.5 10^3/UL (ref 0.3–0.9)
MONOCYTES %: 7.5 % (ref 0–11)
NEUTROPHIL #: 4 10^3/UL (ref 1.6–7.5)
NEUTROPHILS %: 58.3 % (ref 39–77)
NUCLEATED RED BLOOD CELLS #: 0 10^3/UL (ref 0–0)
NUCLEATED RED BLOOD CELLS%: 0 /100WBC (ref 0–0)
O2 A-A PPRESDIFF RESPIRATORY: 11.4 MMHG (ref 7–24)
PARTIAL THROMBOPLASTIN TIME: 31 SEC (ref 23–35)
PHOSPHORUS: 3.7 MG/DL (ref 2.5–4.9)
PLATELET COUNT: 63 10^3/UL (ref 140–415)
PLATELET COUNT: 68 10^3/UL (ref 140–415)
POSITIVE DIFF: (no result)
POTASSIUM: 3.4 MMOL/L (ref 3.5–5.1)
PROTIME: 17.5 SEC (ref 11.9–14.9)
PT RATIO: 1.4
RED BLOOD COUNT: 2.57 10^6/UL (ref 4.2–5.4)
RED CELL DISTRIBUTION WIDTH: 16.5 % (ref 11.5–14.5)
SODIUM: 140 MMOL/L (ref 135–144)
THROMBIN TIME: 16 SEC (ref 13.8–19.1)
WHITE BLOOD COUNT: 6.8 10^3/UL (ref 4.8–10.8)

## 2019-04-11 RX ADMIN — LACTULOSE 1 GM: 20 SOLUTION ORAL at 12:00

## 2019-04-11 RX ADMIN — INSULIN GLARGINE SCH UNITS: 100 INJECTION, SOLUTION SUBCUTANEOUS at 08:10

## 2019-04-11 RX ADMIN — AMLODIPINE BESYLATE SCH MG: 5 TABLET ORAL at 08:24

## 2019-04-11 RX ADMIN — PANTOPRAZOLE SODIUM 1 MG: 40 TABLET, DELAYED RELEASE ORAL at 05:35

## 2019-04-11 RX ADMIN — BUDESONIDE SCH MG: 0.5 SUSPENSION RESPIRATORY (INHALATION) at 19:28

## 2019-04-11 RX ADMIN — INSULIN ASPART 1 UNIT: 100 INJECTION, SOLUTION INTRAVENOUS; SUBCUTANEOUS at 08:10

## 2019-04-11 RX ADMIN — FERROUS SULFATE TAB 325 MG (65 MG ELEMENTAL FE) 1 MG: 325 (65 FE) TAB at 08:24

## 2019-04-11 RX ADMIN — FERROUS SULFATE TAB 325 MG (65 MG ELEMENTAL FE) SCH MG: 325 (65 FE) TAB at 08:24

## 2019-04-11 RX ADMIN — LACTULOSE SCH GM: 10 SOLUTION ORAL at 05:37

## 2019-04-11 RX ADMIN — LACTULOSE 1 GM: 20 SOLUTION ORAL at 17:24

## 2019-04-11 RX ADMIN — BUDESONIDE 1 MG: 0.5 SUSPENSION RESPIRATORY (INHALATION) at 19:28

## 2019-04-11 RX ADMIN — INSULIN ASPART 1 UNIT: 100 INJECTION, SOLUTION INTRAVENOUS; SUBCUTANEOUS at 21:10

## 2019-04-11 RX ADMIN — INSULIN GLARGINE 1 UNITS: 100 INJECTION, SOLUTION SUBCUTANEOUS at 08:10

## 2019-04-11 RX ADMIN — IPRATROPIUM BROMIDE AND ALBUTEROL SULFATE SCH ML: .5; 3 SOLUTION RESPIRATORY (INHALATION) at 19:28

## 2019-04-11 RX ADMIN — PANTOPRAZOLE SODIUM SCH MG: 40 TABLET, DELAYED RELEASE ORAL at 05:35

## 2019-04-11 RX ADMIN — SPIRONOLACTONE SCH MG: 50 TABLET ORAL at 08:24

## 2019-04-11 RX ADMIN — FUROSEMIDE 1 MG: 20 TABLET ORAL at 08:24

## 2019-04-11 RX ADMIN — LACTULOSE SCH GM: 10 SOLUTION ORAL at 17:24

## 2019-04-11 RX ADMIN — LACTULOSE 1 GM: 20 SOLUTION ORAL at 05:37

## 2019-04-11 RX ADMIN — POTASSIUM CHLORIDE 1 MEQ: 1.5 POWDER, FOR SOLUTION ORAL at 13:22

## 2019-04-11 RX ADMIN — LACTULOSE 1 GM: 20 SOLUTION ORAL at 13:22

## 2019-04-11 RX ADMIN — AMLODIPINE BESYLATE 1 MG: 5 TABLET ORAL at 08:24

## 2019-04-11 RX ADMIN — INSULIN ASPART 1 UNIT: 100 INJECTION, SOLUTION INTRAVENOUS; SUBCUTANEOUS at 12:00

## 2019-04-11 RX ADMIN — RIFAXIMIN 1 MG: 550 TABLET ORAL at 08:24

## 2019-04-11 RX ADMIN — SPIRONOLACTONE 1 MG: 50 TABLET, FILM COATED ORAL at 08:24

## 2019-04-11 RX ADMIN — LACTULOSE SCH GM: 10 SOLUTION ORAL at 13:22

## 2019-04-11 RX ADMIN — INSULIN ASPART 1 UNIT: 100 INJECTION, SOLUTION INTRAVENOUS; SUBCUTANEOUS at 08:00

## 2019-04-11 RX ADMIN — IPRATROPIUM BROMIDE AND ALBUTEROL SULFATE 1 ML: .5; 3 SOLUTION RESPIRATORY (INHALATION) at 19:28

## 2019-04-11 RX ADMIN — LACTULOSE SCH GM: 10 SOLUTION ORAL at 12:00

## 2019-04-11 RX ADMIN — RIFAXIMIN SCH MG: 550 TABLET ORAL at 08:24

## 2019-04-11 RX ADMIN — INSULIN ASPART 1 UNIT: 100 INJECTION, SOLUTION INTRAVENOUS; SUBCUTANEOUS at 17:30

## 2019-04-11 NOTE — PN
Date/Time of Note


Date/Time of Note


DATE: 4/11/19 


TIME: 16:55





Objective


Vitals





Vital Signs


  Date      Temp  Pulse  Resp  B/P (MAP)   Pulse Ox  O2          O2 Flow    FiO2


Time                                                 Delivery    Rate


   4/11/19  98.0     90    17      137/67       100


     15:43                           (90)


    4/8/19                                           Room Air


     14:16








Intake and Output





4/10/19


4/10/19


4/11/19





1515:00


23:00


07:00





IntakeIntake Total


1118 ml


780 ml


660 ml





OutputOutput Total


450 ml


400 ml





BalanceBalance


668 ml


380 ml


660 ml














Results


Result Diagram:  


4/11/19 1436                                                                    


           4/11/19 0500








Medications


Medications





Current Medications


Ferrous Sulfate (Ferrous Sulfate (Ec)) 325 mg DAILY PO  Last administered on 


4/11/19at 08:24; Admin Dose 325 MG;  Start 4/6/19 at 09:00


Furosemide (Lasix) 20 mg DAILY PO  Last administered on 4/11/19at 08:24; Admin 


Dose 20 MG;  Start 4/6/19 at 09:00


Meclizine HCl (Antivert) 25 mg DAILY  PRN PO DIZZINESS;  Start 4/5/19 at 19:00


Pantoprazole (Protonix Tab) 40 mg AC  BREAKFAST PO  Last administered on 


4/11/19at 05:35; Admin Dose 40 MG;  Start 4/6/19 at 07:30


Rifaximin (Xifaxan) 550 mg DAILY PO  Last administered on 4/11/19at 08:24; Admin


Dose 550 MG;  Start 4/6/19 at 09:00


Spironolactone (Aldactone) 50 mg DAILY PO  Last administered on 4/11/19at 08:24;


Admin Dose 50 MG;  Start 4/6/19 at 09:00


Acetazolamide (Diamox Sequels) 500 mg BID PO  Last administered on 4/6/19at 


10:47; Admin Dose 500 MG;  Start 4/5/19 at 21:00;  Status Hold


Patient Own Medication 1 ea QID RIGHT EYE  Last administered on 4/11/19at 13:22;


Admin Dose 1 EA;  Start 4/6/19 at 17:00


Patient Own Medication 1 ea DAILY RIGHT EYE  Last administered on 4/11/19at 


08:11; Admin Dose 1 EA;  Start 4/7/19 at 09:00


Patient Own Medication 1 ea Q4 RIGHT EYE  Last administered on 4/11/19at 13:22; 


Admin Dose 1 EA;  Start 4/6/19 at 17:00


Lactulose (Enulose) 20 gm Q6 PO  Last administered on 4/11/19at 05:37; Admin 


Dose 20 GM;  Start 4/6/19 at 18:00


Miscellaneous Information 1 ea NOTE XX ;  Start 4/7/19 at 09:30


Glucose (Glutose) 15 gm Q15M  PRN PO DECREASED GLUCOSE;  Start 4/7/19 at 09:30


Glucose (Glutose) 22.5 gm Q15M  PRN PO DECREASED GLUCOSE;  Start 4/7/19 at 09:30


Dextrose (D50w Syringe) 25 ml Q15M  PRN IV DECREASED GLUCOSE;  Start 4/7/19 at 


09:30


Dextrose (D50w Syringe) 50 ml Q15M  PRN IV DECREASED GLUCOSE;  Start 4/7/19 at 


09:30


Glucagon (Glucagen) 1 mg Q15M  PRN IM DECREASED GLUCOSE;  Start 4/7/19 at 09:30


Glucose (Glutose) 15 gm Q15M  PRN BUCCAL DECREASED GLUCOSE;  Start 4/7/19 at 


09:30


Insulin Aspart (Novolog Insulin Pen) NOVOLOG *MILD* ALGORITHM WITH MEALS  


BEDTIME SC  Last administered on 4/10/19at 20:40; Admin Dose 1 UNIT;  Start 


4/9/19 at 18:05


Amlodipine Besylate (Norvasc) 5 mg DAILY PO  Last administered on 4/11/19at 


08:24; Admin Dose 5 MG;  Start 4/10/19 at 12:00


Metformin HCl (Glucophage) 1,000 mg BID WITH  MEALS PO ;  Start 4/11/19 at 18:00


Albuterol/ Ipratropium (Duoneb) 3 ml Q6HWA RESP  THERAPY HHN ;  Start 4/11/19 at


20:00


Albuterol/ Ipratropium (Duoneb) 3 ml Q2H RESP THERAPY  PRN HHN shortness of 


breath;  Start 4/11/19 at 15:00





VTE Prophylaxis


Risk score (from Nsg)>0 risk:  6


SCD applied (from Nsg):  Yes





Lines/Catheters


IV Catheter Type:  


Hawk in Place:  No





Assessment/Plan


Hospital Course


Subjective


patient appears to have more bruising today than the previous, otherwise 


mentation is at baseline, has mild shortness of breath with ambulation





Objective





Physical exam





General: Patient is laying in bed and answers questions appropriately


Mentation: Patient is alert and oriented to self and place and mildly to 


situation


Head: Normocephalic atraumatic


Eyes: EOMI, pupils reactive to light


Neck: Supple, nontender, midline


Respiratory: Clear to auscultation bilaterally


Cardiovascular: regular rate, no obvious murmurs


Gastrointestinal: non-tender to palpation, bowel sounds heard.


Neurological: Moves all extremities spontaneously


Skin: No new skin lesions





Assessment/Plan


Acute hepatic encephalopathy-resolved


- Patient mentation has returned to baseline


- CT head performed with no acute abnormalities


-Possibly in combination of elevated ammonia along with UTI.





Generalized bruising


-Likely secondary to trauma cytopenia which is secondary to cirrhosis


-Coag screen done, INR is mildly elevated 1.42


-No signs of  GI bleed however will monitor hemoglobin closely


-We will monitor closely, it appears patient is having excessive bruising sites 


usually at sites of IV insertion however there are other sites that are 


unexplained,.





Hypoxia


-Mild hypoxia, with ambulation


-Chest x-ray done, indicative of small airway disease, DuoNeb and budesonide for


now





Cirrhosis


-[place]


-Continue Lasix and spironolactone





Urinary retention/UTI


- possible UTI seen on UA, low leuk but high wbc.  urine cultures negative, 


patient received 3 doses of ceftriaxone, will monitor off abx.


- may have contributed to AMS


- hawk in place, will dc


-IV abx will stop





DM


- A1c noted. 


-Patient appears to have a very complex history regarding her diabetes.  Patient


has been fluctuating with her sugar levels with very high doses of Lantus and 


insulin however the patient's daughter states that once she is on metformin she 


will need virtually no insulin.  On this new information, will attempt to 


restart metformin 1000 twice a day with insulin sliding scale as coverage taking


off the other insulin, will need to re-add new insulin if needed to correct 


excessive sugars.





Hypercalcemia, resolved


- possibly due to dehydration. IVF given. monitor





Hyperammonemia- improving


- 102 earlier this week, now with a 30


- continue on lactulose and rifaximin. Increasing lactulose until passing BM and


then will titrate for 3-4 BM a day





h/o Glaucoma


- continue home eye drops


- hold off on Diamox for now given patients confusion started after medication.





Disposition


- Will adjust Lactulose dose for 3-4 BMs daily 


-Patient's hemoglobin and excessive bruising will be monitored closely, if 


stable will DC to  [facility] tomorrow.











DAVID DEL TORO                    Apr 11, 2019 17:01

## 2019-04-12 VITALS — RESPIRATION RATE: 18 BRPM | HEART RATE: 97 BPM | SYSTOLIC BLOOD PRESSURE: 133 MMHG | DIASTOLIC BLOOD PRESSURE: 62 MMHG

## 2019-04-12 VITALS — DIASTOLIC BLOOD PRESSURE: 62 MMHG | HEART RATE: 89 BPM | RESPIRATION RATE: 18 BRPM | SYSTOLIC BLOOD PRESSURE: 134 MMHG

## 2019-04-12 VITALS — HEART RATE: 89 BPM | SYSTOLIC BLOOD PRESSURE: 126 MMHG | RESPIRATION RATE: 18 BRPM | DIASTOLIC BLOOD PRESSURE: 58 MMHG

## 2019-04-12 VITALS — RESPIRATION RATE: 18 BRPM | DIASTOLIC BLOOD PRESSURE: 60 MMHG | HEART RATE: 89 BPM | SYSTOLIC BLOOD PRESSURE: 138 MMHG

## 2019-04-12 LAB
% IRON SATURATION: 22 % SAT (ref 22–52)
ABNORMAL IP MESSAGE: 1
ADD MAN DIFF?: NO
ALANINE AMINOTRANSFERASE: 25 IU/L (ref 13–69)
ALBUMIN: 2.9 G/DL (ref 3.3–4.9)
ALKALINE PHOSPHATASE: 95 IU/L (ref 42–121)
ANION GAP: 9 (ref 5–13)
ASPARTATE AMINO TRANSFERASE: 40 IU/L (ref 15–46)
BASOPHIL #: 0 10^3/UL (ref 0–0.1)
BASOPHILS %: 0.7 % (ref 0–2)
BILIRUBIN,DIRECT: 0 MG/DL (ref 0–0.2)
BILIRUBIN,TOTAL: 1.2 MG/DL (ref 0.2–1.3)
BLOOD UREA NITROGEN: 19 MG/DL (ref 7–20)
CALCIUM: 8.8 MG/DL (ref 8.4–10.2)
CARBON DIOXIDE: 16 MMOL/L (ref 21–31)
CHLORIDE: 116 MMOL/L (ref 97–110)
CREATININE: 0.66 MG/DL (ref 0.44–1)
EOSINOPHILS #: 0.2 10^3/UL (ref 0–0.5)
EOSINOPHILS %: 3.9 % (ref 0–7)
FERRITIN: 93.8 NG/ML (ref 11.1–264)
GLUCOSE: 83 MG/DL (ref 70–220)
HEMATOCRIT: 21.9 % (ref 37–47)
HEMOGLOBIN: 7.7 G/DL (ref 12–16)
IMMATURE GRANS #M: 0.02 10^3/UL (ref 0–0.03)
IMMATURE GRANS % (M): 0.3 % (ref 0–0.43)
IRON: 64 UG/DL (ref 35–150)
LACTATE DEHYDROGENASE: 558 IU/L (ref 313–618)
LYMPHOCYTES #: 1.8 10^3/UL (ref 0.8–2.9)
LYMPHOCYTES %: 30.3 % (ref 15–51)
MAGNESIUM: 2 MG/DL (ref 1.7–2.5)
MEAN CORPUSCULAR HEMOGLOBIN: 32 PG (ref 29–33)
MEAN CORPUSCULAR HGB CONC: 35.2 G/DL (ref 32–37)
MEAN CORPUSCULAR VOLUME: 90.9 FL (ref 82–101)
MEAN PLATELET VOLUME: 10.5 FL (ref 7.4–10.4)
MONOCYTE #: 0.6 10^3/UL (ref 0.3–0.9)
MONOCYTES %: 10.2 % (ref 0–11)
NEUTROPHIL #: 3.3 10^3/UL (ref 1.6–7.5)
NEUTROPHILS %: 54.6 % (ref 39–77)
NUCLEATED RED BLOOD CELLS #: 0 10^3/UL (ref 0–0)
NUCLEATED RED BLOOD CELLS%: 0 /100WBC (ref 0–0)
OCCULT BLOOD STOOL: NEGATIVE
PHOSPHORUS: 3.1 MG/DL (ref 2.5–4.9)
PLATELET COUNT: 63 10^3/UL (ref 140–415)
POSITIVE DIFF: (no result)
POTASSIUM: 4.1 MMOL/L (ref 3.5–5.1)
RED BLOOD COUNT: 2.41 10^6/UL (ref 4.2–5.4)
RED CELL DISTRIBUTION WIDTH: 16.6 % (ref 11.5–14.5)
RETICULOCYTE COUNT #: 0.12 X10^6 (ref 0.02–0.11)
RETICULOCYTE COUNT %: 4.7 % (ref 0.5–1.5)
RETICULOCYTE RBC: 2.54
SODIUM: 141 MMOL/L (ref 135–144)
TOTAL IRON BINDING CAPACITY: 291 UG/DL (ref 241–421)
TOTAL PROTEIN: 5.9 G/DL (ref 6.1–8.1)
WHITE BLOOD COUNT: 6.1 10^3/UL (ref 4.8–10.8)

## 2019-04-12 RX ADMIN — FERROUS SULFATE TAB 325 MG (65 MG ELEMENTAL FE) 1 MG: 325 (65 FE) TAB at 08:51

## 2019-04-12 RX ADMIN — INSULIN ASPART 1 UNIT: 100 INJECTION, SOLUTION INTRAVENOUS; SUBCUTANEOUS at 12:00

## 2019-04-12 RX ADMIN — INSULIN ASPART 1 UNIT: 100 INJECTION, SOLUTION INTRAVENOUS; SUBCUTANEOUS at 21:04

## 2019-04-12 RX ADMIN — IPRATROPIUM BROMIDE AND ALBUTEROL SULFATE 1 ML: .5; 3 SOLUTION RESPIRATORY (INHALATION) at 14:12

## 2019-04-12 RX ADMIN — LACTULOSE 1 GM: 20 SOLUTION ORAL at 13:02

## 2019-04-12 RX ADMIN — LACTULOSE 1 GM: 20 SOLUTION ORAL at 00:00

## 2019-04-12 RX ADMIN — LACTULOSE SCH GM: 10 SOLUTION ORAL at 05:48

## 2019-04-12 RX ADMIN — RIFAXIMIN SCH MG: 550 TABLET ORAL at 08:51

## 2019-04-12 RX ADMIN — LACTULOSE 1 GM: 20 SOLUTION ORAL at 05:48

## 2019-04-12 RX ADMIN — AMLODIPINE BESYLATE SCH MG: 5 TABLET ORAL at 08:51

## 2019-04-12 RX ADMIN — BUDESONIDE SCH MG: 0.5 SUSPENSION RESPIRATORY (INHALATION) at 07:41

## 2019-04-12 RX ADMIN — AMLODIPINE BESYLATE 1 MG: 5 TABLET ORAL at 08:51

## 2019-04-12 RX ADMIN — PANTOPRAZOLE SODIUM SCH MG: 40 TABLET, DELAYED RELEASE ORAL at 05:50

## 2019-04-12 RX ADMIN — BUDESONIDE 1 MG: 0.5 SUSPENSION RESPIRATORY (INHALATION) at 07:41

## 2019-04-12 RX ADMIN — FERROUS SULFATE TAB 325 MG (65 MG ELEMENTAL FE) SCH MG: 325 (65 FE) TAB at 08:51

## 2019-04-12 RX ADMIN — LACTULOSE SCH GM: 10 SOLUTION ORAL at 17:42

## 2019-04-12 RX ADMIN — IPRATROPIUM BROMIDE AND ALBUTEROL SULFATE SCH ML: .5; 3 SOLUTION RESPIRATORY (INHALATION) at 21:07

## 2019-04-12 RX ADMIN — SPIRONOLACTONE 1 MG: 50 TABLET, FILM COATED ORAL at 08:51

## 2019-04-12 RX ADMIN — PANTOPRAZOLE SODIUM 1 MG: 40 TABLET, DELAYED RELEASE ORAL at 05:50

## 2019-04-12 RX ADMIN — RIFAXIMIN 1 MG: 550 TABLET ORAL at 08:51

## 2019-04-12 RX ADMIN — LACTULOSE SCH GM: 10 SOLUTION ORAL at 13:02

## 2019-04-12 RX ADMIN — BUDESONIDE 1 MG: 0.5 SUSPENSION RESPIRATORY (INHALATION) at 21:07

## 2019-04-12 RX ADMIN — INSULIN ASPART 1 UNIT: 100 INJECTION, SOLUTION INTRAVENOUS; SUBCUTANEOUS at 17:42

## 2019-04-12 RX ADMIN — IPRATROPIUM BROMIDE AND ALBUTEROL SULFATE 1 ML: .5; 3 SOLUTION RESPIRATORY (INHALATION) at 21:07

## 2019-04-12 RX ADMIN — SPIRONOLACTONE SCH MG: 50 TABLET ORAL at 08:51

## 2019-04-12 RX ADMIN — LACTULOSE SCH GM: 10 SOLUTION ORAL at 00:00

## 2019-04-12 RX ADMIN — FUROSEMIDE 1 MG: 20 TABLET ORAL at 08:51

## 2019-04-12 RX ADMIN — INSULIN ASPART 1 UNIT: 100 INJECTION, SOLUTION INTRAVENOUS; SUBCUTANEOUS at 07:51

## 2019-04-12 RX ADMIN — IPRATROPIUM BROMIDE AND ALBUTEROL SULFATE SCH ML: .5; 3 SOLUTION RESPIRATORY (INHALATION) at 07:42

## 2019-04-12 RX ADMIN — IPRATROPIUM BROMIDE AND ALBUTEROL SULFATE SCH ML: .5; 3 SOLUTION RESPIRATORY (INHALATION) at 14:12

## 2019-04-12 RX ADMIN — BUDESONIDE SCH MG: 0.5 SUSPENSION RESPIRATORY (INHALATION) at 21:07

## 2019-04-12 RX ADMIN — IPRATROPIUM BROMIDE AND ALBUTEROL SULFATE 1 ML: .5; 3 SOLUTION RESPIRATORY (INHALATION) at 07:42

## 2019-04-12 RX ADMIN — LACTULOSE 1 GM: 20 SOLUTION ORAL at 17:42

## 2019-04-12 NOTE — PN
Date/Time of Note


Date/Time of Note


DATE: 4/12/19 


TIME: 12:43





Objective


Vitals





Vital Signs


  Date      Temp  Pulse  Resp  B/P (MAP)   Pulse Ox  O2          O2 Flow    FiO2


Time                                                 Delivery    Rate


   4/12/19  97.5     89    18      126/58       100


     07:54                           (80)


   4/12/19                                                                    21


     07:26


    4/8/19                                           Room Air


     14:16








Intake and Output





4/11/19 4/11/19 4/12/19





1515:00


23:00


07:00





IntakeIntake Total


980 ml


480 ml





OutputOutput Total


100 ml





BalanceBalance


980 ml


480 ml


-100 ml














Results


Result Diagram:  


4/12/19 0445                                                                    


           4/12/19 0445








Medications


Medications





Current Medications


Ferrous Sulfate (Ferrous Sulfate (Ec)) 325 mg DAILY PO  Last administered on 


4/12/19at 08:51; Admin Dose 325 MG;  Start 4/6/19 at 09:00


Furosemide (Lasix) 20 mg DAILY PO  Last administered on 4/12/19at 08:51; Admin 


Dose 20 MG;  Start 4/6/19 at 09:00


Meclizine HCl (Antivert) 25 mg DAILY  PRN PO DIZZINESS;  Start 4/5/19 at 19:00


Pantoprazole (Protonix Tab) 40 mg AC  BREAKFAST PO  Last administered on 


4/12/19at 05:50; Admin Dose 40 MG;  Start 4/6/19 at 07:30


Rifaximin (Xifaxan) 550 mg DAILY PO  Last administered on 4/12/19at 08:51; Admin


Dose 550 MG;  Start 4/6/19 at 09:00


Spironolactone (Aldactone) 50 mg DAILY PO  Last administered on 4/12/19at 08:51;


Admin Dose 50 MG;  Start 4/6/19 at 09:00


Acetazolamide (Diamox Sequels) 500 mg BID PO  Last administered on 4/6/19at 


10:47; Admin Dose 500 MG;  Start 4/5/19 at 21:00;  Status Hold


Patient Own Medication 1 ea QID RIGHT EYE  Last administered on 4/12/19at 08:51;


Admin Dose 1 EA;  Start 4/6/19 at 17:00


Patient Own Medication 1 ea DAILY RIGHT EYE  Last administered on 4/12/19at 


08:52; Admin Dose 1 EA;  Start 4/7/19 at 09:00


Patient Own Medication 1 ea Q4 RIGHT EYE  Last administered on 4/12/19at 08:52; 


Admin Dose 1 EA;  Start 4/6/19 at 17:00


Lactulose (Enulose) 20 gm Q6 PO  Last administered on 4/12/19at 05:48; Admin Dos


e 20 GM;  Start 4/6/19 at 18:00


Miscellaneous Information 1 ea NOTE XX ;  Start 4/7/19 at 09:30


Glucose (Glutose) 15 gm Q15M  PRN PO DECREASED GLUCOSE;  Start 4/7/19 at 09:30


Glucose (Glutose) 22.5 gm Q15M  PRN PO DECREASED GLUCOSE;  Start 4/7/19 at 09:30


Dextrose (D50w Syringe) 25 ml Q15M  PRN IV DECREASED GLUCOSE;  Start 4/7/19 at 


09:30


Dextrose (D50w Syringe) 50 ml Q15M  PRN IV DECREASED GLUCOSE;  Start 4/7/19 at 


09:30


Glucagon (Glucagen) 1 mg Q15M  PRN IM DECREASED GLUCOSE;  Start 4/7/19 at 09:30


Glucose (Glutose) 15 gm Q15M  PRN BUCCAL DECREASED GLUCOSE;  Start 4/7/19 at 


09:30


Insulin Aspart (Novolog Insulin Pen) NOVOLOG *MILD* ALGORITHM WITH MEALS  


BEDTIME SC  Last administered on 4/11/19at 21:10; Admin Dose 2 UNIT;  Start 


4/9/19 at 18:05


Amlodipine Besylate (Norvasc) 5 mg DAILY PO  Last administered on 4/12/19at 


08:51; Admin Dose 5 MG;  Start 4/10/19 at 12:00


Metformin HCl (Glucophage) 1,000 mg BID WITH  MEALS PO  Last administered on 


4/12/19at 07:51; Admin Dose 1,000 MG;  Start 4/11/19 at 18:00


Albuterol/ Ipratropium (Duoneb) 3 ml Q6HWA RESP  THERAPY HHN  Last administered 


on 4/12/19at 07:42; Admin Dose 3 ML;  Start 4/11/19 at 20:00


Albuterol/ Ipratropium (Duoneb) 3 ml Q2H RESP THERAPY  PRN HHN shortness of 


breath;  Start 4/11/19 at 15:00


Budesonide (Pulmicort (Neb)) 0.5 mg BID RESP  THERAPY HHN  Last administered on 


4/12/19at 07:41; Admin Dose 0.5 MG;  Start 4/11/19 at 20:00





VTE Prophylaxis


Risk score (from Nsg)>0 risk:  3


SCD applied (from Ns):  Yes





Lines/Catheters


IV Catheter Type:  


Hawk in Place:  No





Assessment/Plan


Hospital Course


Subjective


patient bruised areas appears to be healing with no new areas, patient doing 


well with no nasal cannula 





Objective





Physical exam





General: Patient is laying in bed and answers questions appropriately


Mentation: Patient is alert and oriented to self and place and mildly to 


situation


Head: Normocephalic atraumatic


Eyes: EOMI, pupils reactive to light


Neck: Supple, nontender, midline


Respiratory: Clear to auscultation bilaterally


Cardiovascular: regular rate, no obvious murmurs


Gastrointestinal: non-tender to palpation, bowel sounds heard.


Neurological: Moves all extremities spontaneously


Skin: No new skin lesions





Assessment/Plan


Acute hepatic encephalopathy-resolved


- Patient mentation has returned to baseline


- CT head performed with no acute abnormalities


-Possibly in combination of elevated ammonia along with UTI.





Generalized bruising, improving


-Likely secondary to trauma and thrombocytopenia, which is secondary to 


cirrhosis


-Coag screen done, INR is mildly elevated 1.42


-No signs of  GI bleed however will monitor hemoglobin closely, occult fecal 


pending


-We will monitor closely, it appears patient is having excessive bruising sites 


usually at sites of IV insertion however there are other sites that are 


unexplained,.





anemia


-continues to decrease


-studies sent out, retic index showing hypoproliferation so far


-no apparent sources of bleed


-transfuse as needed





Hypoxia, resolving


-Mild hypoxia, with ambulation


-Chest x-ray done, indicative of small airway disease, DuoNeb and budesonide for


now


-ABG not showing signs of co2 retention





Cirrhosis


-[place]


-Continue Lasix and spironolactone





Urinary retention/UTI


- possible UTI seen on UA, low leuk but high wbc.  urine cultures negative, 


patient received 3 doses of ceftriaxone, will monitor off abx.


- may have contributed to AMS


- hawk in place, will dc


-IV abx will stop





DM


- A1c noted. 


-Patient appears to have a very complex history regarding her diabetes.  Patient


has been fluctuating with her sugar levels with very high doses of Lantus and 


insulin however the patient's daughter states that once she is on metformin she 


will need virtually no insulin.  On this new information, will attempt to 


restart metformin 1000 twice a day with insulin sliding scale as coverage taking


off the other insulin, will need to re-add new insulin if needed to correct 


excessive sugars.





Hypercalcemia, resolved


- possibly due to dehydration. IVF given. monitor





Hyperammonemia- improving


- 102 earlier this week, now around 30


- continue on lactulose and rifaximin. Increasing lactulose until passing BM and


then will titrate for 3-4 BM a day





h/o Glaucoma


- continue home eye drops


- hold off on Diamox for now given patients confusion started after medication.





Disposition


- Will adjust Lactulose dose for 3-4 BMs daily 


-Patient's hemoglobin still lowering. studies pending, transfuse as needed











DAVID DEL TORO                    Apr 12, 2019 12:46

## 2019-04-13 VITALS — RESPIRATION RATE: 16 BRPM | SYSTOLIC BLOOD PRESSURE: 137 MMHG | DIASTOLIC BLOOD PRESSURE: 60 MMHG | HEART RATE: 100 BPM

## 2019-04-13 VITALS — HEART RATE: 95 BPM | DIASTOLIC BLOOD PRESSURE: 64 MMHG | RESPIRATION RATE: 19 BRPM | SYSTOLIC BLOOD PRESSURE: 136 MMHG

## 2019-04-13 VITALS — SYSTOLIC BLOOD PRESSURE: 126 MMHG | RESPIRATION RATE: 18 BRPM | HEART RATE: 94 BPM | DIASTOLIC BLOOD PRESSURE: 60 MMHG

## 2019-04-13 VITALS — DIASTOLIC BLOOD PRESSURE: 59 MMHG | HEART RATE: 103 BPM | RESPIRATION RATE: 18 BRPM | SYSTOLIC BLOOD PRESSURE: 129 MMHG

## 2019-04-13 LAB
ABNORMAL IP MESSAGE: 1
ADD MAN DIFF?: NO
ANION GAP: 13 (ref 5–13)
BASOPHIL #: 0 10^3/UL (ref 0–0.1)
BASOPHILS %: 0.5 % (ref 0–2)
BLOOD UREA NITROGEN: 24 MG/DL (ref 7–20)
CALCIUM: 9.3 MG/DL (ref 8.4–10.2)
CARBON DIOXIDE: 13 MMOL/L (ref 21–31)
CHLORIDE: 113 MMOL/L (ref 97–110)
CREATININE: 0.68 MG/DL (ref 0.44–1)
EOSINOPHILS #: 0.3 10^3/UL (ref 0–0.5)
EOSINOPHILS %: 3.2 % (ref 0–7)
GLUCOSE: 128 MG/DL (ref 70–220)
HAPTOGLOBIN: <8 MG/DL (ref 43–212)
HEMATOCRIT: 22.1 % (ref 37–47)
HEMOGLOBIN: 7.7 G/DL (ref 12–16)
IMMATURE GRANS #M: 0.04 10^3/UL (ref 0–0.03)
IMMATURE GRANS % (M): 0.5 % (ref 0–0.43)
LYMPHOCYTES #: 2.4 10^3/UL (ref 0.8–2.9)
LYMPHOCYTES %: 29.8 % (ref 15–51)
MAGNESIUM: 2 MG/DL (ref 1.7–2.5)
MEAN CORPUSCULAR HEMOGLOBIN: 31.6 PG (ref 29–33)
MEAN CORPUSCULAR HGB CONC: 34.8 G/DL (ref 32–37)
MEAN CORPUSCULAR VOLUME: 90.6 FL (ref 82–101)
MEAN PLATELET VOLUME: 10.4 FL (ref 7.4–10.4)
MONOCYTE #: 0.9 10^3/UL (ref 0.3–0.9)
MONOCYTES %: 10.9 % (ref 0–11)
NEUTROPHIL #: 4.4 10^3/UL (ref 1.6–7.5)
NEUTROPHILS %: 55.1 % (ref 39–77)
NUCLEATED RED BLOOD CELLS #: 0 10^3/UL (ref 0–0)
NUCLEATED RED BLOOD CELLS%: 0 /100WBC (ref 0–0)
PHOSPHORUS: 3.9 MG/DL (ref 2.5–4.9)
PLATELET COUNT: 75 10^3/UL (ref 140–415)
POSITIVE DIFF: (no result)
POTASSIUM: 4 MMOL/L (ref 3.5–5.1)
RED BLOOD COUNT: 2.44 10^6/UL (ref 4.2–5.4)
RED CELL DISTRIBUTION WIDTH: 16.8 % (ref 11.5–14.5)
SODIUM: 139 MMOL/L (ref 135–144)
WHITE BLOOD COUNT: 7.9 10^3/UL (ref 4.8–10.8)

## 2019-04-13 RX ADMIN — IPRATROPIUM BROMIDE AND ALBUTEROL SULFATE SCH ML: .5; 3 SOLUTION RESPIRATORY (INHALATION) at 08:00

## 2019-04-13 RX ADMIN — FLUTICASONE FUROATE AND VILANTEROL TRIFENATATE 1 INH: 100; 25 POWDER RESPIRATORY (INHALATION) at 16:15

## 2019-04-13 RX ADMIN — RIFAXIMIN 1 MG: 550 TABLET ORAL at 09:02

## 2019-04-13 RX ADMIN — FUROSEMIDE 1 MG: 20 TABLET ORAL at 09:02

## 2019-04-13 RX ADMIN — LACTULOSE 1 GM: 20 SOLUTION ORAL at 13:34

## 2019-04-13 RX ADMIN — LACTULOSE SCH GM: 10 SOLUTION ORAL at 13:34

## 2019-04-13 RX ADMIN — INSULIN ASPART 1 UNIT: 100 INJECTION, SOLUTION INTRAVENOUS; SUBCUTANEOUS at 09:05

## 2019-04-13 RX ADMIN — PANTOPRAZOLE SODIUM 1 MG: 40 TABLET, DELAYED RELEASE ORAL at 06:04

## 2019-04-13 RX ADMIN — INSULIN ASPART 1 UNIT: 100 INJECTION, SOLUTION INTRAVENOUS; SUBCUTANEOUS at 21:33

## 2019-04-13 RX ADMIN — LACTULOSE SCH GM: 10 SOLUTION ORAL at 18:23

## 2019-04-13 RX ADMIN — SPIRONOLACTONE SCH MG: 50 TABLET ORAL at 09:03

## 2019-04-13 RX ADMIN — RIFAXIMIN SCH MG: 550 TABLET ORAL at 09:02

## 2019-04-13 RX ADMIN — IPRATROPIUM BROMIDE AND ALBUTEROL SULFATE 1 ML: .5; 3 SOLUTION RESPIRATORY (INHALATION) at 19:45

## 2019-04-13 RX ADMIN — FERROUS SULFATE TAB 325 MG (65 MG ELEMENTAL FE) 1 MG: 325 (65 FE) TAB at 09:02

## 2019-04-13 RX ADMIN — AMLODIPINE BESYLATE 1 MG: 5 TABLET ORAL at 09:02

## 2019-04-13 RX ADMIN — INSULIN ASPART 1 UNIT: 100 INJECTION, SOLUTION INTRAVENOUS; SUBCUTANEOUS at 18:17

## 2019-04-13 RX ADMIN — AMLODIPINE BESYLATE SCH MG: 5 TABLET ORAL at 09:02

## 2019-04-13 RX ADMIN — FLUTICASONE FUROATE AND VILANTEROL TRIFENATATE SCH INH: 100; 25 POWDER RESPIRATORY (INHALATION) at 16:15

## 2019-04-13 RX ADMIN — BUDESONIDE 1 MG: 0.5 SUSPENSION RESPIRATORY (INHALATION) at 09:06

## 2019-04-13 RX ADMIN — LACTULOSE SCH GM: 10 SOLUTION ORAL at 00:52

## 2019-04-13 RX ADMIN — LACTULOSE 1 GM: 20 SOLUTION ORAL at 06:03

## 2019-04-13 RX ADMIN — SPIRONOLACTONE 1 MG: 50 TABLET, FILM COATED ORAL at 09:03

## 2019-04-13 RX ADMIN — LACTULOSE 1 GM: 20 SOLUTION ORAL at 00:52

## 2019-04-13 RX ADMIN — LACTULOSE SCH GM: 10 SOLUTION ORAL at 06:03

## 2019-04-13 RX ADMIN — IPRATROPIUM BROMIDE AND ALBUTEROL SULFATE 1 ML: .5; 3 SOLUTION RESPIRATORY (INHALATION) at 08:00

## 2019-04-13 RX ADMIN — BUDESONIDE SCH MG: 0.5 SUSPENSION RESPIRATORY (INHALATION) at 09:06

## 2019-04-13 RX ADMIN — PANTOPRAZOLE SODIUM SCH MG: 40 TABLET, DELAYED RELEASE ORAL at 06:04

## 2019-04-13 RX ADMIN — FERROUS SULFATE TAB 325 MG (65 MG ELEMENTAL FE) SCH MG: 325 (65 FE) TAB at 09:02

## 2019-04-13 RX ADMIN — INSULIN ASPART 1 UNIT: 100 INJECTION, SOLUTION INTRAVENOUS; SUBCUTANEOUS at 13:42

## 2019-04-13 RX ADMIN — LACTULOSE 1 GM: 20 SOLUTION ORAL at 18:23

## 2019-04-13 NOTE — PN
Date/Time of Note


Date/Time of Note


DATE: 4/13/19 


TIME: 14:22





Objective


Vitals





Vital Signs


  Date      Temp  Pulse  Resp  B/P (MAP)   Pulse Ox  O2          O2 Flow    FiO2


Time                                                 Delivery    Rate


   4/13/19  98.2    100    16      137/60        98  Room Air


     14:19                           (85)


   4/13/19                                                                    21


     09:05








Results


Result Diagram:  


4/13/19 0426                                                                    


           4/13/19 0426








Medications


Medications





Current Medications


Ferrous Sulfate (Ferrous Sulfate (Ec)) 325 mg DAILY PO  Last administered on 


4/13/19at 09:02; Admin Dose 325 MG;  Start 4/6/19 at 09:00


Furosemide (Lasix) 20 mg DAILY PO  Last administered on 4/13/19at 09:02; Admin 


Dose 20 MG;  Start 4/6/19 at 09:00


Meclizine HCl (Antivert) 25 mg DAILY  PRN PO DIZZINESS;  Start 4/5/19 at 19:00


Pantoprazole (Protonix Tab) 40 mg AC  BREAKFAST PO  Last administered on 


4/13/19at 06:04; Admin Dose 40 MG;  Start 4/6/19 at 07:30


Rifaximin (Xifaxan) 550 mg DAILY PO  Last administered on 4/13/19at 09:02; Admin


Dose 550 MG;  Start 4/6/19 at 09:00


Spironolactone (Aldactone) 50 mg DAILY PO  Last administered on 4/13/19at 09:03;


Admin Dose 50 MG;  Start 4/6/19 at 09:00


Acetazolamide (Diamox Sequels) 500 mg BID PO  Last administered on 4/6/19at 


10:47; Admin Dose 500 MG;  Start 4/5/19 at 21:00;  Status Hold


Patient Own Medication 1 ea QID RIGHT EYE  Last administered on 4/13/19at 08:59;


Admin Dose 1 EA;  Start 4/6/19 at 17:00


Patient Own Medication 1 ea DAILY RIGHT EYE  Last administered on 4/13/19at 


08:59; Admin Dose 1 EA;  Start 4/7/19 at 09:00


Patient Own Medication 1 ea Q4 RIGHT EYE  Last administered on 4/13/19at 13:34; 


Admin Dose 1 EA;  Start 4/6/19 at 17:00


Lactulose (Enulose) 20 gm Q6 PO  Last administered on 4/13/19at 13:34; Admin 


Dose 20 GM;  Start 4/6/19 at 18:00


Miscellaneous Information 1 ea NOTE XX ;  Start 4/7/19 at 09:30


Glucose (Glutose) 15 gm Q15M  PRN PO DECREASED GLUCOSE;  Start 4/7/19 at 09:30


Glucose (Glutose) 22.5 gm Q15M  PRN PO DECREASED GLUCOSE;  Start 4/7/19 at 09:30


Dextrose (D50w Syringe) 25 ml Q15M  PRN IV DECREASED GLUCOSE;  Start 4/7/19 at 


09:30


Dextrose (D50w Syringe) 50 ml Q15M  PRN IV DECREASED GLUCOSE;  Start 4/7/19 at 


09:30


Glucagon (Glucagen) 1 mg Q15M  PRN IM DECREASED GLUCOSE;  Start 4/7/19 at 09:30


Glucose (Glutose) 15 gm Q15M  PRN BUCCAL DECREASED GLUCOSE;  Start 4/7/19 at 


09:30


Insulin Aspart (Novolog Insulin Pen) NOVOLOG *MILD* ALGORITHM WITH MEALS  


BEDTIME SC  Last administered on 4/13/19at 13:42; Admin Dose 2 UNIT;  Start 


4/9/19 at 18:05


Amlodipine Besylate (Norvasc) 5 mg DAILY PO  Last administered on 4/13/19at 


09:02; Admin Dose 5 MG;  Start 4/10/19 at 12:00


Metformin HCl (Glucophage) 1,000 mg BID WITH  MEALS PO  Last administered on 


4/13/19at 09:03; Admin Dose 1,000 MG;  Start 4/11/19 at 18:00


Albuterol/ Ipratropium (Duoneb) 3 ml Q2H RESP THERAPY  PRN HHN shortness of 


breath;  Start 4/11/19 at 15:00


Fluticasone/ Vilanterol (Breo Ellipta 100-25 Mcg Inh) 1 inh DAILY INH ;  Start 


4/13/19 at 14:00





VTE Prophylaxis


Risk score (from Nsg)>0 risk:  4


SCD applied (from Nsg):  Yes





Lines/Catheters


IV Catheter Type:  


Fernández in Place:  No





Assessment/Plan


Hospital Course


Subjective


patient bruised areas appears to continue healing with no new areas, patient 


doing well with no nasal cannula 





Objective





Physical exam





General: Patient is laying in bed and answers questions appropriately


Mentation: Patient is alert and oriented to self and place and mildly to 


situation


Head: Normocephalic atraumatic


Eyes: EOMI, pupils reactive to light


Neck: Supple, nontender, midline


Respiratory: Clear to auscultation bilaterally


Cardiovascular: regular rate, no obvious murmurs


Gastrointestinal: non-tender to palpation, bowel sounds heard.


Neurological: Moves all extremities spontaneously


Skin: No new skin lesions





Assessment/Plan


Acute hepatic encephalopathy-resolved


- Patient mentation has returned to baseline


- CT head performed with no acute abnormalities


-Possibly in combination of elevated ammonia along with UTI.





Generalized bruising, improving


-Likely secondary to trauma and thrombocytopenia, which is secondary to 


cirrhosis


-Coag screen done, INR is mildly elevated 1.42


-No signs of  GI bleed however will monitor hemoglobin closely, occult fecal 


negative, was positive earlier during this admission however there was no 


significant cause uncertain for GI bleed at that time due to stable hemoglobin 


levels.  After speaking with patient's family, we decided to go with 


conservative measures and monitor for now 


-We will monitor closely, it appears patient is having excessive bruising sites 


usually at sites of IV insertion however there are other sites that are 


unexplained,.





anemia


-Stable today


-studies sent out, retic index showing hypoproliferation, without significant 


iron deficiency


-no apparent sources of bleed, second fecal occult negative


-transfuse as needed, due to the discrepancy of hemoglobin on admission may need


to transfuse 1 unit before discharge.  As, if patient continues to bruise due to


thrombocytopenia her levels will quickly dropped to less than 7.0





Hypoxia, resolving


-Mild hypoxia, with ambulation


-Chest x-ray done, indicative of small airway disease, change nebulizers to Brio


-ABG not showing signs of co2 retention





Cirrhosis


-[place]


-Continue Lasix and spironolactone


-Status post TIPS





Urinary retention/UTI


- possible UTI seen on UA, low leuk but high wbc.  urine cultures negative, 


patient received 3 doses of ceftriaxone, will monitor off abx.


- may have contributed to AMS


-Fernández discontinued


-IV abx will stop





DM


- A1c noted. 


-Patient appears to have a very complex history regarding her diabetes.  Patient


has been fluctuating with her sugar levels with very high doses of Lantus and 


insulin however the patient's daughter states that once she is on metformin she 


will need virtually no insulin.  On this new information, will attempt to 


restart metformin 1000 twice a day with insulin sliding scale as coverage taking


off the other insulin, will need to re-add new insulin if needed to correct 


excessive sugars.





Hypercalcemia, resolved


- possibly due to dehydration. IVF given. monitor





Hyperammonemia- improving


- 102 earlier this week, now around 30


- continue on lactulose and rifaximin. Increasing lactulose until passing BM and


then will titrate for 3-4 BM a day





h/o Glaucoma


- continue home eye drops


- hold off on Diamox for now given patients confusion started after medication.





Disposition


- Will adjust Lactulose dose for 3-4 BMs daily 


-Patient's hemoglobin needs to continue to be monitored, patient's family now 


wants home health physical therapy instead of air UDAVID BEYER                    Apr 13, 2019 14:29

## 2019-04-14 VITALS — RESPIRATION RATE: 18 BRPM | HEART RATE: 96 BPM | SYSTOLIC BLOOD PRESSURE: 122 MMHG | DIASTOLIC BLOOD PRESSURE: 54 MMHG

## 2019-04-14 VITALS — DIASTOLIC BLOOD PRESSURE: 57 MMHG | HEART RATE: 94 BPM | RESPIRATION RATE: 16 BRPM | SYSTOLIC BLOOD PRESSURE: 134 MMHG

## 2019-04-14 VITALS — DIASTOLIC BLOOD PRESSURE: 55 MMHG | HEART RATE: 97 BPM | RESPIRATION RATE: 17 BRPM | SYSTOLIC BLOOD PRESSURE: 135 MMHG

## 2019-04-14 LAB
ABNORMAL IP MESSAGE: 1
ADD MAN DIFF?: NO
ANION GAP: 15 (ref 5–13)
BASOPHIL #: 0.1 10^3/UL (ref 0–0.1)
BASOPHILS %: 0.7 % (ref 0–2)
BLOOD UREA NITROGEN: 30 MG/DL (ref 7–20)
CALCIUM: 9.9 MG/DL (ref 8.4–10.2)
CARBON DIOXIDE: 12 MMOL/L (ref 21–31)
CHLORIDE: 113 MMOL/L (ref 97–110)
CREATININE: 0.74 MG/DL (ref 0.44–1)
EOSINOPHILS #: 0.4 10^3/UL (ref 0–0.5)
EOSINOPHILS %: 3.5 % (ref 0–7)
GLUCOSE: 153 MG/DL (ref 70–220)
HEMATOCRIT: 23.9 % (ref 37–47)
HEMOGLOBIN: 8.4 G/DL (ref 12–16)
IMMATURE GRANS #M: 0.1 10^3/UL (ref 0–0.03)
IMMATURE GRANS % (M): 0.9 % (ref 0–0.43)
LYMPHOCYTES #: 3.4 10^3/UL (ref 0.8–2.9)
LYMPHOCYTES %: 30.6 % (ref 15–51)
MAGNESIUM: 2.1 MG/DL (ref 1.7–2.5)
MEAN CORPUSCULAR HEMOGLOBIN: 32.3 PG (ref 29–33)
MEAN CORPUSCULAR HGB CONC: 35.1 G/DL (ref 32–37)
MEAN CORPUSCULAR VOLUME: 91.9 FL (ref 82–101)
MEAN PLATELET VOLUME: 10.1 FL (ref 7.4–10.4)
MONOCYTE #: 1.2 10^3/UL (ref 0.3–0.9)
MONOCYTES %: 11.2 % (ref 0–11)
NEUTROPHIL #: 5.8 10^3/UL (ref 1.6–7.5)
NEUTROPHILS %: 53.1 % (ref 39–77)
NUCLEATED RED BLOOD CELLS #: 0 10^3/UL (ref 0–0)
NUCLEATED RED BLOOD CELLS%: 0 /100WBC (ref 0–0)
PHOSPHORUS: 4.6 MG/DL (ref 2.5–4.9)
PLATELET COUNT: 76 10^3/UL (ref 140–415)
POSITIVE DIFF: (no result)
POTASSIUM: 4.2 MMOL/L (ref 3.5–5.1)
RED BLOOD COUNT: 2.6 10^6/UL (ref 4.2–5.4)
RED CELL DISTRIBUTION WIDTH: 18.6 % (ref 11.5–14.5)
SODIUM: 140 MMOL/L (ref 135–144)
WHITE BLOOD COUNT: 10.9 10^3/UL (ref 4.8–10.8)

## 2019-04-14 RX ADMIN — FLUTICASONE FUROATE AND VILANTEROL TRIFENATATE SCH INH: 100; 25 POWDER RESPIRATORY (INHALATION) at 08:15

## 2019-04-14 RX ADMIN — LACTULOSE 1 GM: 20 SOLUTION ORAL at 12:56

## 2019-04-14 RX ADMIN — RIFAXIMIN SCH MG: 550 TABLET ORAL at 08:16

## 2019-04-14 RX ADMIN — SPIRONOLACTONE SCH MG: 50 TABLET ORAL at 08:16

## 2019-04-14 RX ADMIN — LACTULOSE 1 GM: 20 SOLUTION ORAL at 00:10

## 2019-04-14 RX ADMIN — FUROSEMIDE 1 MG: 20 TABLET ORAL at 08:17

## 2019-04-14 RX ADMIN — RIFAXIMIN 1 MG: 550 TABLET ORAL at 08:16

## 2019-04-14 RX ADMIN — PANTOPRAZOLE SODIUM SCH MG: 40 TABLET, DELAYED RELEASE ORAL at 06:06

## 2019-04-14 RX ADMIN — LACTULOSE SCH GM: 10 SOLUTION ORAL at 12:56

## 2019-04-14 RX ADMIN — LACTULOSE 1 GM: 20 SOLUTION ORAL at 05:32

## 2019-04-14 RX ADMIN — LACTULOSE SCH GM: 10 SOLUTION ORAL at 00:10

## 2019-04-14 RX ADMIN — AMLODIPINE BESYLATE 1 MG: 5 TABLET ORAL at 08:16

## 2019-04-14 RX ADMIN — FERROUS SULFATE TAB 325 MG (65 MG ELEMENTAL FE) 1 MG: 325 (65 FE) TAB at 08:16

## 2019-04-14 RX ADMIN — FERROUS SULFATE TAB 325 MG (65 MG ELEMENTAL FE) SCH MG: 325 (65 FE) TAB at 08:16

## 2019-04-14 RX ADMIN — SPIRONOLACTONE 1 MG: 50 TABLET, FILM COATED ORAL at 08:16

## 2019-04-14 RX ADMIN — INSULIN ASPART 1 UNIT: 100 INJECTION, SOLUTION INTRAVENOUS; SUBCUTANEOUS at 08:14

## 2019-04-14 RX ADMIN — AMLODIPINE BESYLATE SCH MG: 5 TABLET ORAL at 08:16

## 2019-04-14 RX ADMIN — FLUTICASONE FUROATE AND VILANTEROL TRIFENATATE 1 INH: 100; 25 POWDER RESPIRATORY (INHALATION) at 08:15

## 2019-04-14 RX ADMIN — INSULIN ASPART 1 UNIT: 100 INJECTION, SOLUTION INTRAVENOUS; SUBCUTANEOUS at 12:59

## 2019-04-14 RX ADMIN — LACTULOSE SCH GM: 10 SOLUTION ORAL at 05:32

## 2019-04-14 RX ADMIN — PANTOPRAZOLE SODIUM 1 MG: 40 TABLET, DELAYED RELEASE ORAL at 06:06

## 2019-04-14 RX ADMIN — LACTULOSE SCH GM: 10 SOLUTION ORAL at 12:00

## 2019-04-14 RX ADMIN — LACTULOSE 1 GM: 20 SOLUTION ORAL at 12:00

## 2019-04-14 NOTE — PDOCDIS
Discharge Instructions


CONDITION


                [id number]
Patient Condition:  [id number]
Stable








FOLLOW UP/APPOINTMENTS


Follow-up Plan


1.  Please follow-up with your primary care provider as soon as possible


2.  Please do not restart acetazolamide until after you see your ophthalmologist


3.  Please continue home medications except for acetazolamide


4.  Please follow-up with your gastroenterologist as soon as possible for 


scheduled colonoscopy to rule out colon cancer for fecal occult that was 


positive


5.  Please follow-up with your cardiologist regarding patient's high heart rate.











DAVID DEL TORO                    Apr 14, 2019 12:40

## 2019-04-14 NOTE — DS
Date/Time of Note


Date/Time of Note


DATE: 4/14/19 


TIME: 12:47





Discharge Summary


Admission/Discharge Info


Admit Date/Time


Apr 5, 2019 at 17:21


Discharge Date/Time





Patient Condition:  Stable


Hospital Course


Patient is an Citizen of [place] female with a past medical history significant for 


moderate dementia, nonalcoholic cirrhosis, thrombus cytopenia, chronic shortness


of breath on exertion, diabetes mellitus, glaucoma who presents to [facility] for altered mental status.  Patient's ammonia level was 


extremely high as patient not been able to tolerate her usual lactulose dose due


to excessive diarrhea.  Patient also was found to have a small UTI and was 


treated with the appropriate length of IV antibiotic.  Patient's ammonia 


subsequently resolved the patient's mental status returned to normal limits.  


Patient stay did involve other workup, patient does have some degree of anemia, 


a mild drop was noticed when patient had excessive bruising likely secondary to 


IV insertions that did resolve.  Patient's anemia was monitored and once assured


that patient's hemoglobin was stable patient was discharged it is of note that 


patient's fecal occult was positive on earlier in test however the most recent 


one was negative.  GI consultation for colonoscopy was offered to patient and 


patient's family however they stated that they will follow-up with their GI 


doctor and get colonoscopy in the outpatient setting.  Risks of not getting 


colonoscopy right now including undiagnosed GI cancer was explained to the 


patient's family.  Originally it was plan to send patient to the acute rehab 


unit due to generalized debility however over the course of the stay, patient's 


family and patient changed her mind and stated that they will be able to provide


24-hour care at home and we will arrange home health physical therapy.  I urged 


patient's family to reconsider given patient's fragile state however patient's 


family stated that they will take full responsibility.  Patient is currently at 


baseline, patient does have a tachycardia that is not symptomatic and when 


offered cardiac workup, patient's family stated that they will follow-up with 


their primary care doctor and cardiologist instead of spending additional time 


for cardiac workup.  Patient is to continue on her previous medications except 


for her acetazolamide until she sees ophthalmologist.  Patient will also be 


discharged with a small dose of blood pressure medication as well as a 


prescription for Advair as patient likely does have some degree of airway 


disease given her  was a smoker in the past.  Patient otherwise is doing 


well and back to baseline, will be discharged under the care of family for 24-


hour care and we will arrange home health physical therapy.





Discharge diagnosis


Acute hepatic encephalopathy, resolved


Generalized bruising, improving


Anemia, stable


Hypoxia, resolving


Cirrhosis,


Urinary retention, resolved


Diabetes mellitus


Electrolyte derangement,


Glaucoma, chronic


Home Meds


Active Scripts


Salmeterol Xinaf/Fluticasone* (Advair*) 250-50 Diskus Inhaler, 1 INH INHALATION 


BID, #1 INHALER


   Prov:DAVID DEL TORO         4/14/19


Lactulose* (Lactulose*) 20 Gm/30 Ml Solution, 20 GM PO Q6 for 30 Days


   Prov:DAVID DEL TORO         4/14/19


Amlodipine Besylate* (Amlodipine Besylate*) 5 Mg Tablet, 5 MG PO DAILY for 30 


Days, #30 TAB


   Prov:DAVID DEL TORO         4/14/19


Reported Medications


Nepafenac (ILEVRO) 1.7 Ml Drops.susp, 1.7 ML OP


   4/5/19


Besifloxacin Hydrochloride (Besivance) 5 Ml Drops.susp, 5 ML OP


   4/5/19


Difluprednate (Durezol) 5 Ml Drops, 5 ML OP, BOTTLE


   4/5/19


Insulin Aspart* (Novolog Insulin Pen*) 100 Unit/Ml Soln, 12 UNIT SC, EA


   THE DAUGHTER UNABLE TO VERIFIED FREQUENCY


   4/5/19


Furosemide* (Furosemide*) 20 Mg Tablet, 20 MG PO DAILY, #60 TAB


   4/5/19


Spironolactone* (Aldactone*) 50 Mg Tablet, 50 MG PO DAILY, #30 TAB


   4/5/19


Metformin Hcl* (Metformin Hcl*) 1,000 Mg Tablet, 1000 MG PO WITH BREAKFAST, #30 


TAB


   4/5/19


Rifaximin* (Xifaxan*) 550 Mg Tablet, 550 MG PO DAILY, TAB


   4/5/19


Pantoprazole* (Pantoprazole*) 40 Mg Tablet.dr, 40 MG PO AC BREAKFAST, TAB


   4/5/19


Meclizine Hcl* (Meclizine Hcl*) 25 Mg Tablet, 25 MG PO DAILY PRN for DIZZINESS, 


TAB


   4/5/19


Ferrous Sulfate* (Ferrous Sulfate*) 325 Mg Tabec, 325 MG PO DAILY, TAB


   4/5/19


Discontinued Reported Medications


Insulin Detemir (Levemir) 100 Unit/1 Ml Vial, 60 UNITS


   THE DAUGHTER UNABLE TO VERIFIED FREQUENCY


   4/5/19


Acetazolamide* (Acetazolamide* ER) 500 Mg Capsule.er, 500 MG PO BID, #60 CAP


   4/5/19


Follow-up Plan


1.  Please follow-up with your primary care provider as soon as possible


2.  Please do not restart acetazolamide until after you see your ophthalmologist


3.  Please continue home medications except for acetazolamide


4.  Please follow-up with your gastroenterologist as soon as possible for 


scheduled colonoscopy to rule out colon cancer for fecal occult that was 


positive


5.  Please follow-up with your cardiologist regarding patient's high heart rate.


Primary Care Provider


Not On Staff Doctor


Time spent on discharge:   > 30 minutes


Pending Labs





Laboratory Tests


Test
              4/13/19
13:32   4/13/19
18:13   4/13/19
21:06   4/14/19
01:50


Bedside                      218             193             191             182


Glucose
          mg/dL
()  mg/dL
()


Test
              4/14/19
05:00   4/14/19
05:01   4/14/19
08:09  



Sodium Level
                140  
               
               



                 mmol/L
(135-144


                               )


Potassium                    4.2  
               
               



Level
           mmol/L
(3.5-5.1


                               )


Chloride Level
              113  
               
               



                 mmol/L
()


Carbon Dioxide                12  
               
               



Level
            mmol/L
(21-31)


Anion Gap             15 (5-13)


Blood Urea       30 mg/dl
(7-20)  
               
               



Nitrogen



Creatinine
                 0.74  
               
               



                 mg/dl
(0.44-1.0


                              0)


Est Glomerular   > 60             
               
               



Filtrat          mL/min
(>60)


Rate
mL/min


Glucose Level
               153  
               
               



                  mg/dl
()


Calcium Level
               9.9  
               
               



                 mg/dl
(8.4-10.2


                               )


Phosphorus                   4.6  
               
               



Level
           mg/dl
(2.5-4.9)


Magnesium                    2.1  
               
               



Level
           mg/dl
(1.7-2.5)


White Blood      
                          10.9  
               



Count
                            10^3/ul
(4.8-1


                                            0.8)


Red Blood        
                          2.60  
               



Count
                            10^6/ul
(4.20-


                                           5.40)


Hemoglobin
      
                           8.4  
               



                                  g/dl
(12.0-16.


                                              0)


Hematocrit
      
                          23.9  
               



                                   %
(37.0-47.0)


Mean             
                          91.9  
               



Corpuscular                       fl
(82.0-101.0


Volume
                                        )


Mean             
                          32.3  
               



Corpuscular                       pg
(29.0-33.0)


Hemoglobin



Mean             
                          35.1  
               



Corpuscular                       g/dl
(32.0-37.


Hemoglobin
Conc                               0)


ent


Red Cell         
                          18.6  
               



Distribution                       %
(11.5-14.5)


Width



Platelet Count
  
                            76  
               



                                  10^3/UL
(140-4


                                             15)


Mean Platelet    
                          10.1  
               



Volume
                            fl
(7.4-10.4)


Immature         
                         0.900  
               



Granulocytes %
                   %
(0.001-0.429


                                               )


Neutrophils %
   
                          53.1  
               



                                   %
(39.0-77.0)


Lymphocytes %
   
                          30.6  
               



                                   %
(15.0-51.0)


Monocytes %
     
                          11.2  
               



                                    %
(0.0-11.0)


Eosinophils %
   
                           3.5  
               



                                     %
(0.0-7.0)


Basophils %
     
                           0.7  
               



                                     %
(0.0-2.0)


Nucleated Red    
                           0.0  
               



Blood Cells %
                    /100WBC
(0.0-0


                                             .0)


Immature         
                         0.100  
               



Granulocytes #
                   10^3/ul
(0.0-0


                                           .031)


Neutrophils #
   
                           5.8  
               



                                  10^3/ul
(1.6-7


                                             .5)


Lymphocytes #
   
                           3.4  
               



                                  10^3/ul
(0.8-2


                                             .9)


Monocytes #
     
                           1.2  
               



                                  10^3/ul
(0.3-0


                                             .9)


Eosinophils #
   
                           0.4  
               



                                  10^3/ul
(0.0-0


                                             .5)


Basophils #
     
                           0.1  
               



                                  10^3/ul
(0.0-0


                                             .1)


Nucleated Red    
                           0.0  
               



Blood Cells #
                    10^3/ul
(0.0-0


                                             .0)


Bedside          
                
                          170  



Glucose
                                          mg/dL
()














DAVID DEL TORO                    Apr 14, 2019 12:47

## 2019-06-25 ENCOUNTER — HOSPITAL ENCOUNTER (INPATIENT)
Dept: HOSPITAL 91 - E/R | Age: 69
LOS: 3 days | Discharge: HOME | DRG: 441 | End: 2019-06-28
Payer: MEDICARE

## 2019-06-25 ENCOUNTER — HOSPITAL ENCOUNTER (INPATIENT)
Dept: HOSPITAL 10 - E/R | Age: 69
LOS: 3 days | Discharge: HOME | DRG: 441 | End: 2019-06-28
Attending: INTERNAL MEDICINE
Payer: MEDICARE

## 2019-06-25 VITALS
BODY MASS INDEX: 25.2 KG/M2 | HEIGHT: 65 IN | BODY MASS INDEX: 25.2 KG/M2 | HEIGHT: 65 IN | WEIGHT: 151.24 LBS | WEIGHT: 151.24 LBS

## 2019-06-25 VITALS — RESPIRATION RATE: 20 BRPM | HEART RATE: 88 BPM | DIASTOLIC BLOOD PRESSURE: 65 MMHG | SYSTOLIC BLOOD PRESSURE: 142 MMHG

## 2019-06-25 VITALS — SYSTOLIC BLOOD PRESSURE: 139 MMHG | DIASTOLIC BLOOD PRESSURE: 65 MMHG | HEART RATE: 84 BPM

## 2019-06-25 VITALS — DIASTOLIC BLOOD PRESSURE: 65 MMHG | RESPIRATION RATE: 19 BRPM | SYSTOLIC BLOOD PRESSURE: 145 MMHG | HEART RATE: 89 BPM

## 2019-06-25 VITALS — RESPIRATION RATE: 16 BRPM | HEART RATE: 79 BPM | SYSTOLIC BLOOD PRESSURE: 140 MMHG | DIASTOLIC BLOOD PRESSURE: 64 MMHG

## 2019-06-25 VITALS — HEART RATE: 91 BPM

## 2019-06-25 VITALS — HEART RATE: 85 BPM

## 2019-06-25 VITALS — HEART RATE: 90 BPM

## 2019-06-25 VITALS — SYSTOLIC BLOOD PRESSURE: 140 MMHG | RESPIRATION RATE: 16 BRPM | DIASTOLIC BLOOD PRESSURE: 64 MMHG | HEART RATE: 79 BPM

## 2019-06-25 VITALS — HEART RATE: 82 BPM

## 2019-06-25 DIAGNOSIS — E11.9: ICD-10-CM

## 2019-06-25 DIAGNOSIS — H40.9: ICD-10-CM

## 2019-06-25 DIAGNOSIS — Z79.4: ICD-10-CM

## 2019-06-25 DIAGNOSIS — D61.818: ICD-10-CM

## 2019-06-25 DIAGNOSIS — K72.90: Primary | ICD-10-CM

## 2019-06-25 DIAGNOSIS — R65.11: ICD-10-CM

## 2019-06-25 DIAGNOSIS — K74.60: ICD-10-CM

## 2019-06-25 DIAGNOSIS — E87.2: ICD-10-CM

## 2019-06-25 DIAGNOSIS — F02.80: ICD-10-CM

## 2019-06-25 DIAGNOSIS — G30.9: ICD-10-CM

## 2019-06-25 DIAGNOSIS — I10: ICD-10-CM

## 2019-06-25 DIAGNOSIS — N39.0: ICD-10-CM

## 2019-06-25 DIAGNOSIS — E86.0: ICD-10-CM

## 2019-06-25 DIAGNOSIS — D69.6: ICD-10-CM

## 2019-06-25 LAB
ABNORMAL IP MESSAGE: 1
ACETAMINOPHEN: < 10 UG/ML (ref 10–30)
ADD MAN DIFF?: NO
ADD UMIC: YES
ALANINE AMINOTRANSFERASE: 34 IU/L (ref 13–69)
ALBUMIN/GLOBULIN RATIO: 1.03
ALBUMIN: 3.2 G/DL (ref 3.3–4.9)
ALKALINE PHOSPHATASE: 130 IU/L (ref 42–121)
AMMONIA: 254 UMOL/L (ref 9–30)
AMPHETAMINE/METHAMPHETAMINE: NEGATIVE
ANION GAP: 6 (ref 5–13)
ASPARTATE AMINO TRANSFERASE: 41 IU/L (ref 15–46)
BARBITURATES: NEGATIVE
BASOPHIL #: 0 10^3/UL (ref 0–0.1)
BASOPHILS %: 0.7 % (ref 0–2)
BENZODIAZEPINES: NEGATIVE
BILIRUBIN,DIRECT: 0 MG/DL (ref 0–0.2)
BILIRUBIN,TOTAL: 1.1 MG/DL (ref 0.2–1.3)
BLOOD UREA NITROGEN: 20 MG/DL (ref 7–20)
CALCIUM: 9.4 MG/DL (ref 8.4–10.2)
CANNABINOIDS: NEGATIVE
CARBON DIOXIDE: 23 MMOL/L (ref 21–31)
CHLORIDE: 113 MMOL/L (ref 97–110)
COCAINE: NEGATIVE
CREATININE: 0.88 MG/DL (ref 0.44–1)
EOSINOPHILS #: 0.1 10^3/UL (ref 0–0.5)
EOSINOPHILS %: 1.9 % (ref 0–7)
ETHANOL: < 10 MG/DL (ref 0–0)
GLOBULIN: 3.1 G/DL (ref 1.3–3.2)
GLUCOSE: 316 MG/DL (ref 70–220)
HEMATOCRIT: 25.3 % (ref 37–47)
HEMOGLOBIN: 8.5 G/DL (ref 12–16)
IMMATURE GRANS #M: 0.05 10^3/UL (ref 0–0.03)
IMMATURE GRANS % (M): 1.9 % (ref 0–0.43)
LACTIC ACID: 1.7 MMOL/L (ref 0.5–2)
LACTIC ACID: 2.2 MMOL/L (ref 0.5–2)
LYMPHOCYTES #: 0.7 10^3/UL (ref 0.8–2.9)
LYMPHOCYTES %: 26.2 % (ref 15–51)
MEAN CORPUSCULAR HEMOGLOBIN: 30.1 PG (ref 29–33)
MEAN CORPUSCULAR HGB CONC: 33.6 G/DL (ref 32–37)
MEAN CORPUSCULAR VOLUME: 89.7 FL (ref 82–101)
MEAN PLATELET VOLUME: 11.5 FL (ref 7.4–10.4)
MONOCYTE #: 0.3 10^3/UL (ref 0.3–0.9)
MONOCYTES %: 10.1 % (ref 0–11)
NEUTROPHIL #: 1.6 10^3/UL (ref 1.6–7.5)
NEUTROPHILS %: 59.2 % (ref 39–77)
NUCLEATED RED BLOOD CELLS #: 0 10^3/UL (ref 0–0)
NUCLEATED RED BLOOD CELLS%: 0 /100WBC (ref 0–0)
OPIATES: NEGATIVE
PLATELET COUNT: 56 10^3/UL (ref 140–415)
POSITIVE DIFF: (no result)
POTASSIUM: 4.2 MMOL/L (ref 3.5–5.1)
RED BLOOD COUNT: 2.82 10^6/UL (ref 4.2–5.4)
RED CELL DISTRIBUTION WIDTH: 14.8 % (ref 11.5–14.5)
SALICYLATE: < 1 MG/DL (ref 5–30)
SODIUM: 142 MMOL/L (ref 135–144)
TOTAL PROTEIN: 6.3 G/DL (ref 6.1–8.1)
UR ASCORBIC ACID: NEGATIVE MG/DL
UR BACTERIA: (no result) /HPF
UR BILIRUBIN (DIP): NEGATIVE MG/DL
UR BLOOD (DIP): (no result) MG/DL
UR BUDDING YEAST: (no result) /HPF
UR CLARITY: (no result)
UR COLOR: YELLOW
UR GLUCOSE (DIP): NEGATIVE MG/DL
UR HYALINE CAST: (no result) /HPF
UR KETONES (DIP): NEGATIVE MG/DL
UR LEUKOCYTE ESTERASE (DIP): (no result) LEU/UL
UR NITRITE (DIP): POSITIVE MG/DL
UR PH (DIP): 5 (ref 5–9)
UR RBC: 3 /HPF (ref 0–5)
UR SPECIFIC GRAVITY (DIP): 1.01 (ref 1–1.03)
UR TOTAL PROTEIN (DIP): NEGATIVE MG/DL
UR UROBILINOGEN (DIP): NEGATIVE MG/DL
UR WBC: 53 /HPF (ref 0–5)
WHITE BLOOD COUNT: 2.7 10^3/UL (ref 4.8–10.8)

## 2019-06-25 PROCEDURE — 80061 LIPID PANEL: CPT

## 2019-06-25 PROCEDURE — 97164 PT RE-EVAL EST PLAN CARE: CPT

## 2019-06-25 PROCEDURE — 80307 DRUG TEST PRSMV CHEM ANLYZR: CPT

## 2019-06-25 PROCEDURE — 83605 ASSAY OF LACTIC ACID: CPT

## 2019-06-25 PROCEDURE — 82140 ASSAY OF AMMONIA: CPT

## 2019-06-25 PROCEDURE — 93005 ELECTROCARDIOGRAM TRACING: CPT

## 2019-06-25 PROCEDURE — 71045 X-RAY EXAM CHEST 1 VIEW: CPT

## 2019-06-25 PROCEDURE — 84100 ASSAY OF PHOSPHORUS: CPT

## 2019-06-25 PROCEDURE — 81001 URINALYSIS AUTO W/SCOPE: CPT

## 2019-06-25 PROCEDURE — 80053 COMPREHEN METABOLIC PANEL: CPT

## 2019-06-25 PROCEDURE — 83036 HEMOGLOBIN GLYCOSYLATED A1C: CPT

## 2019-06-25 PROCEDURE — 92610 EVALUATE SWALLOWING FUNCTION: CPT

## 2019-06-25 PROCEDURE — 83735 ASSAY OF MAGNESIUM: CPT

## 2019-06-25 PROCEDURE — 84443 ASSAY THYROID STIM HORMONE: CPT

## 2019-06-25 PROCEDURE — 70450 CT HEAD/BRAIN W/O DYE: CPT

## 2019-06-25 PROCEDURE — 97167 OT EVAL HIGH COMPLEX 60 MIN: CPT

## 2019-06-25 PROCEDURE — 85025 COMPLETE CBC W/AUTO DIFF WBC: CPT

## 2019-06-25 PROCEDURE — 80048 BASIC METABOLIC PNL TOTAL CA: CPT

## 2019-06-25 PROCEDURE — 82962 GLUCOSE BLOOD TEST: CPT

## 2019-06-25 PROCEDURE — 97116 GAIT TRAINING THERAPY: CPT

## 2019-06-25 PROCEDURE — 92526 ORAL FUNCTION THERAPY: CPT

## 2019-06-25 RX ADMIN — CEFTRIAXONE 1 MLS/HR: 1 INJECTION, SOLUTION INTRAVENOUS at 07:41

## 2019-06-25 RX ADMIN — LIDOCAINE HYDROCHLORIDE 1 MLS/HR: 10 INJECTION, SOLUTION EPIDURAL; INFILTRATION; INTRACAUDAL; PERINEURAL at 04:25

## 2019-06-25 RX ADMIN — LACTULOSE 1 GM: 20 SOLUTION ORAL at 12:00

## 2019-06-25 RX ADMIN — LACTULOSE SCH GM: 10 SOLUTION ORAL at 06:31

## 2019-06-25 RX ADMIN — INSULIN ASPART 1 UNIT: 100 INJECTION, SOLUTION INTRAVENOUS; SUBCUTANEOUS at 14:34

## 2019-06-25 RX ADMIN — RIFAXIMIN 1 MG: 550 TABLET ORAL at 17:36

## 2019-06-25 RX ADMIN — LACTULOSE SCH GM: 10 SOLUTION ORAL at 17:36

## 2019-06-25 RX ADMIN — RIFAXIMIN SCH MG: 550 TABLET ORAL at 17:36

## 2019-06-25 RX ADMIN — INSULIN ASPART 1 UNIT: 100 INJECTION, SOLUTION INTRAVENOUS; SUBCUTANEOUS at 10:35

## 2019-06-25 RX ADMIN — CEFTRIAXONE SCH MLS/HR: 1 INJECTION, SOLUTION INTRAVENOUS at 07:41

## 2019-06-25 RX ADMIN — INSULIN ASPART 1 UNIT: 100 INJECTION, SOLUTION INTRAVENOUS; SUBCUTANEOUS at 21:32

## 2019-06-25 RX ADMIN — LACTULOSE SCH GM: 10 SOLUTION ORAL at 12:00

## 2019-06-25 RX ADMIN — LACTULOSE 1 GM: 20 SOLUTION ORAL at 17:36

## 2019-06-25 RX ADMIN — THIAMINE HYDROCHLORIDE 1 MLS/HR: 100 INJECTION, SOLUTION INTRAMUSCULAR; INTRAVENOUS at 05:49

## 2019-06-25 RX ADMIN — INSULIN ASPART 1 UNIT: 100 INJECTION, SOLUTION INTRAVENOUS; SUBCUTANEOUS at 17:44

## 2019-06-25 RX ADMIN — LACTULOSE 1 GM: 20 SOLUTION ORAL at 06:31

## 2019-06-25 RX ADMIN — LACTULOSE 1 ML: 10 SOLUTION ORAL; RECTAL at 06:31

## 2019-06-25 NOTE — HP
Date/Time of Note


Date/Time of Note


DATE: 6/25/19 


TIME: 06:45





Assessment/Plan


VTE Prophylaxis


SCD applied (from Nsg):  Yes


Pharmacological prophylaxis:  NA/contraindicated


Pharm contraindication:  low risk/ambulating





Lines/Catheters


IV Catheter Type (from Nrsg):  Saline Lock





Assessment/Plan


Hospital Course





This is a 69-year female being admitted to the telemetry floor for:





 1.  Acute acute on chronic encephalopathy: Possibly multifactorial secondary to


underlying UTI, hepatic encephalopathy.


Ammonia level significantly elevated at 254.  Patient also has evidence of 


possible UTI on urinalysis.  Will insert NG tube, confirm placement with chest 


x-ray.  Lactulose p.o. and per rectum with a goal of 3-4 BMs per day.  


Rifaximin.  Will monitor ammonia level.  Will need to confirm with family 


regarding patient's home medications.





2. UTI: ceftriaxone, await culture results.





3.  DM hemoglobin A1c was 5.3 on 4/5.  Given that she is n.p.o., will monitor 


hypoglycemic episodes with insulin sliding scale.





4.  Lactic Acidosis : lactic acid 3.1.  Likely secondary to dehydration, and 


metformin.  Will hold metformin.  And hydrate the patient.  Monitor lactic acid 


levels.





5.  Dehydration: Normal saline





6.  Nonalcoholic cirrhosis: Elevated ammonia level 254.  Will continue rifaximin


and lactulose at the current time.  Proceed with initiating other home 


medications once indicated





7.  Moderate dementia: Monitor closely, supportive care





8. h/o Glaucoma: We will need to confirm patient's home medications





9.  Hypertension: PRN hydralazine, resume oral medications when clinically 


indicated





10. DVT GI prophylaxis: SCDs, Protonix IV





Further treatment strategy will be implemented as per the clinical course


Result Diagram:  


6/25/19 0414                                                                    


           6/25/19 0414





Results 24hrs





Laboratory Tests


Test
              6/25/19
04:11  6/25/19
04:14     6/25/19
05:26  6/25/19
06:09


Bedside Glucose           325  H


White Blood Count                       2.7  #L


Red Blood Count                         2.82  L


Hemoglobin                               8.5  L


Hematocrit                              25.3  L


Mean Corpuscular                         89.7


Volume


Mean Corpuscular                         30.1


Hemoglobin


Mean Corpuscular   
                    33.6  
  
                 



Hemoglobin
Concen


t


Red Cell                               14.8  #H


Distribution


Width


Platelet Count                           56  #L


Mean Platelet                           11.5  H


Volume


Immature                               1.900  H


Granulocytes %


Neutrophils %                            59.2


Lymphocytes %                            26.2


Monocytes %                              10.1


Eosinophils %                             1.9


Basophils %                               0.7


Nucleated Red                             0.0


Blood Cells %


Immature                               0.050  H


Granulocytes #


Neutrophils #                             1.6


Lymphocytes #                            0.7  L


Monocytes #                               0.3


Eosinophils #                             0.1


Basophils #                               0.0


Nucleated Red                             0.0


Blood Cells #


Sodium Level                              142


Potassium Level                           4.2


Chloride Level                           113  H


Carbon Dioxide                             23


Level


Anion Gap                                   6


Blood Urea                                 20


Nitrogen


Creatinine                               0.88


Est Glomerular     
              > 60  
        
                 



Filtrat


Rate
mL/min


Glucose Level                            316  H


POC Venous                              3.1  *H


Lactate


Calcium Level                             9.4


Total Bilirubin                           1.1


Direct Bilirubin                         0.00


Indirect                                  1.1


Bilirubin


Aspartate Amino    
                      41  
  
                 



Transf
(AST/SGOT)


Alanine            
                      34  
  
                 



Aminotransferase



(ALT/SGPT)


Alkaline                                 130  H


Phosphatase


Ammonia                                 254  #H


Total Protein                             6.3


Albumin                                  3.2  L


Globulin                                 3.10


Albumin/Globulin                         1.03


Ratio


Salicylates Level                 < 1.0  L


Acetaminophen                     < 10.0  L


Level


Ethyl Alcohol                     < 10.0  H


Level


Urine Color                                      YELLOW


Urine Clarity
     
              
              SLIGHTLY
CLOUDY   



                                                 A


Urine pH                                                    5.0


Urine Specific                                            1.010


Gravity


Urine Ketones                                    NEGATIVE


Urine Nitrite                                    POSITIVE  A


Urine Bilirubin                                  NEGATIVE


Urine                                            NEGATIVE


Urobilinogen


Urine Leukocyte                                             1+  H


Esterase


Urine Microscopic                                             3


RBC


Urine Microscopic                                           53  H


WBC


Urine Bacteria                                   FEW  A


Urine Hyaline                                    FEW  A


Casts


Urine Yeast                                      MANY  A


(Budding)


Urine Hemoglobin                                            1+  H


Urine Glucose                                    NEGATIVE


Urine Total                                      NEGATIVE


Protein


Urine Opiates                                    NEGATIVE


Screen


Urine                                            NEGATIVE


Barbiturates


Urine                                            NEGATIVE


Amphetamines


Screen


Urine                                            NEGATIVE


Benzodiazepines


Screen


Urine Cocaine                                    NEGATIVE


Screen


Urine                                            NEGATIVE


Cannabinoids


Lactic Acid Level                                                          1.7








HPI/ROS


Admit Date/Time


Admit Date/Time








Hx of Present Illness


Chief complaint: Altered, nonverbal





The following history was obtained from the nursing documentation as well as 


from the previous chart as patient was unable to give a history given patient's 


altered mental status.





This is a 69-year-old female with a past medical history of moderate dementia, 


nonalcoholic cirrhosis, thrombocytopenia, chronic shortness of breath on 


exertion, diabetes mellitus, glaucoma who presented to the emergency department 


from home with symptoms of increasing altered status x1 today.  Patient 


apparently has per the family had worsening symptoms over the last hour prior to


being sent to the ER.  Patient was observed in the emergency department to be 


nonverbal with only gurgling noises and moaning.  The patient was withdrawing to


pain.  Patient does have a history of Alzheimer's and does normatively ambulate 


with a walker.  His blood sugar per EMS was 80.  Patient's ammonia level in the 


emergency department was noted to be 254.  Upon my examination of the patient at


the bedside patient does withdraw to pain and she does track with her eyes, 


however she is otherwise as described above.





Allergies: Aspirin, morphine





Medications: See MAR





FRANCISCA


Subjective hx not possible:  pt non-verbal (Altered)





PMH/Family/Social


Past Medical History


moderate dementia, nonalcoholic cirrhosis, thrombus cytopenia, chronic shortness


of breath on exertion, diabetes mellitus, glaucoma


Medications





Current Medications


Ondansetron HCl (Zofran Inj) 4 mg ER BRIDGE PRN IV NAUSEA/VOMITING;  Start 


6/25/19 at 05:30;  Stop 6/26/19 at 05:29


Acetaminophen (Tylenol Tab) 650 mg ER BRIDGE PRN PO .MILD PAIN 1-3 OR TEMP;  


Start 6/25/19 at 05:30;  Stop 6/26/19 at 05:29


Sodium Chloride 1,000 ml @  70 mls/hr [id number] IV  Last administered on 6/25/19at


05:49; Admin Dose 70 MLS/HR;  Start 6/25/19 at 05:36


IV Flush (NS 3 ml) 3 ml PER PROTOCOL IV ;  Start 6/25/19 at 06:00


Ondansetron HCl (Zofran Inj) 4 mg Q6H  PRN IV NAUSEA/VOMITING;  Start 6/25/19 at


06:00


Acetaminophen (Tylenol Supp) 650 mg Q6H  PRN VA .PAIN 1-3 OR TEMP;  Start 


6/25/19 at 06:00


Docusate Sodium (Colace) 100 mg Q12H  PRN PO .CONSTIPATION;  Start 6/25/19 at 


06:00


Bisacodyl (Dulcolax) 5 mg DAILY  PRN PO .CONSTIPATION;  Start 6/25/19 at 06:00


Lactulose (Lactulose Enema) 100 ml Q8 VA  Last administered on 6/25/19at 06:31; 


Admin Dose 100 ML;  Start 6/25/19 at 06:00


Lactulose (Enulose) 20 gm Q6 PO  Last administered on 6/25/19at 06:31; Admin 


Dose 20 GM;  Start 6/25/19 at 06:00


Ceftriaxone Sodium 50 ml @  100 mls/hr Q24H IVPB ;  Start 6/25/19 at 07:00;  


Status UNV


Coded Allergies:  


     morphine (Unverified  Allergy, Mild, 4/5/19)


   


   FACIAL REDNESS


     aspirin (Unverified  Adverse Reaction, Unknown, 4/5/19)





Past Surgical History


TIPS 2011


Past Surgical Hx:  other





Family History


Significant Family History:  no pertinent family hx





Social History


Smoking Status:  Unknown if ever smoked





Exam/Review of Systems


Vital Signs


Vitals





Vital Signs


  Date      Temp  Pulse  Resp  B/P (MAP)   Pulse Ox  O2          O2 Flow    FiO2


Time                                                 Delivery    Rate


   6/25/19           89    14      158/50       100  Room Air


     05:24                           (86)


   6/25/19  97.4


     03:59








Exam


Exam





General: Currently lying in bed, she does track with her eyes, she does retract 


the pain and moan to pain,  


HEENT: Atraumatic, normocephalic. The pupils are equal, round and reactive. 


Extraocular motor are intact, dry mucous membranes


Neck: Supple with full range of motion. No rigidity or meningismus


Chest: Nontender


Lungs: Clear to auscultation bilaterally no crackles rales or wheezing


Heart: Normal S1-S2, Regular rhythm and rate. No murmur, S3, or S4


Abdomen: Soft , nontender,  nondistended , bowel sounds are present. No guarding


no rebound tenderness , No masses or organomegaly. No costovertebral temporal 


angle mass


Extremities: Normal to inspection, no edema no cyanosis


Neurologic: Altered, tracks with her eyes, retract the pain, further 


neurological examination unable to be performed given patient's encephalopathy.


Additional Comments


PROCEDURE:   CT Brain without contrast. 


 


CLINICAL INDICATION:   Altered mental status


 


TECHNIQUE:   CT scan of the brain was performed on a multidetector high-


resolution CT scan. Axial imaging was obtained of the brain without contrast 


administration.  Coronal and sagittal reformatted images were obtained from the 


axial source images. Standard CT scan of the head without contrast protocols 


were performed. 


 


The total exam CTDI equals 39.64 mGy and the total exam DLP equals 713.51 mGy-


cm. 


 


One or more of the following dose reduction techniques were used:


- Automated exposure control.


- Adjustment of the mA and/or kV according to patient size.


Use of iterative reconstruction technique.


Dicom images are available


 


COMPARISON:   CT head without contrast 04/05/2019 


 


FINDINGS:   


 


The ventricular system and peripheral CSF spaces are proportionate prominent 


consistent with mild to moderate generalized cerebral and cerebellar volume 


loss. Mild periventricular deep white matter changes that is nonspecific and 


consistent with chronic microvascular ischemic disease. No evidence of 


intracranial masses hemorrhages or midline shift. Gray-white matter 


differentiation is unremarkable. The bones of the calvarium are intact. Calcific


atherosclerosis of the cavernous carotid arteries. The paranasal sinuses and 


mastoids are unremarkable. The bones of the calvarium are intact. Soft tissues 


unremarkable.


 


IMPRESSION:


 


1.  Mild to moderate generalized cerebral volume loss and mild nonspecific 


chronic microvascular ischemic disease.


2.  No evidence of intracranial masses hemorrhages or midline shift.


 


 


RPTAT:AAJJ


_____________________________________________ 


Physician Jesse           Date    Time 


Electronically viewed and signed by Physician Jesse on 06/25/2019 05:21 


 


D:  06/25/2019 05:21  T:  06/25/2019 05:21


BM/





CC: MARIANA MURRAY





[id number]


PROCEDURE:   XR Chest. 


 


CLINICAL INDICATION:   Altered mental status 


 


TECHNIQUE:  AP portable semi upright chest was obtained


 


COMPARISON:   Chest 11/12/2016 


 


FINDINGS:


Patient is rotated to the right. Hypoventilatory chest. Heart normal limits in 


size. No evidence of pulmonary vascular congestion acute lung consolidation 


pleural effusions and pneumothorax.


 


IMPRESSION:


No evidence of acute cardiopulmonary disease.


 


RPTAT:AAJJ


_____________________________________________ 


Physician Jesse           Date    Time 


Electronically viewed and signed by Physician Jesse on 06/25/2019 04:33 


 


D:  06/25/2019 04:33  T:  06/25/2019 04:33


BM/





CC: MARIANA MURRAY





[id number]











TODD RAMIREZ                 Jun 25, 2019 06:55

## 2019-06-25 NOTE — PN
Date/Time of Note


Date/Time of Note


DATE: 6/25/19 


TIME: 10:05





Assessment/Plan


VTE Prophylaxis


SCD applied (from Nsg):  Yes


Pharmacological prophylaxis:  NA/contraindicated


Pharm contraindication:  low risk/ambulating





Lines/Catheters


IV Catheter Type (from Nrsg):  Saline Lock





Assessment/Plan


Assessment/Plan


69-year woman with Alzheimer's and cirrhosis with hepatic encephalopathy 


admitted for severe lethargy. 





# acute on chronic encephalopathy: 


- multifactorial secondary to underlying UTI, hepatic encephalopathy.


- Ammonia level significantly elevated at 254.  Patient also has evidence of 


possible UTI on urinalysis.  


- NG tube in, start lactulose PO with goal 3-4 BMs per day. Also rifaximin. 





# DM 


- hemoglobin A1c was 5.3 on 4/5.  Given that she is n.p.o., will monitor 


hypoglycemic episodes with insulin sliding scale.





# Lactic Acidosis : 


- lactic acid 3.1.  Likely secondary to dehydration, and metformin.  Will hold 


metformin.  Avoid excessive crystalloids due to cirrhosis. 





# Nonalcoholic cirrhosis: 


- Elevated ammonia level 254.  Will continue rifaximin and lactulose at the 


current time.  Proceed with initiating other home medications once indicated





# Moderate dementia: Monitor closely, supportive care





# h/o Glaucoma: We will need to confirm patient's home medications





# Hypertension: PRN hydralazine, resume oral medications when clinically 


indicated





# DVT GI prophylaxis: SCDs, Protonix IV


Result Diagram:  


6/25/19 0414                                                                    


           6/25/19 0414








Subjective


24 Hr Interval Summary


Free Text/Dictation


No acute overnight events. 


Patient admitted to telemetry, still obtunded.





Exam/Review of Systems


Exam


Vitals





Vital Signs


  Date      Temp  Pulse  Resp  B/P (MAP)   Pulse Ox  O2          O2 Flow    FiO2


Time                                                 Delivery    Rate


   6/25/19  96.5     79    16      140/64        98  Room Air


     09:34                           (89)





Exam


General: Currently lying in bed, she does track with her eyes, she does retract 


the pain and moan to pain,  


HEENT: Atraumatic, normocephalic. The pupils are equal, round and reactive. 


Extraocular motor are intact, dry mucous membranes


Neck: Supple with full range of motion. No rigidity or meningismus


Chest: Nontender


Lungs: Clear to auscultation bilaterally no crackles rales or wheezing


Heart: Normal S1-S2, Regular rhythm and rate. No murmur, S3, or S4


Abdomen: Soft , nontender,  slightly distended , bowel sounds are present. No 


guarding no rebound tenderness , 


Extremities: Normal to inspection, no edema no cyanosis


Neurologic: Moves all extremities; grimaces to sternal rub. Eyes closed, 


breathing, protecting airway.





Results


Results 24hrs





Laboratory Tests


Test
              6/25/19
04:11  6/25/19
04:14     6/25/19
05:26  6/25/19
06:09


Bedside Glucose           325  H


White Blood Count                       2.7  #L


Red Blood Count                         2.82  L


Hemoglobin                               8.5  L


Hematocrit                              25.3  L


Mean Corpuscular                         89.7


Volume


Mean Corpuscular                         30.1


Hemoglobin


Mean Corpuscular   
                    33.6  
  
                 



Hemoglobin
Concen


t


Red Cell                               14.8  #H


Distribution


Width


Platelet Count                           56  #L


Mean Platelet                           11.5  H


Volume


Immature                               1.900  H


Granulocytes %


Neutrophils %                            59.2


Lymphocytes %                            26.2


Monocytes %                              10.1


Eosinophils %                             1.9


Basophils %                               0.7


Nucleated Red                             0.0


Blood Cells %


Immature                               0.050  H


Granulocytes #


Neutrophils #                             1.6


Lymphocytes #                            0.7  L


Monocytes #                               0.3


Eosinophils #                             0.1


Basophils #                               0.0


Nucleated Red                             0.0


Blood Cells #


Sodium Level                              142


Potassium Level                           4.2


Chloride Level                           113  H


Carbon Dioxide                             23


Level


Anion Gap                                   6


Blood Urea                                 20


Nitrogen


Creatinine                               0.88


Est Glomerular     
              > 60  
        
                 



Filtrat


Rate
mL/min


Glucose Level                            316  H


POC Venous                              3.1  *H


Lactate


Calcium Level                             9.4


Total Bilirubin                           1.1


Direct Bilirubin                         0.00


Indirect                                  1.1


Bilirubin


Aspartate Amino    
                      41  
  
                 



Transf
(AST/SGOT)


Alanine            
                      34  
  
                 



Aminotransferase



(ALT/SGPT)


Alkaline                                 130  H


Phosphatase


Ammonia                                 254  #H


Total Protein                             6.3


Albumin                                  3.2  L


Globulin                                 3.10


Albumin/Globulin                         1.03


Ratio


Salicylates Level                 < 1.0  L


Acetaminophen                     < 10.0  L


Level


Ethyl Alcohol                     < 10.0  H


Level


Urine Color                                      YELLOW


Urine Clarity
     
              
              SLIGHTLY
CLOUDY   



                                                 A


Urine pH                                                    5.0


Urine Specific                                            1.010


Gravity


Urine Ketones                                    NEGATIVE


Urine Nitrite                                    POSITIVE  A


Urine Bilirubin                                  NEGATIVE


Urine                                            NEGATIVE


Urobilinogen


Urine Leukocyte                                             1+  H


Esterase


Urine Microscopic                                             3


RBC


Urine Microscopic                                           53  H


WBC


Urine Bacteria                                   FEW  A


Urine Hyaline                                    FEW  A


Casts


Urine Yeast                                      MANY  A


(Budding)


Urine Hemoglobin                                            1+  H


Urine Glucose                                    NEGATIVE


Urine Total                                      NEGATIVE


Protein


Urine Opiates                                    NEGATIVE


Screen


Urine                                            NEGATIVE


Barbiturates


Urine                                            NEGATIVE


Amphetamines


Screen


Urine                                            NEGATIVE


Benzodiazepines


Screen


Urine Cocaine                                    NEGATIVE


Screen


Urine                                            NEGATIVE


Cannabinoids


Lactic Acid Level                                                          1.7


Test
              6/25/19
08:06  6/25/19
09:05  
                 



Lactic Acid Level        2.2  *H


Bedside Glucose                          298  H








Medications


Medication





Current Medications


Ondansetron HCl (Zofran Inj) 4 mg ER BRIDGE PRN IV NAUSEA/VOMITING;  Start 


6/25/19 at 05:30;  Stop 6/26/19 at 05:29


Acetaminophen (Tylenol Tab) 650 mg ER BRIDGE PRN PO .MILD PAIN 1-3 OR TEMP;  


Start 6/25/19 at 05:30;  Stop 6/26/19 at 05:29


Sodium Chloride 1,000 ml @  70 mls/hr [id number] IV  Last administered on 6/25/19at


05:49; Admin Dose 70 MLS/HR;  Start 6/25/19 at 05:36


IV Flush (NS 3 ml) 3 ml PER PROTOCOL IV ;  Start 6/25/19 at 06:00


Ondansetron HCl (Zofran Inj) 4 mg Q6H  PRN IV NAUSEA/VOMITING;  Start 6/25/19 at


06:00


Acetaminophen (Tylenol Supp) 650 mg Q6H  PRN CO .PAIN 1-3 OR TEMP;  Start 


6/25/19 at 06:00


Docusate Sodium (Colace) 100 mg Q12H  PRN PO .CONSTIPATION;  Start 6/25/19 at 


06:00


Bisacodyl (Dulcolax) 5 mg DAILY  PRN PO .CONSTIPATION;  Start 6/25/19 at 06:00


Lactulose (Lactulose Enema) 100 ml Q8 CO  Last administered on 6/25/19at 06:31; 


Admin Dose 100 ML;  Start 6/25/19 at 06:00


Lactulose (Enulose) 20 gm Q6 PO  Last administered on 6/25/19at 06:31; Admin 


Dose 20 GM;  Start 6/25/19 at 06:00


Ceftriaxone Sodium 50 ml @  100 mls/hr Q24H IVPB  Last administered on 6/25/19at


07:41; Admin Dose 100 MLS/HR;  Start 6/25/19 at 07:00


Diagnostic Test (Pha) (Accu-Chek) 1 ea 02 XX ;  Start 6/26/19 at 02:00


Insulin Aspart (Novolog Insulin Pen) NOVOLOG *MILD* ALGORI... Q4 SC ;  Start 


6/25/19 at 09:00


Rifaximin (Xifaxan) 550 mg DAILY PO ;  Start 6/25/19 at 09:00


Hydralazine HCl (Apresoline) 10 mg Q4H  PRN IV ELEVATED BLOOD PRESSURE;  Start 


6/25/19 at 07:00


Miscellaneous Information 1 ea NOTE XX ;  Start 6/25/19 at 07:30


Glucose (Glutose) 15 gm Q15M  PRN PO DECREASED GLUCOSE;  Start 6/25/19 at 07:30


Glucose (Glutose) 22.5 gm Q15M  PRN PO DECREASED GLUCOSE;  Start 6/25/19 at 


07:30


Dextrose (D50w Syringe) 25 ml Q15M  PRN IV DECREASED GLUCOSE;  Start 6/25/19 at 


07:30


Dextrose (D50w Syringe) 50 ml Q15M  PRN IV DECREASED GLUCOSE;  Start 6/25/19 at 


07:30


Glucagon (Glucagen) 1 mg Q15M  PRN IM DECREASED GLUCOSE;  Start 6/25/19 at 07:30


Glucose (Glutose) 15 gm Q15M  PRN BUCCAL DECREASED GLUCOSE;  Start 6/25/19 at 


07:30











PHYLLIS ELDRIDGE MD              Jun 25, 2019 10:09

## 2019-06-26 VITALS — RESPIRATION RATE: 17 BRPM | DIASTOLIC BLOOD PRESSURE: 65 MMHG | SYSTOLIC BLOOD PRESSURE: 144 MMHG | HEART RATE: 89 BPM

## 2019-06-26 VITALS — RESPIRATION RATE: 20 BRPM | DIASTOLIC BLOOD PRESSURE: 63 MMHG | SYSTOLIC BLOOD PRESSURE: 135 MMHG | HEART RATE: 85 BPM

## 2019-06-26 VITALS — DIASTOLIC BLOOD PRESSURE: 62 MMHG | SYSTOLIC BLOOD PRESSURE: 137 MMHG | HEART RATE: 90 BPM | RESPIRATION RATE: 20 BRPM

## 2019-06-26 VITALS — RESPIRATION RATE: 20 BRPM | HEART RATE: 85 BPM | DIASTOLIC BLOOD PRESSURE: 68 MMHG | SYSTOLIC BLOOD PRESSURE: 175 MMHG

## 2019-06-26 VITALS — RESPIRATION RATE: 18 BRPM | SYSTOLIC BLOOD PRESSURE: 149 MMHG | HEART RATE: 91 BPM | DIASTOLIC BLOOD PRESSURE: 66 MMHG

## 2019-06-26 VITALS — RESPIRATION RATE: 19 BRPM | DIASTOLIC BLOOD PRESSURE: 61 MMHG | SYSTOLIC BLOOD PRESSURE: 133 MMHG | HEART RATE: 95 BPM

## 2019-06-26 VITALS — HEART RATE: 92 BPM

## 2019-06-26 VITALS — HEART RATE: 86 BPM

## 2019-06-26 VITALS — HEART RATE: 86 BPM | DIASTOLIC BLOOD PRESSURE: 72 MMHG | SYSTOLIC BLOOD PRESSURE: 161 MMHG

## 2019-06-26 VITALS — HEART RATE: 94 BPM

## 2019-06-26 LAB
ABNORMAL IP MESSAGE: 1
ADD MAN DIFF?: NO
ALANINE AMINOTRANSFERASE: 38 IU/L (ref 13–69)
ALBUMIN/GLOBULIN RATIO: 1.1
ALBUMIN: 3.3 G/DL (ref 3.3–4.9)
ALKALINE PHOSPHATASE: 94 IU/L (ref 42–121)
AMMONIA: 39 UMOL/L (ref 9–30)
ANION GAP: 12 (ref 5–13)
ASPARTATE AMINO TRANSFERASE: 56 IU/L (ref 15–46)
BASOPHIL #: 0 10^3/UL (ref 0–0.1)
BASOPHILS %: 0.4 % (ref 0–2)
BILIRUBIN,DIRECT: 0 MG/DL (ref 0–0.2)
BILIRUBIN,TOTAL: 1.8 MG/DL (ref 0.2–1.3)
BLOOD UREA NITROGEN: 18 MG/DL (ref 7–20)
CALCIUM: 9.9 MG/DL (ref 8.4–10.2)
CARBON DIOXIDE: 23 MMOL/L (ref 21–31)
CHLORIDE: 115 MMOL/L (ref 97–110)
CHOL/HDL RATIO: 3.2 RATIO
CHOLESTEROL: 138 MG/DL (ref 100–200)
CREATININE: 0.79 MG/DL (ref 0.44–1)
EOSINOPHILS #: 0.1 10^3/UL (ref 0–0.5)
EOSINOPHILS %: 1.3 % (ref 0–7)
GLOBULIN: 3 G/DL (ref 1.3–3.2)
GLUCOSE: 180 MG/DL (ref 70–220)
HDL CHOLESTEROL: 43 MG/DL (ref 33–92)
HEMATOCRIT: 25.7 % (ref 37–47)
HEMOGLOBIN A1C: 5.5 % (ref 0–5.9)
HEMOGLOBIN: 8.6 G/DL (ref 12–16)
IMMATURE GRANS #M: 0.02 10^3/UL (ref 0–0.03)
IMMATURE GRANS % (M): 0.4 % (ref 0–0.43)
LDL CHOLESTEROL,CALCULATED: 79 MG/DL
LYMPHOCYTES #: 1 10^3/UL (ref 0.8–2.9)
LYMPHOCYTES %: 21.2 % (ref 15–51)
MAGNESIUM: 1.9 MG/DL (ref 1.7–2.5)
MEAN CORPUSCULAR HEMOGLOBIN: 29.8 PG (ref 29–33)
MEAN CORPUSCULAR HGB CONC: 33.5 G/DL (ref 32–37)
MEAN CORPUSCULAR VOLUME: 88.9 FL (ref 82–101)
MEAN PLATELET VOLUME: 11.1 FL (ref 7.4–10.4)
MONOCYTE #: 0.4 10^3/UL (ref 0.3–0.9)
MONOCYTES %: 9.4 % (ref 0–11)
NEUTROPHIL #: 3.1 10^3/UL (ref 1.6–7.5)
NEUTROPHILS %: 67.3 % (ref 39–77)
NUCLEATED RED BLOOD CELLS #: 0 10^3/UL (ref 0–0)
NUCLEATED RED BLOOD CELLS%: 0 /100WBC (ref 0–0)
PLATELET COUNT: 58 10^3/UL (ref 140–415)
POSITIVE DIFF: (no result)
POTASSIUM: 3.7 MMOL/L (ref 3.5–5.1)
RED BLOOD COUNT: 2.89 10^6/UL (ref 4.2–5.4)
RED CELL DISTRIBUTION WIDTH: 15.3 % (ref 11.5–14.5)
SODIUM: 150 MMOL/L (ref 135–144)
THYROID STIMULATING HORMONE: 1.17 MIU/L (ref 0.47–4.68)
TOTAL PROTEIN: 6.3 G/DL (ref 6.1–8.1)
TRIGLYCERIDES: 81 MG/DL (ref 0–149)
WHITE BLOOD COUNT: 4.6 10^3/UL (ref 4.8–10.8)

## 2019-06-26 RX ADMIN — INSULIN ASPART 1 UNIT: 100 INJECTION, SOLUTION INTRAVENOUS; SUBCUTANEOUS at 05:17

## 2019-06-26 RX ADMIN — INSULIN ASPART 1 UNIT: 100 INJECTION, SOLUTION INTRAVENOUS; SUBCUTANEOUS at 01:05

## 2019-06-26 RX ADMIN — INSULIN ASPART 1 UNIT: 100 INJECTION, SOLUTION INTRAVENOUS; SUBCUTANEOUS at 13:10

## 2019-06-26 RX ADMIN — LACTULOSE 1 GM: 20 SOLUTION ORAL at 17:26

## 2019-06-26 RX ADMIN — LACTULOSE SCH GM: 10 SOLUTION ORAL at 12:21

## 2019-06-26 RX ADMIN — RIFAXIMIN SCH MG: 550 TABLET ORAL at 09:14

## 2019-06-26 RX ADMIN — LACTULOSE 1 GM: 20 SOLUTION ORAL at 12:21

## 2019-06-26 RX ADMIN — INSULIN ASPART 1 UNIT: 100 INJECTION, SOLUTION INTRAVENOUS; SUBCUTANEOUS at 17:37

## 2019-06-26 RX ADMIN — LACTULOSE 1 GM: 20 SOLUTION ORAL at 00:19

## 2019-06-26 RX ADMIN — INSULIN ASPART 1 UNIT: 100 INJECTION, SOLUTION INTRAVENOUS; SUBCUTANEOUS at 09:26

## 2019-06-26 RX ADMIN — LACTULOSE 1 GM: 20 SOLUTION ORAL at 06:34

## 2019-06-26 RX ADMIN — CEFTRIAXONE 1 MLS/HR: 1 INJECTION, SOLUTION INTRAVENOUS at 06:55

## 2019-06-26 RX ADMIN — LACTULOSE SCH GM: 10 SOLUTION ORAL at 17:26

## 2019-06-26 RX ADMIN — CEFTRIAXONE SCH MLS/HR: 1 INJECTION, SOLUTION INTRAVENOUS at 06:55

## 2019-06-26 RX ADMIN — LACTULOSE SCH GM: 10 SOLUTION ORAL at 06:34

## 2019-06-26 RX ADMIN — LACTULOSE SCH GM: 10 SOLUTION ORAL at 00:19

## 2019-06-26 RX ADMIN — INSULIN ASPART 1 UNIT: 100 INJECTION, SOLUTION INTRAVENOUS; SUBCUTANEOUS at 21:02

## 2019-06-26 RX ADMIN — RIFAXIMIN 1 MG: 550 TABLET ORAL at 09:14

## 2019-06-26 NOTE — PN
Date/Time of Note


Date/Time of Note


DATE: 6/26/19 


TIME: 15:38





Assessment/Plan


VTE Prophylaxis


Risk score (from Ns)>0 risk:  6


SCD applied (from Ns):  Yes


Pharmacological prophylaxis:  NA/contraindicated


Pharm contraindication:  low risk/ambulating





Lines/Catheters


IV Catheter Type (from [facility]):  Peripheral IV


Urinary Cath still in place:  Yes


Reason Cath still needed:  other (indicate) (not needed)





Assessment/Plan


Assessment/Plan


69-year woman with Alzheimer's and cirrhosis with hepatic encephalopathy 


admitted for severe lethargy. 





# acute on chronic encephalopathy: 


- multifactorial secondary to underlying UTI, hepatic encephalopathy.


- Ammonia level significantly elevated at 254.  Patient also has evidence of 


possible UTI on urinalysis.  


- NG tube out. Continue lactulose. 





# DM 


- hemoglobin A1c was 5.3 on 4/5.  Given that she is n.p.o., will monitor 


hypoglycemic episodes with insulin sliding scale.





# Lactic Acidosis : 


- lactic acid 3.1.  Likely secondary to dehydration, and metformin.  Will hold 


metformin.  Avoid excessive crystalloids due to cirrhosis. 





# Nonalcoholic cirrhosis: 


- Elevated ammonia level 254.  Will continue rifaximin and lactulose at the 


current time.  Proceed with initiating other home medications once indicated





# Moderate dementia: Monitor closely, supportive care





# h/o Glaucoma: We will need to confirm patient's home medications





# Hypertension: PRN hydralazine, resume oral medications when clinically 


indicated





# DVT GI prophylaxis: SCDs, Protonix IV


Result Diagram:  


6/26/19 0505                                                                    


           6/26/19 0505








Subjective


24 Hr Interval Summary


Free Text/Dictation


Patient awake, doing well.


Passed bedside swallow eval





Exam/Review of Systems


Exam


Vitals





Vital Signs


  Date      Temp  Pulse  Resp  B/P (MAP)   Pulse Ox  O2          O2 Flow    FiO2


Time                                                 Delivery    Rate


   6/26/19           86


     12:09


   6/26/19                         161/72


     12:01                          (101)


   6/26/19  97.6           20                    98  Room Air


     11:13








Intake and Output





6/25/19 6/25/19 6/26/19





1515:00


23:00


07:00





IntakeIntake Total


150 ml


0 ml





OutputOutput Total


750 ml


800 ml





BalanceBalance


150 ml


-750 ml


-800 ml











Exam


General: Currently lying in bed, awake and alert. 


HEENT: Atraumatic, normocephalic. The pupils are equal, round and reactive. 


Extraocular motor are intact, dry mucous membranes


Neck: Supple with full range of motion. No rigidity or meningismus


Chest: Nontender


Lungs: Clear to auscultation bilaterally no crackles rales or wheezing


Heart: Normal S1-S2, Regular rhythm and rate. No murmur, S3, or S4


Abdomen: Soft , nontender,  slightly distended , bowel sounds are present. No 


guarding no rebound tenderness , 


Extremities: Normal to inspection, no edema no cyanosis





Results


Results 24hrs





Laboratory Tests


Test
                 6/25/19
17:35  6/25/19
21:22  6/26/19
00:52  6/26/19
05:04


Bedside Glucose               167            154            163            180


Test
                 6/26/19
05:05  6/26/19
08:52  6/26/19
13:03  



White Blood Count           4.6  #L


Red Blood Count             2.89  L


Hemoglobin                   8.6  L


Hematocrit                  25.7  L


Mean Corpuscular             88.9


Volume


Mean Corpuscular             29.8


Hemoglobin


Mean Corpuscular            33.5  
  
              
              



Hemoglobin
Concent


Red Cell                    15.3  H


Distribution Width


Platelet Count                58  L


Mean Platelet Volume        11.1  H


Immature                    0.400


Granulocytes %


Neutrophils %                67.3


Lymphocytes %                21.2


Monocytes %                   9.4


Eosinophils %                 1.3


Basophils %                   0.4


Nucleated Red Blood           0.0


Cells %


Immature                    0.020


Granulocytes #


Neutrophils #                 3.1


Lymphocytes #                 1.0


Monocytes #                   0.4


Eosinophils #                 0.1


Basophils #                   0.0


Nucleated Red Blood           0.0


Cells #


Sodium Level                 150  H


Potassium Level               3.7


Chloride Level               115  H


Carbon Dioxide Level           23


Anion Gap                      12


Blood Urea Nitrogen            18


Creatinine                   0.79


Est Glomerular        > 60  
        
              
              



Filtrat Rate
mL/min


Glucose Level                180  #


Hemoglobin A1c                5.5


Calcium Level                 9.9


Magnesium Level               1.9


Total Bilirubin              1.8  H


Direct Bilirubin             0.00


Indirect Bilirubin           1.8  H


Aspartate Amino              56  H
  
              
              



Transf
(AST/SGOT)


Alanine                       38  
  
              
              



Aminotransferase
(AL


T/SGPT)


Alkaline Phosphatase           94


Ammonia                      39  #H


Total Protein                 6.3


Albumin                       3.3


Globulin                     3.00


Albumin/Globulin             1.10


Ratio


Triglycerides Level            81


Cholesterol Level             138


LDL Cholesterol,               79


Calculated


HDL Cholesterol                43


Cholesterol/HDL               3.2


Ratio


Thyroid Stimulating        1.170  
  
              
              



Hormone
(TSH)


Bedside Glucose                              213            209








Medications


Medication





Current Medications


IV Flush (NS 3 ml) 3 ml PER PROTOCOL IV ;  Start 6/25/19 at 06:00


Ondansetron HCl (Zofran Inj) 4 mg Q6H  PRN IV NAUSEA/VOMITING;  Start 6/25/19 at


06:00


Acetaminophen (Tylenol Supp) 650 mg Q6H  PRN CO .PAIN 1-3 OR TEMP;  Start 6 /25/19 at 06:00


Docusate Sodium (Colace) 100 mg Q12H  PRN PO .CONSTIPATION;  Start 6/25/19 at 


06:00


Bisacodyl (Dulcolax) 5 mg DAILY  PRN PO .CONSTIPATION;  Start 6/25/19 at 06:00


Lactulose (Enulose) 20 gm Q6 PO  Last administered on 6/26/19at 12:21; Admin 


Dose 20 GM;  Start 6/25/19 at 06:00


Ceftriaxone Sodium 50 ml @  100 mls/hr Q24H IVPB  Last administered on 6/26/19at


06:55; Admin Dose 100 MLS/HR;  Start 6/25/19 at 07:00


Diagnostic Test (Pha) (Accu-Chek) 1 ea 02 XX ;  Start 6/26/19 at 02:00


Insulin Aspart (Novolog Insulin Pen) NOVOLOG *MILD* ALGORI... Q4 SC  Last 


administered on 6/26/19at 13:10; Admin Dose 2 UNIT;  Start 6/25/19 at 09:00


Rifaximin (Xifaxan) 550 mg DAILY PO  Last administered on 6/26/19at 09:14; Admin


Dose 550 MG;  Start 6/25/19 at 09:00


Hydralazine HCl (Apresoline) 10 mg Q4H  PRN IV ELEVATED BLOOD PRESSURE;  Start 


6/25/19 at 07:00


Miscellaneous Information 1 ea NOTE XX ;  Start 6/25/19 at 07:30


Glucose (Glutose) 15 gm Q15M  PRN PO DECREASED GLUCOSE;  Start 6/25/19 at 07:30


Glucose (Glutose) 22.5 gm Q15M  PRN PO DECREASED GLUCOSE;  Start 6/25/19 at 


07:30


Dextrose (D50w Syringe) 25 ml Q15M  PRN IV DECREASED GLUCOSE;  Start 6/25/19 at 


07:30


Dextrose (D50w Syringe) 50 ml Q15M  PRN IV DECREASED GLUCOSE;  Start 6/25/19 at 


07:30


Glucagon (Glucagen) 1 mg Q15M  PRN IM DECREASED GLUCOSE;  Start 6/25/19 at 07:30


Glucose (Glutose) 15 gm Q15M  PRN BUCCAL DECREASED GLUCOSE;  Start 6/25/19 at 


07:30











PHYLLIS ELDRIDGE MD              Jun 26, 2019 15:39

## 2019-06-27 VITALS — DIASTOLIC BLOOD PRESSURE: 61 MMHG | HEART RATE: 88 BPM | SYSTOLIC BLOOD PRESSURE: 138 MMHG | RESPIRATION RATE: 19 BRPM

## 2019-06-27 VITALS — SYSTOLIC BLOOD PRESSURE: 134 MMHG | DIASTOLIC BLOOD PRESSURE: 62 MMHG | HEART RATE: 83 BPM | RESPIRATION RATE: 18 BRPM

## 2019-06-27 VITALS — SYSTOLIC BLOOD PRESSURE: 139 MMHG | DIASTOLIC BLOOD PRESSURE: 68 MMHG | HEART RATE: 85 BPM | RESPIRATION RATE: 17 BRPM

## 2019-06-27 VITALS — SYSTOLIC BLOOD PRESSURE: 108 MMHG | DIASTOLIC BLOOD PRESSURE: 51 MMHG | HEART RATE: 94 BPM | RESPIRATION RATE: 18 BRPM

## 2019-06-27 VITALS — SYSTOLIC BLOOD PRESSURE: 138 MMHG | DIASTOLIC BLOOD PRESSURE: 65 MMHG | RESPIRATION RATE: 17 BRPM | HEART RATE: 83 BPM

## 2019-06-27 VITALS — HEART RATE: 79 BPM | RESPIRATION RATE: 18 BRPM | DIASTOLIC BLOOD PRESSURE: 56 MMHG | SYSTOLIC BLOOD PRESSURE: 115 MMHG

## 2019-06-27 LAB
ABNORMAL IP MESSAGE: 1
ADD MAN DIFF?: NO
ANION GAP: 7 (ref 5–13)
BASOPHIL #: 0 10^3/UL (ref 0–0.1)
BASOPHILS %: 1 % (ref 0–2)
BLOOD UREA NITROGEN: 16 MG/DL (ref 7–20)
CALCIUM: 9.5 MG/DL (ref 8.4–10.2)
CARBON DIOXIDE: 24 MMOL/L (ref 21–31)
CHLORIDE: 114 MMOL/L (ref 97–110)
CREATININE: 0.77 MG/DL (ref 0.44–1)
EOSINOPHILS #: 0.1 10^3/UL (ref 0–0.5)
EOSINOPHILS %: 3.2 % (ref 0–7)
GLUCOSE: 198 MG/DL (ref 70–220)
HEMATOCRIT: 22.5 % (ref 37–47)
HEMOGLOBIN: 7.2 G/DL (ref 12–16)
IMMATURE GRANS #M: 0.02 10^3/UL (ref 0–0.03)
IMMATURE GRANS % (M): 0.5 % (ref 0–0.43)
LYMPHOCYTES #: 1.2 10^3/UL (ref 0.8–2.9)
LYMPHOCYTES %: 28.7 % (ref 15–51)
MAGNESIUM: 2 MG/DL (ref 1.7–2.5)
MEAN CORPUSCULAR HEMOGLOBIN: 29.9 PG (ref 29–33)
MEAN CORPUSCULAR HGB CONC: 32 G/DL (ref 32–37)
MEAN CORPUSCULAR VOLUME: 93.4 FL (ref 82–101)
MEAN PLATELET VOLUME: 11.3 FL (ref 7.4–10.4)
MONOCYTE #: 0.5 10^3/UL (ref 0.3–0.9)
MONOCYTES %: 11.3 % (ref 0–11)
NEUTROPHIL #: 2.3 10^3/UL (ref 1.6–7.5)
NEUTROPHILS %: 55.3 % (ref 39–77)
NUCLEATED RED BLOOD CELLS #: 0 10^3/UL (ref 0–0)
NUCLEATED RED BLOOD CELLS%: 0 /100WBC (ref 0–0)
PHOSPHORUS: 4.4 MG/DL (ref 2.5–4.9)
PLATELET COUNT: 49 10^3/UL (ref 140–415)
POSITIVE DIFF: (no result)
POTASSIUM: 3.7 MMOL/L (ref 3.5–5.1)
RED BLOOD COUNT: 2.41 10^6/UL (ref 4.2–5.4)
RED CELL DISTRIBUTION WIDTH: 15.4 % (ref 11.5–14.5)
SODIUM: 145 MMOL/L (ref 135–144)
WHITE BLOOD COUNT: 4.1 10^3/UL (ref 4.8–10.8)

## 2019-06-27 RX ADMIN — INSULIN ASPART 1 UNIT: 100 INJECTION, SOLUTION INTRAVENOUS; SUBCUTANEOUS at 17:39

## 2019-06-27 RX ADMIN — LACTULOSE 1 GM: 20 SOLUTION ORAL at 02:50

## 2019-06-27 RX ADMIN — LACTULOSE 1 GM: 20 SOLUTION ORAL at 02:36

## 2019-06-27 RX ADMIN — LACTULOSE SCH GM: 10 SOLUTION ORAL at 05:39

## 2019-06-27 RX ADMIN — RIFAXIMIN SCH MG: 550 TABLET ORAL at 09:19

## 2019-06-27 RX ADMIN — CEFTRIAXONE SCH MLS/HR: 1 INJECTION, SOLUTION INTRAVENOUS at 05:39

## 2019-06-27 RX ADMIN — RIFAXIMIN 1 MG: 550 TABLET ORAL at 09:19

## 2019-06-27 RX ADMIN — LACTULOSE SCH GM: 10 SOLUTION ORAL at 11:42

## 2019-06-27 RX ADMIN — LACTULOSE 1 GM: 20 SOLUTION ORAL at 23:02

## 2019-06-27 RX ADMIN — LACTULOSE SCH GM: 10 SOLUTION ORAL at 02:36

## 2019-06-27 RX ADMIN — LACTULOSE 1 GM: 20 SOLUTION ORAL at 05:39

## 2019-06-27 RX ADMIN — LACTULOSE SCH GM: 10 SOLUTION ORAL at 02:50

## 2019-06-27 RX ADMIN — LACTULOSE SCH GM: 10 SOLUTION ORAL at 23:02

## 2019-06-27 RX ADMIN — LACTULOSE 1 GM: 20 SOLUTION ORAL at 11:42

## 2019-06-27 RX ADMIN — LACTULOSE SCH GM: 10 SOLUTION ORAL at 17:17

## 2019-06-27 RX ADMIN — INSULIN ASPART 1 UNIT: 100 INJECTION, SOLUTION INTRAVENOUS; SUBCUTANEOUS at 09:27

## 2019-06-27 RX ADMIN — INSULIN ASPART 1 UNIT: 100 INJECTION, SOLUTION INTRAVENOUS; SUBCUTANEOUS at 11:42

## 2019-06-27 RX ADMIN — INSULIN ASPART 1 UNIT: 100 INJECTION, SOLUTION INTRAVENOUS; SUBCUTANEOUS at 01:00

## 2019-06-27 RX ADMIN — CEFTRIAXONE 1 MLS/HR: 1 INJECTION, SOLUTION INTRAVENOUS at 05:39

## 2019-06-27 RX ADMIN — LACTULOSE 1 GM: 20 SOLUTION ORAL at 17:17

## 2019-06-27 RX ADMIN — INSULIN ASPART 1 UNIT: 100 INJECTION, SOLUTION INTRAVENOUS; SUBCUTANEOUS at 20:54

## 2019-06-27 NOTE — PN
Date/Time of Note


Date/Time of Note


DATE: 6/27/19 


TIME: 15:20





Assessment/Plan


VTE Prophylaxis


Risk score (from Ns)>0 risk:  4


SCD applied (from [facility] [place]  Yes


Pharmacological prophylaxis:  NA/contraindicated


Pharm contraindication:  low risk/ambulating





Lines/Catheters


IV Catheter Type (from [facility]):  Peripheral IV


Urinary Cath still in place:  No





Assessment/Plan


Assessment/Plan


69-year woman with Alzheimer's and cirrhosis with hepatic encephalopathy admit


[place] for severe lethargy. 





# acute on chronic encephalopathy: 


- multifactorial secondary to underlying UTI, hepatic encephalopathy.


- Ammonia level significantly elevated at 254.  Patient also has evidence of 


possible UTI on urinalysis.  


- NG tube out. Continue lactulose. 





# DM 


- hemoglobin A1c was 5.3 on 4/5.  Given that she is n.p.o., will monitor 


hypoglycemic episodes with insulin sliding scale.





# Lactic Acidosis : 


- lactic acid 3.1.  Likely secondary to dehydration, and metformin.  Will hold 


metformin.  Avoid excessive crystalloids due to cirrhosis. 





# Nonalcoholic cirrhosis: 


- Elevated ammonia level 254.  Will continue rifaximin and lactulose at the 


current time.  Proceed with initiating other home medications once indicated





# Moderate dementia: Monitor closely, supportive care





# h/o Glaucoma: We will need to confirm patient's home medications





# Hypertension: PRN hydralazine, resume oral medications when clinically 


indicated





# DVT GI prophylaxis: SCDs, Protonix IV





Dispo: Anticipate discharge tomorrow.


Result Diagram:  


6/27/19 0501                                                                    


           6/27/19 0501








Subjective


24 Hr Interval Summary


Free Text/Dictation


No acute overnight events. 


Patient sitting up in bed eating lunch, awake and alert, answering questions 


appropriately.


Spoke to patient's daughter Trudi. She agrees patient's mental status is back to 


baseline.





Exam/Review of Systems


Exam


Vitals





Vital Signs


  Date      Temp  Pulse  Resp  B/P (MAP)   Pulse Ox  O2          O2 Flow    FiO2


Time                                                 Delivery    Rate


   6/27/19  98.6     88    19      138/61        98  Room Air


     15:00                           (86)








Intake and Output





6/26/19 6/26/19 6/27/19





1515:00


23:00


07:00





IntakeIntake Total


50 ml


450 ml





OutputOutput Total


300 ml





BalanceBalance


50 ml


-300 ml


450 ml











Exam


General: Currently lying in bed, awake and alert. 


HEENT: Atraumatic, normocephalic. The pupils are equal, round and reactive. 


Extraocular motor are intact, dry mucous membranes


Neck: Supple with full range of motion. No rigidity or meningismus


Chest: Nontender


Lungs: Clear to auscultation bilaterally no crackles rales or wheezing


Heart: Normal S1-S2, Regular rhythm and rate. No murmur, S3, or S4


Abdomen: Soft , nontender,  slightly distended , bowel sounds are present. No 


guarding no rebound tenderness , 


Extremities: Normal to inspection, no edema no cyanosis





Results


Results 24hrs





Laboratory Tests


Test
                 6/26/19
17:11  6/26/19
20:48  6/27/19
02:48  6/27/19
05:01


Bedside Glucose              293  H         259  H          177


White Blood Count                                                         4.1  L


Red Blood Count                                                          2.41  L


Hemoglobin                                                                7.2  L


Hematocrit                                                               22.5  L


Mean Corpuscular                                                          93.4


Volume


Mean Corpuscular                                                          29.9


Hemoglobin


Mean Corpuscular      
              
              
                    32.0  



Hemoglobin
Concent


Red Cell                                                                 15.4  H


Distribution Width


Platelet Count                                                             49  L


Mean Platelet Volume                                                     11.3  H


Immature                                                                0.500  H


Granulocytes %


Neutrophils %                                                             55.3


Lymphocytes %                                                             28.7


Monocytes %                                                              11.3  H


Eosinophils %                                                              3.2


Basophils %                                                                1.0


Nucleated Red Blood                                                        0.0


Cells %


Immature                                                                 0.020


Granulocytes #


Neutrophils #                                                              2.3


Lymphocytes #                                                              1.2


Monocytes #                                                                0.5


Eosinophils #                                                              0.1


Basophils #                                                                0.0


Nucleated Red Blood                                                        0.0


Cells #


Sodium Level                                                              145  H


Potassium Level                                                            3.7


Chloride Level                                                            114  H


Carbon Dioxide Level                                                        24


Anion Gap                                                                    7


Blood Urea Nitrogen                                                         16


Creatinine                                                                0.77


Est Glomerular        
              
              
              > 60  



Filtrat Rate
mL/min


Glucose Level                                                              198


Calcium Level                                                              9.5


Phosphorus Level                                                           4.4


Magnesium Level                                                            2.0


Test
                 6/27/19
07:34  6/27/19
11:38  
              



Bedside Glucose               219           263  H








Medications


Medication





Current Medications


IV Flush (NS 3 ml) 3 ml PER PROTOCOL IV ;  Start 6/25/19 at 06:00


Ondansetron HCl (Zofran Inj) 4 mg Q6H  PRN IV NAUSEA/VOMITING;  Start 6/25/19 at


06:00


Acetaminophen (Tylenol Supp) 650 mg Q6H  PRN VT .PAIN 1-3 OR TEMP;  Start 


6/25/19 at 06:00


Docusate Sodium (Colace) 100 mg Q12H  PRN PO .CONSTIPATION;  Start 6/25/19 at 


06:00


Bisacodyl (Dulcolax) 5 mg DAILY  PRN PO .CONSTIPATION;  Start 6/25/19 at 06:00


Lactulose (Enulose) 20 gm Q6 PO  Last administered on 6/26/19at 17:26; Admin 


Dose 20 GM;  Start 6/25/19 at 06:00


Ceftriaxone Sodium 50 ml @  100 mls/hr Q24H IVPB  Last administered on 6/27/19at


05:39; Admin Dose 100 MLS/HR;  Start 6/25/19 at 07:00


Rifaximin (Xifaxan) 550 mg DAILY PO  Last administered on 6/27/19at 09:19; Admin


Dose 550 MG;  Start 6/25/19 at 09:00


Hydralazine HCl (Apresoline) 10 mg Q4H  PRN IV ELEVATED BLOOD PRESSURE;  Start 


6/25/19 at 07:00


Miscellaneous Information 1 ea NOTE XX ;  Start 6/25/19 at 07:30


Glucose (Glutose) 15 gm Q15M  PRN PO DECREASED GLUCOSE;  Start 6/25/19 at 07:30


Glucose (Glutose) 22.5 gm Q15M  PRN PO DECREASED GLUCOSE;  Start 6/25/19 at 


07:30


Dextrose (D50w Syringe) 25 ml Q15M  PRN IV DECREASED GLUCOSE;  Start 6/25/19 at 


07:30


Dextrose (D50w Syringe) 50 ml Q15M  PRN IV DECREASED GLUCOSE;  Start 6/25/19 at 


07:30


Glucagon (Glucagen) 1 mg Q15M  PRN IM DECREASED GLUCOSE;  Start 6/25/19 at 07:30


Glucose (Glutose) 15 gm Q15M  PRN BUCCAL DECREASED GLUCOSE;  Start 6/25/19 at 


07:30


Diagnostic Test (Pha) (Accu-Chek) 1 ea AC MEALS AND  BEDTIME XX  Last 


administered on 6/27/19at 11:42; Admin Dose 1 EA;  Start 6/27/19 at 07:00


Insulin Aspart (Novolog Insulin Pen) (Adult SC Insulin - Mild Algorithm)... AC 


MEALS AND  BEDTIME SC  Last administered on 6/27/19at 11:42; Admin Dose 4 UNIT; 


Start 6/27/19 at 07:00


Diagnostic Test (Pha) (Accu-Chek) 1 ea 02 XX ;  Start 6/28/19 at 02:00


Metformin HCl (Glucophage) 500 mg BID WITH  MEALS PO  Last administered on 


6/27/19at 11:55; Admin Dose 500 MG;  Start 6/27/19 at 12:00











PHYLLIS ELDRIDGE MD              Jun 27, 2019 15:21

## 2019-06-28 VITALS — HEART RATE: 92 BPM | DIASTOLIC BLOOD PRESSURE: 56 MMHG | SYSTOLIC BLOOD PRESSURE: 129 MMHG | RESPIRATION RATE: 18 BRPM

## 2019-06-28 VITALS — RESPIRATION RATE: 18 BRPM | SYSTOLIC BLOOD PRESSURE: 114 MMHG | HEART RATE: 92 BPM | DIASTOLIC BLOOD PRESSURE: 53 MMHG

## 2019-06-28 VITALS — HEART RATE: 96 BPM | DIASTOLIC BLOOD PRESSURE: 88 MMHG | SYSTOLIC BLOOD PRESSURE: 118 MMHG | RESPIRATION RATE: 20 BRPM

## 2019-06-28 VITALS — HEART RATE: 94 BPM | SYSTOLIC BLOOD PRESSURE: 121 MMHG | RESPIRATION RATE: 18 BRPM | DIASTOLIC BLOOD PRESSURE: 62 MMHG

## 2019-06-28 VITALS — SYSTOLIC BLOOD PRESSURE: 120 MMHG | RESPIRATION RATE: 22 BRPM | HEART RATE: 91 BPM | DIASTOLIC BLOOD PRESSURE: 98 MMHG

## 2019-06-28 LAB
ABNORMAL IP MESSAGE: 1
ADD MAN DIFF?: NO
ANION GAP: 10 (ref 5–13)
BASOPHIL #: 0 10^3/UL (ref 0–0.1)
BASOPHILS %: 0.5 % (ref 0–2)
BLOOD UREA NITROGEN: 18 MG/DL (ref 7–20)
CALCIUM: 9.6 MG/DL (ref 8.4–10.2)
CARBON DIOXIDE: 20 MMOL/L (ref 21–31)
CHLORIDE: 107 MMOL/L (ref 97–110)
CREATININE: 0.86 MG/DL (ref 0.44–1)
EOSINOPHILS #: 0.2 10^3/UL (ref 0–0.5)
EOSINOPHILS %: 1.8 % (ref 0–7)
GLUCOSE: 212 MG/DL (ref 70–220)
HEMATOCRIT: 23.6 % (ref 37–47)
HEMOGLOBIN: 8.2 G/DL (ref 12–16)
IMMATURE GRANS #M: 0.03 10^3/UL (ref 0–0.03)
IMMATURE GRANS % (M): 0.3 % (ref 0–0.43)
LYMPHOCYTES #: 1.9 10^3/UL (ref 0.8–2.9)
LYMPHOCYTES %: 22.2 % (ref 15–51)
MAGNESIUM: 1.8 MG/DL (ref 1.7–2.5)
MEAN CORPUSCULAR HEMOGLOBIN: 30 PG (ref 29–33)
MEAN CORPUSCULAR HGB CONC: 34.7 G/DL (ref 32–37)
MEAN CORPUSCULAR VOLUME: 86.4 FL (ref 82–101)
MEAN PLATELET VOLUME: 10.8 FL (ref 7.4–10.4)
MONOCYTE #: 0.7 10^3/UL (ref 0.3–0.9)
MONOCYTES %: 7.7 % (ref 0–11)
NEUTROPHIL #: 5.9 10^3/UL (ref 1.6–7.5)
NEUTROPHILS %: 67.5 % (ref 39–77)
NUCLEATED RED BLOOD CELLS #: 0 10^3/UL (ref 0–0)
NUCLEATED RED BLOOD CELLS%: 0 /100WBC (ref 0–0)
PHOSPHORUS: 4 MG/DL (ref 2.5–4.9)
PLATELET COUNT: 65 10^3/UL (ref 140–415)
POSITIVE DIFF: (no result)
POTASSIUM: 4.2 MMOL/L (ref 3.5–5.1)
RED BLOOD COUNT: 2.73 10^6/UL (ref 4.2–5.4)
RED CELL DISTRIBUTION WIDTH: 15.2 % (ref 11.5–14.5)
SODIUM: 137 MMOL/L (ref 135–144)
WHITE BLOOD COUNT: 8.7 10^3/UL (ref 4.8–10.8)

## 2019-06-28 RX ADMIN — RIFAXIMIN 1 MG: 550 TABLET ORAL at 08:18

## 2019-06-28 RX ADMIN — INSULIN ASPART 1 UNIT: 100 INJECTION, SOLUTION INTRAVENOUS; SUBCUTANEOUS at 08:23

## 2019-06-28 RX ADMIN — LACTULOSE 1 GM: 20 SOLUTION ORAL at 05:07

## 2019-06-28 RX ADMIN — CEFTRIAXONE SCH MLS/HR: 1 INJECTION, SOLUTION INTRAVENOUS at 06:00

## 2019-06-28 RX ADMIN — LACTULOSE 1 GM: 20 SOLUTION ORAL at 11:42

## 2019-06-28 RX ADMIN — LACTULOSE SCH GM: 10 SOLUTION ORAL at 05:07

## 2019-06-28 RX ADMIN — RIFAXIMIN SCH MG: 550 TABLET ORAL at 08:18

## 2019-06-28 RX ADMIN — LACTULOSE SCH GM: 10 SOLUTION ORAL at 11:42

## 2019-06-28 RX ADMIN — INSULIN ASPART 1 UNIT: 100 INJECTION, SOLUTION INTRAVENOUS; SUBCUTANEOUS at 11:53

## 2019-06-28 RX ADMIN — CEFTRIAXONE 1 MLS/HR: 1 INJECTION, SOLUTION INTRAVENOUS at 06:00

## 2019-06-28 NOTE — PDOCDIS
Discharge Instructions


DIAGNOSIS


Discharge Diagnosis


Hepatic encephalopathy





CONDITION


                 [id number]
Patient Condition:  [id number]
Fair








HOME CARE INSTRUCTIONS:


            [id number]
Diet Instructions:  [id number]
Reduced Sodium








ACTIVITY:


          [id number]
Activity Restrictions:  [id number]
No Restrictions








FOLLOW UP/APPOINTMENTS


Follow-up Plan


1. Continue to take all medications as prescribed.


2. Lactulose may cause loose bowel movements. To prevent hepatic encephalopathy 


you should try to have 3-4 bowel movements per day. If you have much more than 


this you can temporarily reduce the frequency of your lactulose.


3. See your primary care doctor in 1-2 weeks.











PHYLLIS ELDRIDGE MD              Jun 28, 2019 11:55

## 2019-06-28 NOTE — DS
Date/Time of Note


Date/Time of Note


DATE: 6/28/19 


TIME: 17:42





Discharge Summary


Admission/Discharge Info


Admit Date/Time


Jun 25, 2019 at 05:31


Discharge Date/Time


Jun 28, 2019 at 16:20


Discharge Diagnosis


Hepatic encephalopathy


Patient Condition:  Fair


Hx of Present Illness


Chief complaint: Altered, nonverbal





The following history was obtained from the nursing documentation as well as 


from the previous chart as patient was unable to give a history given patient's 


altered mental status.





This is a 69-year-old female with a past medical history of moderate dementia, 


nonalcoholic cirrhosis, thrombocytopenia, chronic shortness of breath on 


exertion, diabetes mellitus, glaucoma who presented to the emergency department 


from home with symptoms of increasing altered status x1 today.  Patient 


apparently has per the family had worsening symptoms over the last hour prior to


being sent to the ER.  Patient was observed in the emergency department to be 


nonverbal with only gurgling noises and moaning.  The patient was withdrawing to


pain.  Patient does have a history of Alzheimer's and does normatively ambulate 


with a walker.  His blood sugar per EMS was 80.  Patient's ammonia level in the 


emergency department was noted to be 254.  Upon my examination of the patient at


the bedside patient does withdraw to pain and she does track with her eyes, 


however she is otherwise as described above.





Allergies: Aspirin, morphine





Medications: See MAR


Hospital Course





NG tube was placed and lactulose was started. Soon after the patient began 


having bowel movements and recovered her mental status. She was able to ambulate


with PT, tolerate diet. Her daughter confirmed the patient is mentally at 


baseline. She will be discharged home to continue current meds.


Home Meds


Active Scripts


Salmeterol Xinaf/Fluticasone* (Advair*) 250-50 Diskus Inhaler, 1 INH INHALATION 


BID, #1 INHALER


   Prov:DAVID DEL TORO         4/14/19


Lactulose* (Lactulose*) 20 Gm/30 Ml Solution, 20 GM PO Q6 for 30 Days


   Prov:DAVID DEL TORO         4/14/19


Amlodipine Besylate* (Amlodipine Besylate*) 5 Mg Tablet, 5 MG PO DAILY for 30 


Days, #30 TAB


   Prov:DAVID DEL TORO         4/14/19


Reported Medications


Nepafenac (ILEVRO) 1.7 Ml Drops.susp, 1.7 ML OP


   4/5/19


Besifloxacin Hydrochloride (Besivance) 5 Ml Drops.susp, 5 ML OP


   4/5/19


Difluprednate (Durezol) 5 Ml Drops, 5 ML OP, BOTTLE


   4/5/19


Insulin Aspart* (Novolog Insulin Pen*) 100 Unit/Ml Soln, 12 UNIT SC, EA


   THE DAUGHTER UNABLE TO VERIFIED FREQUENCY


   4/5/19


Furosemide* (Furosemide*) 20 Mg Tablet, 20 MG PO DAILY, #60 TAB


   4/5/19


Spironolactone* (Aldactone*) 50 Mg Tablet, 50 MG PO DAILY, #30 TAB


   4/5/19


Metformin Hcl* (Metformin Hcl*) 1,000 Mg Tablet, 1000 MG PO WITH BREAKFAST, #30 


TAB


   4/5/19


Rifaximin* (Xifaxan*) 550 Mg Tablet, 550 MG PO DAILY, TAB


   4/5/19


Pantoprazole* (Pantoprazole*) 40 Mg Tablet.dr, 40 MG PO AC BREAKFAST, TAB


   4/5/19


Meclizine Hcl* (Meclizine Hcl*) 25 Mg Tablet, 25 MG PO DAILY PRN for DIZZINESS, 


TAB


   4/5/19


Ferrous Sulfate* (Ferrous Sulfate*) 325 Mg Tabec, 325 MG PO DAILY, TAB


   4/5/19


Follow-up Plan


1. Continue to take all medications as prescribed.


2. Lactulose may cause loose bowel movements. To prevent hepatic encephalopathy 


you should try to have 3-4 bowel movements per day. If you have much more than 


this you can temporarily reduce the frequency of your lactulose.


3. See your primary care doctor in 1-2 weeks.


Primary Care Provider


Not On Staff Doctor


Time spent on discharge:   > 30 minutes


Pending Labs





Laboratory Tests


Test
              6/27/19
20:06   6/28/19
01:25   6/28/19
05:27   6/28/19
11:41


Bedside                      267             227  
                          242


Glucose
          mg/dL
()  mg/dL
()                  mg/dL
()


White Blood      
                
                          8.7  



Count
                                            10^3/ul
(4.8-1


                                                            0.8)


Red Blood        
                
                         2.73  



Count
                                            10^6/ul
(4.20-


                                                           5.40)


Hemoglobin
      
                
                          8.2  



                                                  g/dl
(12.0-16.


                                                              0)


Hematocrit
      
                
                         23.6  



                                                   %
(37.0-47.0)


Mean             
                
                         86.4  



Corpuscular                                       fl
(82.0-101.0


Volume
                                                        )


Mean             
                
                         30.0  



Corpuscular                                       pg
(29.0-33.0)


Hemoglobin



Mean             
                
                         34.7  



Corpuscular                                       g/dl
(32.0-37.


Hemoglobin
Conc                                               0)


ent


Red Cell         
                
                         15.2  



Distribution                                       %
(11.5-14.5)


Width



Platelet Count
  
                
                           65  



                                                  10^3/UL
(140-4


                                                             15)


Mean Platelet    
                
                         10.8  



Volume
                                            fl
(7.4-10.4)


Immature         
                
                        0.300  



Granulocytes %
                                   %
(0.001-0.429


                                                               )


Neutrophils %
   
                
                         67.5  



                                                   %
(39.0-77.0)


Lymphocytes %
   
                
                         22.2  



                                                   %
(15.0-51.0)


Monocytes %
     
                
                          7.7  



                                                    %
(0.0-11.0)


Eosinophils %
   
                
                          1.8  



                                                     %
(0.0-7.0)


Basophils %
     
                
                          0.5  



                                                     %
(0.0-2.0)


Nucleated Red    
                
                          0.0  



Blood Cells %
                                    /100WBC
(0.0-0


                                                             .0)


Immature         
                
                        0.030  



Granulocytes #
                                   10^3/ul
(0.0-0


                                                           .031)


Neutrophils #
   
                
                          5.9  



                                                  10^3/ul
(1.6-7


                                                             .5)


Lymphocytes #
   
                
                          1.9  



                                                  10^3/ul
(0.8-2


                                                             .9)


Monocytes #
     
                
                          0.7  



                                                  10^3/ul
(0.3-0


                                                             .9)


Eosinophils #
   
                
                          0.2  



                                                  10^3/ul
(0.0-0


                                                             .5)


Basophils #
     
                
                          0.0  



                                                  10^3/ul
(0.0-0


                                                             .1)


Nucleated Red    
                
                          0.0  



Blood Cells #
                                    10^3/ul
(0.0-0


                                                             .0)


Sodium Level
    
                
                          137  



                                                  mmol/L
(135-14


                                                              4)


Potassium        
                
                          4.2  



Level
                                            mmol/L
(3.5-5.


                                                              1)


Chloride Level
  
                
                          107  



                                                  mmol/L
(


                                                               )


Carbon Dioxide   
                
                           20  



Level
                                            mmol/L
(21-31)


Anion Gap                                             10 (5-13)


Blood Urea       
                
                           18  



Nitrogen
                                           mg/dl
(7-20)


Creatinine
      
                
                         0.86  



                                                  mg/dl
(0.44-1.


                                                             00)


Est Glomerular   
                
               > 60            



Filtrat                                           mL/min
(>60)


Rate
mL/min


Glucose Level
   
                
                          212  



                                                  mg/dl
()


Calcium Level
   
                
                          9.6  



                                                  mg/dl
(8.4-10.


                                                              2)


Phosphorus       
                
                          4.0  



Level
                                            mg/dl
(2.5-4.9


                                                               )


Magnesium        
                
                          1.8  



Level
                                            mg/dl
(1.7-2.5


                                                               )














PHYLLIS ELDRIDGE MD              Jun 28, 2019 17:43

## 2019-10-22 ENCOUNTER — HOSPITAL ENCOUNTER (INPATIENT)
Dept: HOSPITAL 10 - E/R | Age: 69
LOS: 8 days | Discharge: NURSING, REHAB OR LONG-TERM CARE | DRG: 871 | End: 2019-10-30
Attending: INTERNAL MEDICINE | Admitting: INTERNAL MEDICINE
Payer: MEDICARE

## 2019-10-22 VITALS — DIASTOLIC BLOOD PRESSURE: 53 MMHG | RESPIRATION RATE: 20 BRPM | SYSTOLIC BLOOD PRESSURE: 113 MMHG | HEART RATE: 75 BPM

## 2019-10-22 VITALS
BODY MASS INDEX: 27.66 KG/M2 | HEIGHT: 63 IN | WEIGHT: 156.09 LBS | BODY MASS INDEX: 27.66 KG/M2 | HEIGHT: 63 IN | WEIGHT: 156.09 LBS

## 2019-10-22 DIAGNOSIS — G93.40: ICD-10-CM

## 2019-10-22 DIAGNOSIS — E87.1: ICD-10-CM

## 2019-10-22 DIAGNOSIS — A41.51: Primary | ICD-10-CM

## 2019-10-22 DIAGNOSIS — G92: ICD-10-CM

## 2019-10-22 DIAGNOSIS — D64.9: ICD-10-CM

## 2019-10-22 DIAGNOSIS — H40.9: ICD-10-CM

## 2019-10-22 DIAGNOSIS — K72.00: ICD-10-CM

## 2019-10-22 DIAGNOSIS — B96.89: ICD-10-CM

## 2019-10-22 DIAGNOSIS — R18.8: ICD-10-CM

## 2019-10-22 DIAGNOSIS — B96.20: ICD-10-CM

## 2019-10-22 DIAGNOSIS — Z76.82: ICD-10-CM

## 2019-10-22 DIAGNOSIS — E11.9: ICD-10-CM

## 2019-10-22 DIAGNOSIS — N39.0: ICD-10-CM

## 2019-10-22 DIAGNOSIS — J81.1: ICD-10-CM

## 2019-10-22 DIAGNOSIS — K74.69: ICD-10-CM

## 2019-10-22 DIAGNOSIS — Z16.12: ICD-10-CM

## 2019-10-22 DIAGNOSIS — N17.9: ICD-10-CM

## 2019-10-22 PROCEDURE — 96365 THER/PROPH/DIAG IV INF INIT: CPT

## 2019-10-22 PROCEDURE — 80076 HEPATIC FUNCTION PANEL: CPT

## 2019-10-22 PROCEDURE — 80307 DRUG TEST PRSMV CHEM ANLYZR: CPT

## 2019-10-22 PROCEDURE — 85049 AUTOMATED PLATELET COUNT: CPT

## 2019-10-22 PROCEDURE — 86704 HEP B CORE ANTIBODY TOTAL: CPT

## 2019-10-22 PROCEDURE — 86901 BLOOD TYPING SEROLOGIC RH(D): CPT

## 2019-10-22 PROCEDURE — 93005 ELECTROCARDIOGRAM TRACING: CPT

## 2019-10-22 PROCEDURE — 81003 URINALYSIS AUTO W/O SCOPE: CPT

## 2019-10-22 PROCEDURE — 83519 RIA NONANTIBODY: CPT

## 2019-10-22 PROCEDURE — 86140 C-REACTIVE PROTEIN: CPT

## 2019-10-22 PROCEDURE — 82378 CARCINOEMBRYONIC ANTIGEN: CPT

## 2019-10-22 PROCEDURE — 97162 PT EVAL MOD COMPLEX 30 MIN: CPT

## 2019-10-22 PROCEDURE — 84155 ASSAY OF PROTEIN SERUM: CPT

## 2019-10-22 PROCEDURE — A4310 INSERT TRAY W/O BAG/CATH: HCPCS

## 2019-10-22 PROCEDURE — 86644 CMV ANTIBODY: CPT

## 2019-10-22 PROCEDURE — 81001 URINALYSIS AUTO W/SCOPE: CPT

## 2019-10-22 PROCEDURE — 82962 GLUCOSE BLOOD TEST: CPT

## 2019-10-22 PROCEDURE — 76775 US EXAM ABDO BACK WALL LIM: CPT

## 2019-10-22 PROCEDURE — 36415 COLL VENOUS BLD VENIPUNCTURE: CPT

## 2019-10-22 PROCEDURE — 84145 PROCALCITONIN (PCT): CPT

## 2019-10-22 PROCEDURE — 74176 CT ABD & PELVIS W/O CONTRAST: CPT

## 2019-10-22 PROCEDURE — 87116 MYCOBACTERIA CULTURE: CPT

## 2019-10-22 PROCEDURE — 85670 THROMBIN TIME PLASMA: CPT

## 2019-10-22 PROCEDURE — 92610 EVALUATE SWALLOWING FUNCTION: CPT

## 2019-10-22 PROCEDURE — 70450 CT HEAD/BRAIN W/O DYE: CPT

## 2019-10-22 PROCEDURE — 36430 TRANSFUSION BLD/BLD COMPNT: CPT

## 2019-10-22 PROCEDURE — 80053 COMPREHEN METABOLIC PANEL: CPT

## 2019-10-22 PROCEDURE — P9047 ALBUMIN (HUMAN), 25%, 50ML: HCPCS

## 2019-10-22 PROCEDURE — 86850 RBC ANTIBODY SCREEN: CPT

## 2019-10-22 PROCEDURE — 84100 ASSAY OF PHOSPHORUS: CPT

## 2019-10-22 PROCEDURE — 76705 ECHO EXAM OF ABDOMEN: CPT

## 2019-10-22 PROCEDURE — 89051 BODY FLUID CELL COUNT: CPT

## 2019-10-22 PROCEDURE — P9016 RBC LEUKOCYTES REDUCED: HCPCS

## 2019-10-22 PROCEDURE — 87086 URINE CULTURE/COLONY COUNT: CPT

## 2019-10-22 PROCEDURE — 85730 THROMBOPLASTIN TIME PARTIAL: CPT

## 2019-10-22 PROCEDURE — 87340 HEPATITIS B SURFACE AG IA: CPT

## 2019-10-22 PROCEDURE — 86664 EPSTEIN-BARR NUCLEAR ANTIGEN: CPT

## 2019-10-22 PROCEDURE — 83540 ASSAY OF IRON: CPT

## 2019-10-22 PROCEDURE — 86900 BLOOD TYPING SEROLOGIC ABO: CPT

## 2019-10-22 PROCEDURE — 90686 IIV4 VACC NO PRSV 0.5 ML IM: CPT

## 2019-10-22 PROCEDURE — 86480 TB TEST CELL IMMUN MEASURE: CPT

## 2019-10-22 PROCEDURE — 82103 ALPHA-1-ANTITRYPSIN TOTAL: CPT

## 2019-10-22 PROCEDURE — 85610 PROTHROMBIN TIME: CPT

## 2019-10-22 PROCEDURE — 83880 ASSAY OF NATRIURETIC PEPTIDE: CPT

## 2019-10-22 PROCEDURE — 84484 ASSAY OF TROPONIN QUANT: CPT

## 2019-10-22 PROCEDURE — 71045 X-RAY EXAM CHEST 1 VIEW: CPT

## 2019-10-22 PROCEDURE — 86803 HEPATITIS C AB TEST: CPT

## 2019-10-22 PROCEDURE — 82728 ASSAY OF FERRITIN: CPT

## 2019-10-22 PROCEDURE — 85025 COMPLETE CBC W/AUTO DIFF WBC: CPT

## 2019-10-22 PROCEDURE — 83605 ASSAY OF LACTIC ACID: CPT

## 2019-10-22 PROCEDURE — 84479 ASSAY OF THYROID (T3 OR T4): CPT

## 2019-10-22 PROCEDURE — 83036 HEMOGLOBIN GLYCOSYLATED A1C: CPT

## 2019-10-22 PROCEDURE — 82140 ASSAY OF AMMONIA: CPT

## 2019-10-22 PROCEDURE — 82105 ALPHA-FETOPROTEIN SERUM: CPT

## 2019-10-22 PROCEDURE — 86706 HEP B SURFACE ANTIBODY: CPT

## 2019-10-22 PROCEDURE — 86708 HEPATITIS A ANTIBODY: CPT

## 2019-10-22 PROCEDURE — 97110 THERAPEUTIC EXERCISES: CPT

## 2019-10-22 PROCEDURE — 0W9G3ZZ DRAINAGE OF PERITONEAL CAVITY, PERCUTANEOUS APPROACH: ICD-10-PCS

## 2019-10-22 PROCEDURE — 80061 LIPID PANEL: CPT

## 2019-10-22 PROCEDURE — 97116 GAIT TRAINING THERAPY: CPT

## 2019-10-22 PROCEDURE — 97530 THERAPEUTIC ACTIVITIES: CPT

## 2019-10-22 PROCEDURE — 86920 COMPATIBILITY TEST SPIN: CPT

## 2019-10-22 PROCEDURE — 82043 UR ALBUMIN QUANTITATIVE: CPT

## 2019-10-22 PROCEDURE — 87102 FUNGUS ISOLATION CULTURE: CPT

## 2019-10-22 PROCEDURE — 80200 ASSAY OF TOBRAMYCIN: CPT

## 2019-10-22 PROCEDURE — 84300 ASSAY OF URINE SODIUM: CPT

## 2019-10-22 PROCEDURE — 86703 HIV-1/HIV-2 1 RESULT ANTBDY: CPT

## 2019-10-22 PROCEDURE — 86301 IMMUNOASSAY TUMOR CA 19-9: CPT

## 2019-10-22 PROCEDURE — 87070 CULTURE OTHR SPECIMN AEROBIC: CPT

## 2019-10-22 PROCEDURE — 84436 ASSAY OF TOTAL THYROXINE: CPT

## 2019-10-22 PROCEDURE — 92526 ORAL FUNCTION THERAPY: CPT

## 2019-10-22 PROCEDURE — 87400 INFLUENZA A/B EACH AG IA: CPT

## 2019-10-22 PROCEDURE — 83735 ASSAY OF MAGNESIUM: CPT

## 2019-10-22 PROCEDURE — 80048 BASIC METABOLIC PNL TOTAL CA: CPT

## 2019-10-22 PROCEDURE — 96375 TX/PRO/DX INJ NEW DRUG ADDON: CPT

## 2019-10-22 RX ADMIN — PIPERACILLIN AND TAZOBACTAM SCH MLS/HR: 2; .25 INJECTION, POWDER, FOR SOLUTION INTRAVENOUS at 18:29

## 2019-10-22 RX ADMIN — LACTULOSE SCH GM: 10 SOLUTION ORAL at 18:29

## 2019-10-23 VITALS — DIASTOLIC BLOOD PRESSURE: 56 MMHG | SYSTOLIC BLOOD PRESSURE: 125 MMHG | RESPIRATION RATE: 18 BRPM | HEART RATE: 83 BPM

## 2019-10-23 VITALS — RESPIRATION RATE: 16 BRPM | SYSTOLIC BLOOD PRESSURE: 113 MMHG | DIASTOLIC BLOOD PRESSURE: 55 MMHG | HEART RATE: 82 BPM

## 2019-10-23 VITALS — RESPIRATION RATE: 18 BRPM | DIASTOLIC BLOOD PRESSURE: 58 MMHG | SYSTOLIC BLOOD PRESSURE: 123 MMHG | HEART RATE: 77 BPM

## 2019-10-23 VITALS — RESPIRATION RATE: 20 BRPM | DIASTOLIC BLOOD PRESSURE: 79 MMHG | HEART RATE: 101 BPM | SYSTOLIC BLOOD PRESSURE: 138 MMHG

## 2019-10-23 VITALS — SYSTOLIC BLOOD PRESSURE: 122 MMHG | HEART RATE: 77 BPM | RESPIRATION RATE: 18 BRPM | DIASTOLIC BLOOD PRESSURE: 58 MMHG

## 2019-10-23 VITALS — RESPIRATION RATE: 20 BRPM | DIASTOLIC BLOOD PRESSURE: 79 MMHG | SYSTOLIC BLOOD PRESSURE: 109 MMHG | HEART RATE: 84 BPM

## 2019-10-23 RX ADMIN — ALBUMIN (HUMAN) SCH MLS/HR: 0.25 INJECTION, SOLUTION INTRAVENOUS at 15:18

## 2019-10-23 RX ADMIN — ALBUMIN (HUMAN) SCH MLS/HR: 0.25 INJECTION, SOLUTION INTRAVENOUS at 07:56

## 2019-10-23 RX ADMIN — LACTULOSE SCH GM: 10 SOLUTION ORAL at 06:39

## 2019-10-23 RX ADMIN — AMLODIPINE BESYLATE SCH MG: 5 TABLET ORAL at 08:45

## 2019-10-23 RX ADMIN — PIPERACILLIN AND TAZOBACTAM SCH MLS/HR: 2; .25 INJECTION, POWDER, FOR SOLUTION INTRAVENOUS at 00:49

## 2019-10-23 RX ADMIN — PIPERACILLIN AND TAZOBACTAM SCH MLS/HR: 2; .25 INJECTION, POWDER, FOR SOLUTION INTRAVENOUS at 06:39

## 2019-10-23 RX ADMIN — PIPERACILLIN AND TAZOBACTAM SCH MLS/HR: 2; .25 INJECTION, POWDER, FOR SOLUTION INTRAVENOUS at 11:56

## 2019-10-23 RX ADMIN — LACTULOSE SCH GM: 10 SOLUTION ORAL at 11:56

## 2019-10-23 RX ADMIN — LACTULOSE SCH GM: 10 SOLUTION ORAL at 00:49

## 2019-10-23 RX ADMIN — PANTOPRAZOLE SODIUM SCH MG: 40 TABLET, DELAYED RELEASE ORAL at 06:39

## 2019-10-23 RX ADMIN — RIFAXIMIN SCH MG: 550 TABLET ORAL at 21:07

## 2019-10-23 RX ADMIN — PIPERACILLIN AND TAZOBACTAM SCH MLS/HR: 2; .25 INJECTION, POWDER, FOR SOLUTION INTRAVENOUS at 17:14

## 2019-10-23 RX ADMIN — LACTULOSE SCH GM: 10 SOLUTION ORAL at 17:14

## 2019-10-23 RX ADMIN — FERROUS SULFATE TAB 325 MG (65 MG ELEMENTAL FE) SCH MG: 325 (65 FE) TAB at 08:45

## 2019-10-24 VITALS — HEART RATE: 86 BPM | SYSTOLIC BLOOD PRESSURE: 127 MMHG | DIASTOLIC BLOOD PRESSURE: 60 MMHG | RESPIRATION RATE: 20 BRPM

## 2019-10-24 VITALS — RESPIRATION RATE: 18 BRPM | HEART RATE: 83 BPM | SYSTOLIC BLOOD PRESSURE: 132 MMHG | DIASTOLIC BLOOD PRESSURE: 63 MMHG

## 2019-10-24 VITALS — RESPIRATION RATE: 20 BRPM | HEART RATE: 85 BPM | SYSTOLIC BLOOD PRESSURE: 129 MMHG | DIASTOLIC BLOOD PRESSURE: 60 MMHG

## 2019-10-24 VITALS — RESPIRATION RATE: 20 BRPM | HEART RATE: 98 BPM | SYSTOLIC BLOOD PRESSURE: 122 MMHG | DIASTOLIC BLOOD PRESSURE: 59 MMHG

## 2019-10-24 VITALS — SYSTOLIC BLOOD PRESSURE: 119 MMHG | DIASTOLIC BLOOD PRESSURE: 56 MMHG | RESPIRATION RATE: 18 BRPM | HEART RATE: 82 BPM

## 2019-10-24 VITALS — HEART RATE: 89 BPM | SYSTOLIC BLOOD PRESSURE: 143 MMHG | RESPIRATION RATE: 20 BRPM | DIASTOLIC BLOOD PRESSURE: 65 MMHG

## 2019-10-24 RX ADMIN — PIPERACILLIN AND TAZOBACTAM SCH MLS/HR: 2; .25 INJECTION, POWDER, FOR SOLUTION INTRAVENOUS at 06:46

## 2019-10-24 RX ADMIN — RIFAXIMIN SCH MG: 550 TABLET ORAL at 10:15

## 2019-10-24 RX ADMIN — PANTOPRAZOLE SODIUM SCH MG: 40 TABLET, DELAYED RELEASE ORAL at 06:51

## 2019-10-24 RX ADMIN — LACTULOSE SCH GM: 10 SOLUTION ORAL at 00:24

## 2019-10-24 RX ADMIN — AMLODIPINE BESYLATE SCH MG: 5 TABLET ORAL at 10:16

## 2019-10-24 RX ADMIN — LACTULOSE SCH GM: 10 SOLUTION ORAL at 17:13

## 2019-10-24 RX ADMIN — LACTULOSE SCH GM: 10 SOLUTION ORAL at 13:27

## 2019-10-24 RX ADMIN — FERROUS SULFATE TAB 325 MG (65 MG ELEMENTAL FE) SCH MG: 325 (65 FE) TAB at 10:17

## 2019-10-24 RX ADMIN — PIPERACILLIN AND TAZOBACTAM SCH MLS/HR: 2; .25 INJECTION, POWDER, FOR SOLUTION INTRAVENOUS at 00:24

## 2019-10-24 RX ADMIN — ALBUMIN (HUMAN) SCH MLS/HR: 0.25 INJECTION, SOLUTION INTRAVENOUS at 00:23

## 2019-10-24 RX ADMIN — RIFAXIMIN SCH MG: 550 TABLET ORAL at 20:59

## 2019-10-24 RX ADMIN — LACTULOSE SCH GM: 10 SOLUTION ORAL at 06:50

## 2019-10-24 RX ADMIN — CEFTRIAXONE SCH MLS/HR: 1 INJECTION, SOLUTION INTRAVENOUS at 08:21

## 2019-10-25 VITALS — HEART RATE: 69 BPM | SYSTOLIC BLOOD PRESSURE: 106 MMHG | RESPIRATION RATE: 16 BRPM | DIASTOLIC BLOOD PRESSURE: 52 MMHG

## 2019-10-25 VITALS — DIASTOLIC BLOOD PRESSURE: 58 MMHG | SYSTOLIC BLOOD PRESSURE: 140 MMHG | RESPIRATION RATE: 20 BRPM | HEART RATE: 92 BPM

## 2019-10-25 VITALS — RESPIRATION RATE: 22 BRPM | DIASTOLIC BLOOD PRESSURE: 67 MMHG | SYSTOLIC BLOOD PRESSURE: 153 MMHG | HEART RATE: 96 BPM

## 2019-10-25 VITALS — HEART RATE: 81 BPM | DIASTOLIC BLOOD PRESSURE: 51 MMHG | RESPIRATION RATE: 18 BRPM | SYSTOLIC BLOOD PRESSURE: 115 MMHG

## 2019-10-25 VITALS — HEART RATE: 92 BPM | RESPIRATION RATE: 20 BRPM | DIASTOLIC BLOOD PRESSURE: 58 MMHG | SYSTOLIC BLOOD PRESSURE: 125 MMHG

## 2019-10-25 VITALS — RESPIRATION RATE: 18 BRPM | SYSTOLIC BLOOD PRESSURE: 144 MMHG | HEART RATE: 96 BPM | DIASTOLIC BLOOD PRESSURE: 65 MMHG

## 2019-10-25 RX ADMIN — LACTULOSE SCH GM: 10 SOLUTION ORAL at 06:00

## 2019-10-25 RX ADMIN — MEROPENEM SCH MLS/HR: 1 INJECTION, POWDER, FOR SOLUTION INTRAVENOUS at 09:33

## 2019-10-25 RX ADMIN — PANTOPRAZOLE SODIUM SCH MG: 40 TABLET, DELAYED RELEASE ORAL at 06:45

## 2019-10-25 RX ADMIN — LACTULOSE SCH GM: 10 SOLUTION ORAL at 00:00

## 2019-10-25 RX ADMIN — CEFTRIAXONE SCH MLS/HR: 1 INJECTION, SOLUTION INTRAVENOUS at 06:45

## 2019-10-25 RX ADMIN — LACTULOSE SCH GM: 10 SOLUTION ORAL at 12:10

## 2019-10-25 RX ADMIN — LACTULOSE SCH GM: 10 SOLUTION ORAL at 17:11

## 2019-10-25 RX ADMIN — RIFAXIMIN SCH MG: 550 TABLET ORAL at 20:35

## 2019-10-25 RX ADMIN — RIFAXIMIN SCH MG: 550 TABLET ORAL at 09:32

## 2019-10-25 RX ADMIN — FERROUS SULFATE TAB 325 MG (65 MG ELEMENTAL FE) SCH MG: 325 (65 FE) TAB at 09:32

## 2019-10-25 RX ADMIN — AMLODIPINE BESYLATE SCH MG: 5 TABLET ORAL at 09:32

## 2019-10-25 RX ADMIN — MEROPENEM SCH MLS/HR: 1 INJECTION, POWDER, FOR SOLUTION INTRAVENOUS at 20:35

## 2019-10-26 VITALS — SYSTOLIC BLOOD PRESSURE: 134 MMHG | DIASTOLIC BLOOD PRESSURE: 63 MMHG | RESPIRATION RATE: 20 BRPM | HEART RATE: 80 BPM

## 2019-10-26 VITALS — SYSTOLIC BLOOD PRESSURE: 127 MMHG | RESPIRATION RATE: 20 BRPM | DIASTOLIC BLOOD PRESSURE: 60 MMHG | HEART RATE: 91 BPM

## 2019-10-26 VITALS — SYSTOLIC BLOOD PRESSURE: 117 MMHG | DIASTOLIC BLOOD PRESSURE: 54 MMHG | RESPIRATION RATE: 20 BRPM | HEART RATE: 81 BPM

## 2019-10-26 VITALS — RESPIRATION RATE: 22 BRPM | HEART RATE: 83 BPM | DIASTOLIC BLOOD PRESSURE: 65 MMHG | SYSTOLIC BLOOD PRESSURE: 140 MMHG

## 2019-10-26 VITALS — HEART RATE: 66 BPM | RESPIRATION RATE: 22 BRPM | DIASTOLIC BLOOD PRESSURE: 60 MMHG | SYSTOLIC BLOOD PRESSURE: 130 MMHG

## 2019-10-26 VITALS — SYSTOLIC BLOOD PRESSURE: 122 MMHG | RESPIRATION RATE: 20 BRPM | DIASTOLIC BLOOD PRESSURE: 57 MMHG | HEART RATE: 84 BPM

## 2019-10-26 VITALS — SYSTOLIC BLOOD PRESSURE: 128 MMHG | HEART RATE: 96 BPM | DIASTOLIC BLOOD PRESSURE: 56 MMHG | RESPIRATION RATE: 20 BRPM

## 2019-10-26 RX ADMIN — MEROPENEM SCH MLS/HR: 1 INJECTION, POWDER, FOR SOLUTION INTRAVENOUS at 09:16

## 2019-10-26 RX ADMIN — LACTULOSE SCH GM: 10 SOLUTION ORAL at 01:17

## 2019-10-26 RX ADMIN — LACTULOSE SCH GM: 10 SOLUTION ORAL at 13:40

## 2019-10-26 RX ADMIN — INSULIN GLARGINE SCH UNITS: 100 INJECTION, SOLUTION SUBCUTANEOUS at 20:47

## 2019-10-26 RX ADMIN — AMLODIPINE BESYLATE SCH MG: 5 TABLET ORAL at 09:19

## 2019-10-26 RX ADMIN — MEROPENEM SCH MLS/HR: 1 INJECTION, POWDER, FOR SOLUTION INTRAVENOUS at 20:47

## 2019-10-26 RX ADMIN — LACTULOSE SCH GM: 10 SOLUTION ORAL at 06:58

## 2019-10-26 RX ADMIN — LACTULOSE SCH GM: 10 SOLUTION ORAL at 18:31

## 2019-10-26 RX ADMIN — LACTULOSE SCH GM: 10 SOLUTION ORAL at 13:25

## 2019-10-26 RX ADMIN — RIFAXIMIN SCH MG: 550 TABLET ORAL at 09:19

## 2019-10-26 RX ADMIN — FERROUS SULFATE TAB 325 MG (65 MG ELEMENTAL FE) SCH MG: 325 (65 FE) TAB at 09:16

## 2019-10-26 RX ADMIN — PANTOPRAZOLE SODIUM SCH MG: 40 TABLET, DELAYED RELEASE ORAL at 06:58

## 2019-10-26 RX ADMIN — RIFAXIMIN SCH MG: 550 TABLET ORAL at 20:47

## 2019-10-26 RX ADMIN — LACTULOSE SCH GM: 10 SOLUTION ORAL at 23:56

## 2019-10-27 VITALS — RESPIRATION RATE: 18 BRPM | DIASTOLIC BLOOD PRESSURE: 53 MMHG | SYSTOLIC BLOOD PRESSURE: 115 MMHG | HEART RATE: 83 BPM

## 2019-10-27 VITALS — SYSTOLIC BLOOD PRESSURE: 107 MMHG | HEART RATE: 83 BPM | DIASTOLIC BLOOD PRESSURE: 49 MMHG | RESPIRATION RATE: 18 BRPM

## 2019-10-27 VITALS — SYSTOLIC BLOOD PRESSURE: 131 MMHG | RESPIRATION RATE: 22 BRPM | HEART RATE: 84 BPM | DIASTOLIC BLOOD PRESSURE: 61 MMHG

## 2019-10-27 VITALS — RESPIRATION RATE: 20 BRPM | HEART RATE: 86 BPM | SYSTOLIC BLOOD PRESSURE: 116 MMHG | DIASTOLIC BLOOD PRESSURE: 53 MMHG

## 2019-10-27 VITALS — SYSTOLIC BLOOD PRESSURE: 118 MMHG | RESPIRATION RATE: 18 BRPM | DIASTOLIC BLOOD PRESSURE: 53 MMHG | HEART RATE: 79 BPM

## 2019-10-27 VITALS — SYSTOLIC BLOOD PRESSURE: 105 MMHG | RESPIRATION RATE: 20 BRPM | HEART RATE: 86 BPM | DIASTOLIC BLOOD PRESSURE: 61 MMHG

## 2019-10-27 PROCEDURE — 30233N1 TRANSFUSION OF NONAUTOLOGOUS RED BLOOD CELLS INTO PERIPHERAL VEIN, PERCUTANEOUS APPROACH: ICD-10-PCS | Performed by: INTERNAL MEDICINE

## 2019-10-27 RX ADMIN — INSULIN GLARGINE SCH UNITS: 100 INJECTION, SOLUTION SUBCUTANEOUS at 20:48

## 2019-10-27 RX ADMIN — SULFAMETHOXAZOLE AND TRIMETHOPRIM SCH TAB: 800; 160 TABLET ORAL at 20:32

## 2019-10-27 RX ADMIN — RIFAXIMIN SCH MG: 550 TABLET ORAL at 08:07

## 2019-10-27 RX ADMIN — SULFAMETHOXAZOLE AND TRIMETHOPRIM SCH TAB: 800; 160 TABLET ORAL at 08:09

## 2019-10-27 RX ADMIN — LACTULOSE SCH GM: 10 SOLUTION ORAL at 17:07

## 2019-10-27 RX ADMIN — RIFAXIMIN SCH MG: 550 TABLET ORAL at 20:33

## 2019-10-27 RX ADMIN — LACTULOSE SCH GM: 10 SOLUTION ORAL at 06:00

## 2019-10-27 RX ADMIN — LACTULOSE SCH GM: 10 SOLUTION ORAL at 12:11

## 2019-10-27 RX ADMIN — FERROUS SULFATE TAB 325 MG (65 MG ELEMENTAL FE) SCH MG: 325 (65 FE) TAB at 08:07

## 2019-10-27 RX ADMIN — PANTOPRAZOLE SODIUM SCH MG: 40 TABLET, DELAYED RELEASE ORAL at 06:00

## 2019-10-27 RX ADMIN — AMLODIPINE BESYLATE SCH MG: 5 TABLET ORAL at 08:08

## 2019-10-28 VITALS — DIASTOLIC BLOOD PRESSURE: 56 MMHG | SYSTOLIC BLOOD PRESSURE: 118 MMHG | RESPIRATION RATE: 18 BRPM | HEART RATE: 78 BPM

## 2019-10-28 VITALS — SYSTOLIC BLOOD PRESSURE: 125 MMHG | HEART RATE: 78 BPM | RESPIRATION RATE: 20 BRPM | DIASTOLIC BLOOD PRESSURE: 58 MMHG

## 2019-10-28 VITALS — RESPIRATION RATE: 18 BRPM | DIASTOLIC BLOOD PRESSURE: 56 MMHG | SYSTOLIC BLOOD PRESSURE: 120 MMHG | HEART RATE: 79 BPM

## 2019-10-28 VITALS — DIASTOLIC BLOOD PRESSURE: 55 MMHG | RESPIRATION RATE: 18 BRPM | SYSTOLIC BLOOD PRESSURE: 118 MMHG | HEART RATE: 80 BPM

## 2019-10-28 VITALS — DIASTOLIC BLOOD PRESSURE: 60 MMHG | HEART RATE: 83 BPM | RESPIRATION RATE: 19 BRPM | SYSTOLIC BLOOD PRESSURE: 120 MMHG

## 2019-10-28 RX ADMIN — AMLODIPINE BESYLATE SCH MG: 5 TABLET ORAL at 08:03

## 2019-10-28 RX ADMIN — PANTOPRAZOLE SODIUM SCH MG: 40 TABLET, DELAYED RELEASE ORAL at 06:23

## 2019-10-28 RX ADMIN — LACTULOSE SCH GM: 10 SOLUTION ORAL at 12:05

## 2019-10-28 RX ADMIN — SULFAMETHOXAZOLE AND TRIMETHOPRIM SCH TAB: 800; 160 TABLET ORAL at 20:11

## 2019-10-28 RX ADMIN — RIFAXIMIN SCH MG: 550 TABLET ORAL at 20:11

## 2019-10-28 RX ADMIN — INSULIN GLARGINE SCH UNITS: 100 INJECTION, SOLUTION SUBCUTANEOUS at 20:22

## 2019-10-28 RX ADMIN — LACTULOSE SCH GM: 10 SOLUTION ORAL at 16:44

## 2019-10-28 RX ADMIN — FERROUS SULFATE TAB 325 MG (65 MG ELEMENTAL FE) SCH MG: 325 (65 FE) TAB at 08:03

## 2019-10-28 RX ADMIN — LACTULOSE SCH GM: 10 SOLUTION ORAL at 06:23

## 2019-10-28 RX ADMIN — RIFAXIMIN SCH MG: 550 TABLET ORAL at 08:04

## 2019-10-28 RX ADMIN — SULFAMETHOXAZOLE AND TRIMETHOPRIM SCH TAB: 800; 160 TABLET ORAL at 08:03

## 2019-10-28 RX ADMIN — LACTULOSE SCH GM: 10 SOLUTION ORAL at 01:01

## 2019-10-29 VITALS — HEART RATE: 80 BPM | DIASTOLIC BLOOD PRESSURE: 56 MMHG | RESPIRATION RATE: 20 BRPM | SYSTOLIC BLOOD PRESSURE: 125 MMHG

## 2019-10-29 VITALS — SYSTOLIC BLOOD PRESSURE: 119 MMHG | DIASTOLIC BLOOD PRESSURE: 56 MMHG | RESPIRATION RATE: 20 BRPM | HEART RATE: 79 BPM

## 2019-10-29 VITALS — RESPIRATION RATE: 18 BRPM | HEART RATE: 81 BPM | SYSTOLIC BLOOD PRESSURE: 123 MMHG | DIASTOLIC BLOOD PRESSURE: 59 MMHG

## 2019-10-29 VITALS — SYSTOLIC BLOOD PRESSURE: 118 MMHG | DIASTOLIC BLOOD PRESSURE: 57 MMHG | RESPIRATION RATE: 18 BRPM | HEART RATE: 81 BPM

## 2019-10-29 VITALS — SYSTOLIC BLOOD PRESSURE: 124 MMHG | RESPIRATION RATE: 18 BRPM | DIASTOLIC BLOOD PRESSURE: 58 MMHG | HEART RATE: 82 BPM

## 2019-10-29 VITALS — RESPIRATION RATE: 18 BRPM | DIASTOLIC BLOOD PRESSURE: 58 MMHG | SYSTOLIC BLOOD PRESSURE: 118 MMHG | HEART RATE: 78 BPM

## 2019-10-29 PROCEDURE — 0W9G3ZZ DRAINAGE OF PERITONEAL CAVITY, PERCUTANEOUS APPROACH: ICD-10-PCS | Performed by: RADIOLOGY

## 2019-10-29 RX ADMIN — LACTULOSE SCH GM: 10 SOLUTION ORAL at 01:21

## 2019-10-29 RX ADMIN — SULFAMETHOXAZOLE AND TRIMETHOPRIM SCH TAB: 800; 160 TABLET ORAL at 21:32

## 2019-10-29 RX ADMIN — LACTULOSE SCH GM: 10 SOLUTION ORAL at 12:21

## 2019-10-29 RX ADMIN — AMLODIPINE BESYLATE SCH MG: 5 TABLET ORAL at 08:53

## 2019-10-29 RX ADMIN — RIFAXIMIN SCH MG: 550 TABLET ORAL at 21:32

## 2019-10-29 RX ADMIN — RIFAXIMIN SCH MG: 550 TABLET ORAL at 08:53

## 2019-10-29 RX ADMIN — LACTULOSE SCH GM: 10 SOLUTION ORAL at 17:18

## 2019-10-29 RX ADMIN — LACTULOSE SCH GM: 10 SOLUTION ORAL at 06:35

## 2019-10-29 RX ADMIN — INSULIN GLARGINE SCH UNITS: 100 INJECTION, SOLUTION SUBCUTANEOUS at 21:34

## 2019-10-29 RX ADMIN — FERROUS SULFATE TAB 325 MG (65 MG ELEMENTAL FE) SCH MG: 325 (65 FE) TAB at 08:52

## 2019-10-29 RX ADMIN — PANTOPRAZOLE SODIUM SCH MG: 40 TABLET, DELAYED RELEASE ORAL at 06:35

## 2019-10-29 RX ADMIN — SULFAMETHOXAZOLE AND TRIMETHOPRIM SCH TAB: 800; 160 TABLET ORAL at 08:54

## 2019-10-30 VITALS — SYSTOLIC BLOOD PRESSURE: 129 MMHG | RESPIRATION RATE: 18 BRPM | DIASTOLIC BLOOD PRESSURE: 60 MMHG | HEART RATE: 79 BPM

## 2019-10-30 VITALS — SYSTOLIC BLOOD PRESSURE: 120 MMHG | RESPIRATION RATE: 19 BRPM | DIASTOLIC BLOOD PRESSURE: 60 MMHG | HEART RATE: 77 BPM

## 2019-10-30 VITALS — HEART RATE: 78 BPM | SYSTOLIC BLOOD PRESSURE: 123 MMHG | RESPIRATION RATE: 19 BRPM | DIASTOLIC BLOOD PRESSURE: 58 MMHG

## 2019-10-30 VITALS — HEART RATE: 82 BPM | DIASTOLIC BLOOD PRESSURE: 56 MMHG | RESPIRATION RATE: 19 BRPM | SYSTOLIC BLOOD PRESSURE: 116 MMHG

## 2019-10-30 VITALS — RESPIRATION RATE: 18 BRPM | DIASTOLIC BLOOD PRESSURE: 62 MMHG | SYSTOLIC BLOOD PRESSURE: 134 MMHG | HEART RATE: 81 BPM

## 2019-10-30 RX ADMIN — LACTULOSE SCH GM: 10 SOLUTION ORAL at 12:33

## 2019-10-30 RX ADMIN — RIFAXIMIN SCH MG: 550 TABLET ORAL at 20:28

## 2019-10-30 RX ADMIN — LACTULOSE SCH GM: 10 SOLUTION ORAL at 00:50

## 2019-10-30 RX ADMIN — RIFAXIMIN SCH MG: 550 TABLET ORAL at 08:30

## 2019-10-30 RX ADMIN — SULFAMETHOXAZOLE AND TRIMETHOPRIM SCH TAB: 800; 160 TABLET ORAL at 08:31

## 2019-10-30 RX ADMIN — SULFAMETHOXAZOLE AND TRIMETHOPRIM SCH TAB: 800; 160 TABLET ORAL at 20:28

## 2019-10-30 RX ADMIN — AMLODIPINE BESYLATE SCH MG: 5 TABLET ORAL at 08:31

## 2019-10-30 RX ADMIN — FERROUS SULFATE TAB 325 MG (65 MG ELEMENTAL FE) SCH MG: 325 (65 FE) TAB at 08:31

## 2019-10-30 RX ADMIN — INSULIN GLARGINE SCH UNITS: 100 INJECTION, SOLUTION SUBCUTANEOUS at 20:39

## 2019-10-30 RX ADMIN — PANTOPRAZOLE SODIUM SCH MG: 40 TABLET, DELAYED RELEASE ORAL at 06:20

## 2019-10-30 RX ADMIN — LACTULOSE SCH GM: 10 SOLUTION ORAL at 17:22

## 2019-10-30 RX ADMIN — LACTULOSE SCH GM: 10 SOLUTION ORAL at 06:14

## 2025-06-04 NOTE — PN
Date/Time of Note


Date/Time of Note


DATE: 4/10/19 


TIME: 15:06





Objective


Vitals





Vital Signs


  Date      Temp  Pulse  Resp  B/P (MAP)   Pulse Ox  O2          O2 Flow    FiO2


Time                                                 Delivery    Rate


   4/10/19  97.9     91    17      144/64       100


     14:15                           (90)


    4/8/19                                           Room Air


     14:16








Intake and Output





4/9/19


4/9/19


4/10/19





1515:00


23:00


07:00





IntakeIntake Total


350 ml


800 ml


550 ml





OutputOutput Total


400 ml





BalanceBalance


-50 ml


800 ml


550 ml














Results


Result Diagram:  


4/10/19 0455                                                                    


           4/10/19 0455








Medications


Medications





Current Medications


Ferrous Sulfate (Ferrous Sulfate (Ec)) 325 mg DAILY PO  Last administered on 


4/10/19at 08:22; Admin Dose 325 MG;  Start 4/6/19 at 09:00


Furosemide (Lasix) 20 mg DAILY PO  Last administered on 4/10/19at 08:23; Admin 


Dose 20 MG;  Start 4/6/19 at 09:00


Insulin Aspart (Novolog Insulin Pen) 12 unit WITH  MEALS SC  Last administered 


on 4/10/19at 12:50; Admin Dose 12 UNIT;  Start 4/6/19 at 07:35


Insulin Glargine (Lantus) 60 units DAILY@0800 SC  Last administered on 4/10/19at


08:19; Admin Dose 60 UNITS;  Start 4/6/19 at 08:00


Meclizine HCl (Antivert) 25 mg DAILY  PRN PO DIZZINESS;  Start 4/5/19 at 19:00


Pantoprazole (Protonix Tab) 40 mg AC  BREAKFAST PO  Last administered on 


4/10/19at 05:43; Admin Dose 40 MG;  Start 4/6/19 at 07:30


Rifaximin (Xifaxan) 550 mg DAILY PO  Last administered on 4/10/19at 09:21; Admin


Dose 550 MG;  Start 4/6/19 at 09:00


Spironolactone (Aldactone) 50 mg DAILY PO  Last administered on 4/10/19at 08:22;


Admin Dose 50 MG;  Start 4/6/19 at 09:00


Acetazolamide (Diamox Sequels) 500 mg BID PO  Last administered on 4/6/19at 


10:47; Admin Dose 500 MG;  Start 4/5/19 at 21:00;  Status Hold


Patient Own Medication 1 ea QID RIGHT EYE  Last administered on 4/10/19at 12:46;


Admin Dose 1 EA;  Start 4/6/19 at 17:00


Patient Own Medication 1 ea DAILY RIGHT EYE  Last administered on 4/10/19at 


08:22; Admin Dose 1 EA;  Start 4/7/19 at 09:00


Patient Own Medication 1 ea Q4 RIGHT EYE  Last administered on 4/10/19at 12:46; 


Admin Dose 1 EA;  Start 4/6/19 at 17:00


Lactulose (Enulose) 20 gm Q6 PO  Last administered on 4/10/19at 12:44; Admin 


Dose 20 GM;  Start 4/6/19 at 18:00


Miscellaneous Information 1 ea NOTE XX ;  Start 4/7/19 at 09:30


Glucose (Glutose) 15 gm Q15M  PRN PO DECREASED GLUCOSE;  Start 4/7/19 at 09:30


Glucose (Glutose) 22.5 gm Q15M  PRN PO DECREASED GLUCOSE;  Start 4/7/19 at 09:30


Dextrose (D50w Syringe) 25 ml Q15M  PRN IV DECREASED GLUCOSE;  Start 4/7/19 at 


09:30


Dextrose (D50w Syringe) 50 ml Q15M  PRN IV DECREASED GLUCOSE;  Start 4/7/19 at 


09:30


Glucagon (Glucagen) 1 mg Q15M  PRN IM DECREASED GLUCOSE;  Start 4/7/19 at 09:30


Glucose (Glutose) 15 gm Q15M  PRN BUCCAL DECREASED GLUCOSE;  Start 4/7/19 at 


09:30


Ceftriaxone Sodium 50 ml @  100 mls/hr Q24H IVPB  Last administered on 4/9/19at 


21:05; Admin Dose 100 MLS/HR;  Start 4/7/19 at 21:00


Potassium Chloride/Dextrose/ Sod Cl 1,000 ml @  50 mls/hr Q20H IV  Last 


administered on 4/10/19at 05:42; Admin Dose 50 MLS/HR;  Start 4/9/19 at 08:00


Insulin Aspart (Novolog Insulin Pen) NOVOLOG *MILD* ALGORITHM WITH MEALS  BED


TIME SC  Last administered on 4/10/19at 12:51; Admin Dose 3 UNIT;  Start 4/9/19 


at 18:05


Potassium Chloride (Klor-Con 20) 40 meq Q4H PO  Last administered on 4/10/19at 


12:44; Admin Dose 40 MEQ;  Start 4/10/19 at 12:00;  Stop 4/10/19 at 16:01


Amlodipine Besylate (Norvasc) 5 mg DAILY PO  Last administered on 4/10/19at 


12:44; Admin Dose 5 MG;  Start 4/10/19 at 12:00





VTE Prophylaxis


Risk score (from Ns)>0 risk:  3


SCD applied (from Ns):  Yes





Lines/Catheters


IV Catheter Type:  


Hawk in Place:  No





Assessment/Plan


Hospital Course


Subjective


patient continues to improve, following all command and able to have 


conversations.





Objective





Physical exam





General: Patient is laying in bed and answers questions appropriately


Mentation: Patient is alert and oriented to self and place and mildly to 


situation


Head: Normocephalic atraumatic


Eyes: EOMI, pupils reactive to light


Neck: Supple, nontender, midline


Respiratory: Clear to auscultation bilaterally


Cardiovascular: regular rate, no obvious murmurs


Gastrointestinal: non-tender to palpation, bowel sounds heard.


Neurological: Moves all extremities spontaneously


Skin: No new skin lesions





Assessment/Plan


1.  Acute hepatic encephalopathy


- Patient mentation has significantly improved


- CT head performed with no acute abnormalities


-Possibly in combination of elevated ammonia along with UTI.





2.  Urinary retention/UTI


- possible UTI seen on UA, low leuk but high wbc.  urine cultures negative, 


patient received 3 doses of ceftriaxone, will monitor off abx.


- may have contributed to AMS


- hawk in place, will dc


-IV abx will stop





3.  DM


- A1c noted.  No need for ISS at this time


- holding Metformin and Insulin since patient has poor PO intake.  May not need 


to be on such high doses of Lantus given A1c is 5.3, however will monitor





4.  Hypercalcemia


- possibly due to dehydration. IVF given





5.   Hyperammonemia- improving


- 102 earlier this week


- continue on lactulose and rifaximin. Increasing lactulose until passing BM and


then will titrate for 3-4 BM a day





6. h/o Glaucoma


- continue home eye drops


- hold off on Diamox for now given patients confusion started after medication.





7.  Disposition


- Will adjust Lactulose dose for 3-4 BMs daily and monitor for improvement in 


mentation


-BP elevated, if safe, plan for ARU tomorrow


-We will likely need some form of skilled nursing facility versus home health 


PT, continued monitoring for mentation improvement











DAVID DEL TORO                    Apr 10, 2019 15:07 Family reported that patient is in MRI